# Patient Record
Sex: FEMALE | Race: WHITE | Employment: OTHER | URBAN - METROPOLITAN AREA
[De-identification: names, ages, dates, MRNs, and addresses within clinical notes are randomized per-mention and may not be internally consistent; named-entity substitution may affect disease eponyms.]

---

## 2017-07-17 ENCOUNTER — OFFICE VISIT (OUTPATIENT)
Dept: INTERNAL MEDICINE CLINIC | Age: 82
End: 2017-07-17

## 2017-07-17 VITALS
BODY MASS INDEX: 29.45 KG/M2 | SYSTOLIC BLOOD PRESSURE: 128 MMHG | DIASTOLIC BLOOD PRESSURE: 60 MMHG | HEART RATE: 68 BPM | HEIGHT: 61 IN | WEIGHT: 156 LBS | TEMPERATURE: 97.8 F | OXYGEN SATURATION: 97 %

## 2017-07-17 DIAGNOSIS — J01.90 ACUTE NON-RECURRENT SINUSITIS, UNSPECIFIED LOCATION: Primary | ICD-10-CM

## 2017-07-17 RX ORDER — VALSARTAN AND HYDROCHLOROTHIAZIDE 160; 12.5 MG/1; MG/1
TABLET, FILM COATED ORAL
Refills: 2 | COMMUNITY
Start: 2017-04-26 | End: 2017-12-18 | Stop reason: SDUPTHER

## 2017-07-17 RX ORDER — PRAVASTATIN SODIUM 40 MG/1
TABLET ORAL
Refills: 11 | COMMUNITY
Start: 2017-06-22 | End: 2020-04-15 | Stop reason: SDUPTHER

## 2017-07-17 RX ORDER — CLOPIDOGREL BISULFATE 75 MG/1
TABLET ORAL
Refills: 11 | COMMUNITY
Start: 2017-06-22 | End: 2020-08-17 | Stop reason: ALTCHOICE

## 2017-07-17 RX ORDER — AZITHROMYCIN 250 MG/1
250 TABLET, FILM COATED ORAL SEE ADMIN INSTRUCTIONS
Qty: 6 TAB | Refills: 0 | Status: SHIPPED | OUTPATIENT
Start: 2017-07-17 | End: 2017-07-22

## 2017-07-17 NOTE — MR AVS SNAPSHOT
Visit Information Date & Time Provider Department Dept. Phone Encounter #  
 7/17/2017  3:00 PM Marine Ahumada, NP [address] [place] [address] [facility] [phone] [id number] Your Appointments 1/15/2018  9:30 AM  
[facility] ([facility]) Appt Note: [address] [phone] [address] [address] Upcoming Health Maintenance Date Due DTaP/Tdap/Td series (1 - Tdap) 5/17/1953 ZOSTER VACCINE AGE 60> 5/17/1992 GLAUCOMA SCREENING Q2Y 5/17/1997 OSTEOPOROSIS SCREENING (DEXA) 5/17/1997 MEDICARE YEARLY EXAM 5/17/1997 Pneumococcal 65+ Low/Medium Risk (2 of 2 - PPSV23) 1/1/2015 INFLUENZA AGE 9 TO ADULT 8/1/2017 Allergies as of 7/17/2017  Review Complete On: 7/17/2017 By: Marine Ahumada, NP No Known Allergies Current Immunizations  Reviewed on 9/26/2012 Name Date Influenza Vaccine Split 10/14/2010 11:55 AM  
 Influenza Vaccine Whole 9/10/2012 Pneumococcal Vaccine (Unspecified Type) 1/1/2010 Not reviewed this visit You Were Diagnosed With   
  
 Codes Comments Acute non-recurrent sinusitis, unspecified location    -  Primary ICD-10-CM: J01.90 ICD-9-CM: 461.9 Vitals BP Pulse Temp Height(growth percentile) Weight(growth percentile) SpO2  
 128/60 (BP 1 Location: Left arm, BP Patient Position: Sitting) 68 97.8 °F (36.6 °C) (Oral) 5' 1\" (1.549 m) 156 lb (70.8 kg) 97% BMI OB Status Smoking Status 29.48 kg/m2 Postmenopausal Never Smoker Vitals History BMI and BSA Data Body Mass Index Body Surface Area  
 29.48 kg/m 2 1.75 m 2 Preferred Pharmacy Pharmacy Name Phone [phone] [facility] [phone] Your Updated Medication List  
  
 This list is accurate as of: 7/17/17  4:40 PM.  Always use your most recent med list.  
  
  
  
  
 aspirin 81 mg chewable tablet Take 81 mg by mouth daily. Indications: THROMBOSIS PREVENTION AFTER PCI  
  
 azithromycin 250 mg tablet Commonly known as:  Bibi Croak Take 1 Tab by mouth See Admin Instructions for 5 days. calcium carbonate 600 mg calcium (1,500 mg) tablet Commonly known as:  [place] Take 300 mg by mouth every Monday, Wednesday, Friday. CENTRUM SILVER Tab tablet Generic drug:  multivitamins-minerals-lutein Take 1 Tab by mouth daily. clopidogrel 75 mg Tab Commonly known as:  PLAVIX TAKE 1 TABLET BY MOUTH EVERY DAY  
  
 pravastatin 40 mg tablet Commonly known as:  PRAVACHOL  
TAKE 1 TABLET BY MOUTH AT BEDTIME  
  
 valsartan-hydroCHLOROthiazide 160-12.5 mg per tablet Commonly known as:  DIOVAN-HCT  
TAKE 1 TABLET BY MOUTH EVERY DAY Prescriptions Sent to Pharmacy Refills  
 azithromycin (ZITHROMAX) 250 mg tablet 0 Sig: Take 1 Tab by mouth See Admin Instructions for 5 days. Class: Normal  
 Pharmacy: [address] #: [phone] Route: Oral  
  
Patient Instructions Sinusitis: Care Instructions Your Care Instructions Sinusitis is an infection of the lining of the sinus cavities in your head. Sinusitis often follows a cold. It causes pain and pressure in your head and face. In most cases, sinusitis gets better on its own in 1 to 2 weeks. But some mild symptoms may last for several weeks. Sometimes antibiotics are needed. Follow-up care is a key part of your treatment and safety. Be sure to make and go to all appointments, and call your doctor if you are having problems. It's also a good idea to know your test results and keep a list of the medicines you take. How can you care for yourself at home? · Take an over-the-counter pain medicine, such as acetaminophen (Tylenol), ibuprofen (Advil, Motrin), or naproxen (Aleve). Read and follow all instructions on the label. · If the doctor prescribed antibiotics, take them as directed. Do not stop taking them just because you feel better. You need to take the full course of antibiotics. · Be careful when taking over-the-counter cold or flu medicines and Tylenol at the same time. Many of these medicines have acetaminophen, which is Tylenol. Read the labels to make sure that you are not taking more than the recommended dose. Too much acetaminophen (Tylenol) can be harmful. · Breathe warm, moist air from a steamy shower, a hot bath, or a sink filled with hot water. Avoid cold, dry air. Using a humidifier in your home may help. Follow the directions for cleaning the machine. · Use saline (saltwater) nasal washes to help keep your nasal passages open and wash out mucus and bacteria. You can buy saline nose drops at a grocery store or drugstore. Or you can make your own at home by adding 1 teaspoon of salt and 1 teaspoon of baking soda to 2 cups of distilled water. If you make your own, fill a bulb syringe with the solution, insert the tip into your nostril, and squeeze gently. [place] your nose. · Put a hot, wet towel or a warm gel pack on your face 3 or 4 times a day for 5 to 10 minutes each time. · Try a decongestant nasal spray like oxymetazoline (Afrin). Do not use it for more than 3 days in a row. Using it for more than 3 days can make your congestion worse. When should you call for help? Call your doctor now or seek immediate medical care if: 
· You have new or worse swelling or redness in your face or around your eyes. · You have a new or higher fever. Watch closely for changes in your health, and be sure to contact your doctor if: 
· You have new or worse facial pain. · The mucus from your nose becomes thicker (like pus) or has new blood in it. · You are not getting better as expected. Where can you learn more? Go to http://richard-erum.info/. Enter T704 in the search box to learn more about \"Sinusitis: Care Instructions. \" Current as of: July 29, 2016 Content Version: 11.3 © 4680-0797 [employer]. Care instructions adapted under license by [employer] (which disclaims liability or warranty for this information). If you have questions about a medical condition or this instruction, always ask your healthcare professional. [address] any warranty or liability for your use of this information. Introducing [facility] & HEALTH SERVICES! [facility] introduces [employer] patient portal. Now you can access parts of your medical record, email your doctor's office, and request medication refills online. 1. In your internet browser, go to [email or web address] 2. Click on the First Time User? Click Here link in the Sign In box. You will see the New Member Sign Up page. 3. Enter your [employer] Access Code exactly as it appears below. You will not need to use this code after youve completed the sign-up process. If you do not sign up before the expiration date, you must request a new code. · [employer] Access Code: [email or web address] Expires: 10/15/2017  2:54 PM 
 
4. Enter the last four digits of your Social Security Number (xxxx) and Date of Birth (mm/dd/yyyy) as indicated and click Submit. You will be taken to the next sign-up page. 5. Create a Human Performance Integrated Systemst ID. This will be your [employer] login ID and cannot be changed, so think of one that is secure and easy to remember. 6. Create a [employer] password. You can change your password at any time. 7. Enter your Password Reset Question and Answer. This can be used at a later time if you forget your password. 8. Enter your e-mail address. You will receive e-mail notification when new information is available in [address] 9. Click Sign Up. You can now view and download portions of your medical record. 10. Click the Download Summary menu link to download a portable copy of your medical information. If you have questions, please visit the Frequently Asked Questions section of the [employer] website. Remember, [employer] is NOT to be used for urgent needs. For medical emergencies, dial 911. Now available from your iPhone and Android! Please provide this summary of care documentation to your next provider. Your primary care clinician is listed as MILADIS Short. If you have any questions after today's visit, please call [phone].

## 2017-07-17 NOTE — PROGRESS NOTES
Patient of Dr Elizabeth Adler here for cough that started last Friday. Cough productive of clear sputum  Patient has been unable to sleep. 1. Have you been to the ER, urgent care clinic since your last visit? Hospitalized since your last visit? No    2. Have you seen or consulted any other health care providers outside of the [address] [place] [address] since your last visit? Include any pap smears or colon screening.  No

## 2017-07-17 NOTE — PROGRESS NOTES
Subjective:  Ms. Renetta Higuera is a pleasant 80year old lady who comes in today for a four day history of some nasal congestion, yellowish nasal drainage and postnasal discharge. She denies any earache or sore throat. She denies any shortness of breath, cough, wheezing or hemoptysis. She denies any chills or fever. She denies any nausea or vomiting. She has been using some Tylenol that has helped minimally. Physical Examination:  GENERAL:  On exam she is a pleasant, elderly lady in no acute distress. She is alert and oriented. HEENT:  Normocephalic, atraumatic. TMs normal.  Mouth mucosa pink. Tongue midline. Pharynx normal.  NECK:  Supple without adenopathy. CHEST:  Lungs were clear to auscultation, no rales or wheezes. CARDIAC:  Heart regular rhythm without murmur. EXTREMITIES:  No edema or calf tenderness. Distal pulses were present. Impression:  1. Acute sinusitis. Plan:  1. It was opted to start her on a Z-Jesus.   2. I have asked her to get some Zyrtec or Claritin from over the counter and start taking it while on the antibiotic. 3. She is to increase fluid and rest.  4. She will call us should she fail to improve or if her condition worsens.

## 2017-07-17 NOTE — PATIENT INSTRUCTIONS
Sinusitis: Care Instructions  Your Care Instructions    Sinusitis is an infection of the lining of the sinus cavities in your head. Sinusitis often follows a cold. It causes pain and pressure in your head and face. In most cases, sinusitis gets better on its own in 1 to 2 weeks. But some mild symptoms may last for several weeks. Sometimes antibiotics are needed. Follow-up care is a key part of your treatment and safety. Be sure to make and go to all appointments, and call your doctor if you are having problems. It's also a good idea to know your test results and keep a list of the medicines you take. How can you care for yourself at home? · Take an over-the-counter pain medicine, such as acetaminophen (Tylenol), ibuprofen (Advil, Motrin), or naproxen (Aleve). Read and follow all instructions on the label. · If the doctor prescribed antibiotics, take them as directed. Do not stop taking them just because you feel better. You need to take the full course of antibiotics. · Be careful when taking over-the-counter cold or flu medicines and Tylenol at the same time. Many of these medicines have acetaminophen, which is Tylenol. Read the labels to make sure that you are not taking more than the recommended dose. Too much acetaminophen (Tylenol) can be harmful. · Breathe warm, moist air from a steamy shower, a hot bath, or a sink filled with hot water. Avoid cold, dry air. Using a humidifier in your home may help. Follow the directions for cleaning the machine. · Use saline (saltwater) nasal washes to help keep your nasal passages open and wash out mucus and bacteria. You can buy saline nose drops at a grocery store or drugstore. Or you can make your own at home by adding 1 teaspoon of salt and 1 teaspoon of baking soda to 2 cups of distilled water. If you make your own, fill a bulb syringe with the solution, insert the tip into your nostril, and squeeze gently. [place] Parisian your nose.   · Put a hot, wet towel or a warm gel pack on your face 3 or 4 times a day for 5 to 10 minutes each time. · Try a decongestant nasal spray like oxymetazoline (Afrin). Do not use it for more than 3 days in a row. Using it for more than 3 days can make your congestion worse. When should you call for help? Call your doctor now or seek immediate medical care if:  · You have new or worse swelling or redness in your face or around your eyes. · You have a new or higher fever. Watch closely for changes in your health, and be sure to contact your doctor if:  · You have new or worse facial pain. · The mucus from your nose becomes thicker (like pus) or has new blood in it. · You are not getting better as expected. Where can you learn more? Go to http://richard-erum.info/. Enter Y046 in the search box to learn more about \"Sinusitis: Care Instructions. \"  Current as of: July 29, 2016  Content Version: 11.3  © 6466-7481 [employer]. Care instructions adapted under license by [employer] (which disclaims liability or warranty for this information). If you have questions about a medical condition or this instruction, always ask your healthcare professional. [place] [address] any warranty or liability for your use of this information.

## 2017-07-27 ENCOUNTER — OFFICE VISIT (OUTPATIENT)
Dept: INTERNAL MEDICINE CLINIC | Age: 82
End: 2017-07-27

## 2017-07-27 VITALS
HEIGHT: 62 IN | DIASTOLIC BLOOD PRESSURE: 62 MMHG | WEIGHT: 156 LBS | SYSTOLIC BLOOD PRESSURE: 148 MMHG | BODY MASS INDEX: 28.71 KG/M2

## 2017-07-27 DIAGNOSIS — M17.11 ARTHRITIS OF RIGHT KNEE: Primary | ICD-10-CM

## 2017-07-27 DIAGNOSIS — M17.11 PRIMARY OSTEOARTHRITIS OF RIGHT KNEE: ICD-10-CM

## 2017-07-27 PROBLEM — I10 HTN (HYPERTENSION): Status: ACTIVE | Noted: 2017-07-27

## 2017-07-27 PROBLEM — M54.9 BACK PAIN: Status: ACTIVE | Noted: 2017-07-27

## 2017-07-27 PROBLEM — R53.83 FATIGUE: Status: ACTIVE | Noted: 2017-07-27

## 2017-07-27 PROBLEM — R05.9 COUGH: Status: ACTIVE | Noted: 2017-07-27

## 2017-07-27 PROBLEM — I65.29 CAROTID ATHEROSCLEROSIS: Status: ACTIVE | Noted: 2017-07-27

## 2017-07-27 PROBLEM — Z23 NEED FOR STREPTOCOCCUS PNEUMONIAE VACCINATION: Status: ACTIVE | Noted: 2017-07-27

## 2017-07-27 PROBLEM — Z79.899 ON STATIN THERAPY: Status: ACTIVE | Noted: 2017-07-27

## 2017-07-27 PROBLEM — L57.0 AK (ACTINIC KERATOSIS): Status: ACTIVE | Noted: 2017-07-27

## 2017-07-27 PROBLEM — Z87.01 HISTORY OF PNEUMONIA: Status: ACTIVE | Noted: 2017-07-27

## 2017-07-27 PROBLEM — I25.10 ARTERIOSCLEROTIC CORONARY ARTERY DISEASE: Status: ACTIVE | Noted: 2017-07-27

## 2017-07-27 PROBLEM — E78.5 HYPERLIPIDEMIA: Status: ACTIVE | Noted: 2017-07-27

## 2017-07-27 PROBLEM — R55 SYNCOPE: Status: ACTIVE | Noted: 2017-07-27

## 2017-07-27 PROBLEM — M72.2 PLANTAR FASCIITIS: Status: ACTIVE | Noted: 2017-07-27

## 2017-07-27 PROBLEM — S43.409A SHOULDER SPRAIN: Status: ACTIVE | Noted: 2017-07-27

## 2017-07-27 RX ORDER — CEFDINIR 300 MG/1
300 CAPSULE ORAL 2 TIMES DAILY
COMMUNITY
End: 2017-07-27 | Stop reason: ALTCHOICE

## 2017-07-27 NOTE — MR AVS SNAPSHOT
Visit Information Date & Time Provider Department Dept. Phone Encounter #  
 7/27/2017  9:10 AM MILADIS Barnhart MD [address] [place] [address] [facility] [phone] [id number] Follow-up Instructions Return if symptoms worsen or fail to improve. Your Appointments 1/15/2018  9:30 AM  
[facility] Appt Note: [address] [phone] [address] [facility] [address] Upcoming Health Maintenance Date Due DTaP/Tdap/Td series (1 - Tdap) 5/17/1953 ZOSTER VACCINE AGE 60> 3/17/1992 GLAUCOMA SCREENING Q2Y 5/17/1997 OSTEOPOROSIS SCREENING (DEXA) 5/17/1997 MEDICARE YEARLY EXAM 5/17/1997 Pneumococcal 65+ Low/Medium Risk (2 of 2 - PPSV23) 1/1/2015 INFLUENZA AGE 9 TO ADULT 8/1/2017 Allergies as of 7/27/2017  Review Complete On: 7/27/2017 By: Stuart Billy MD  
 No Known Allergies Current Immunizations  Reviewed on 9/26/2012 Name Date Influenza Vaccine 12/1/2010 Influenza Vaccine Split 10/14/2010 11:55 AM  
 Influenza Vaccine Whole 9/10/2012 ZZZ-RETIRED (DO NOT USE) Pneumococcal Vaccine (Unspecified Type) 1/1/2010 Not reviewed this visit You Were Diagnosed With   
  
 Codes Comments Arthritis of right knee    -  Primary ICD-10-CM: M17.11 ICD-9-CM: 716.96 Primary osteoarthritis of right knee     ICD-10-CM: M17.11 ICD-9-CM: 715.16 Vitals BP Height(growth percentile) Weight(growth percentile) BMI OB Status Smoking Status 148/62 (BP 1 Location: Left arm, BP Patient Position: Sitting) 5' 2\" (1.575 m) 156 lb (70.8 kg) 28.53 kg/m2 Postmenopausal Never Smoker BMI and BSA Data Body Mass Index Body Surface Area 28.53 kg/m 2 1.76 m 2 Preferred Pharmacy Pharmacy Name Phone [phone] [facility] [phone] Your Updated Medication List  
  
   
This list is accurate as of: 7/27/17 10:55 AM.  Always use your most recent med list.  
  
  
  
  
 CENTRUM SILVER Tab tablet Generic drug:  multivitamins-minerals-lutein Take 1 Tab by mouth daily. clopidogrel 75 mg Tab Commonly known as:  PLAVIX TAKE 1 TABLET BY MOUTH EVERY DAY  
  
 pravastatin 40 mg tablet Commonly known as:  PRAVACHOL  
TAKE 1 TABLET BY MOUTH AT BEDTIME  
  
 valsartan-hydroCHLOROthiazide 160-12.5 mg per tablet Commonly known as:  DIOVAN-HCT  
TAKE 1 TABLET BY MOUTH EVERY DAY Follow-up Instructions Return if symptoms worsen or fail to improve. Patient Instructions Knee Arthritis: Care Instructions Your Care Instructions Knee arthritis is a breakdown of the cartilage that cushions your knee joint. When the cartilage wears down, your bones rub against each other. This causes pain and stiffness. Knee arthritis tends to get worse with time. Treatment for knee arthritis involves reducing pain, making the leg muscles stronger, and staying at a healthy body weight. The treatment usually does not improve the health of the cartilage, but it can reduce pain and improve how well your knee works. You can take simple measures to protect your knee joints, ease your pain, and help you stay active. Follow-up care is a key part of your treatment and safety. Be sure to make and go to all appointments, and call your doctor if you are having problems. It's also a good idea to know your test results and keep a list of the medicines you take. How can you care for yourself at home? · Know that knee arthritis will cause more pain on some days than on others. · Stay at a healthy weight. Lose weight if you are overweight.  When you stand up, the pressure on your knees from every pound of body weight is multiplied four times. So if you lose 10 pounds, you will reduce the pressure on your knees by 40 pounds. · Talk to your doctor or physical therapist about exercises that will help ease joint pain. ¨ Stretch to help prevent stiffness and to prevent injury before you exercise. You may enjoy gentle forms of yoga to help keep your knee joints and muscles flexible. ¨ Walk instead of jog. ¨ Ride a bike. This makes your thigh muscles stronger and takes pressure off your knee. ¨ Wear well-fitting and comfortable shoes. ¨ Exercise in chest-deep water. This can help you exercise longer with less pain. ¨ Avoid exercises that include squatting or kneeling. They can put a lot of strain on your knees. ¨ Talk to your doctor to make sure that the exercise you do is not making the arthritis worse. · Do not sit for long periods of time. Try to walk once in a while to keep your knee from getting stiff. · Ask your doctor or physical therapist whether shoe inserts may reduce your arthritis pain. · If you can afford it, get new athletic shoes at least every year. This can help reduce the strain on your knees. · Use a device to help you do everyday activities. ¨ A cane or walking stick can help you keep your balance when you walk. Hold the cane or walking stick in the hand opposite the painful knee. ¨ If you feel like you may fall when you walk, try using crutches or a front-wheeled walker. These can prevent falls that could cause more damage to your knee. ¨ A knee brace may help keep your knee stable and prevent pain. ¨ You also can use other things to make life easier, such as a higher toilet seat and handrails in the bathtub or shower. · Take pain medicines exactly as directed. ¨ Do not wait until you are in severe pain. You will get better results if you take it sooner.  
¨ If you are not taking a prescription pain medicine, take an over-the-counter medicine such as acetaminophen (Tylenol), ibuprofen (Advil, Motrin), or naproxen (Aleve). Read and follow all instructions on the label. ¨ Do not take two or more pain medicines at the same time unless the doctor told you to. Many pain medicines have acetaminophen, which is Tylenol. Too much acetaminophen (Tylenol) can be harmful. ¨ Tell your doctor if you take a blood thinner, have diabetes, or have allergies to shellfish. · Ask your doctor if you might benefit from a shot of steroid medicine into your knee. This may provide pain relief for several months. · Many people take the supplements glucosamine and chondroitin for osteoarthritis. Some people feel they help, but the medical research does not show that they work. Talk to your doctor before you take these supplements. When should you call for help? Call your doctor now or seek immediate medical care if: 
· You have sudden swelling, warmth, or pain in your knee. · You have knee pain and a fever or rash. · You have such bad pain that you cannot use your knee. Watch closely for changes in your health, and be sure to contact your doctor if you have any problems. Where can you learn more? Go to http://richard-erum.info/. Enter Y033 in the search box to learn more about \"Knee Arthritis: Care Instructions. \" Current as of: October 31, 2016 Content Version: 11.3 © 4792-9672 [employer]. Care instructions adapted under license by [employer] (which disclaims liability or warranty for this information). If you have questions about a medical condition or this instruction, always ask your healthcare professional. [place] [address] any warranty or liability for your use of this information. Introducing [facility] & HEALTH SERVICES! [facility] introduces [employer] patient portal. Now you can access parts of your medical record, email your doctor's office, and request medication refills online.    
 
1. In your internet browser, go to [email or web address] 2. Click on the First Time User? Click Here link in the Sign In box. You will see the New Member Sign Up page. 3. Enter your [employer] Access Code exactly as it appears below. You will not need to use this code after youve completed the sign-up process. If you do not sign up before the expiration date, you must request a new code. · [employer] Access Code: [email or web address] Expires: 10/15/2017  2:54 PM 
 
4. Enter the last four digits of your Social Security Number (xxxx) and Date of Birth (mm/dd/yyyy) as indicated and click Submit. You will be taken to the next sign-up page. 5. Create a WebPTt ID. This will be your [employer] login ID and cannot be changed, so think of one that is secure and easy to remember. 6. Create a [employer] password. You can change your password at any time. 7. Enter your Password Reset Question and Answer. This can be used at a later time if you forget your password. 8. Enter your e-mail address. You will receive e-mail notification when new information is available in [address] 9. Click Sign Up. You can now view and download portions of your medical record. 10. Click the Download Summary menu link to download a portable copy of your medical information. If you have questions, please visit the Frequently Asked Questions section of the [employer] website. Remember, [employer] is NOT to be used for urgent needs. For medical emergencies, dial 911. Now available from your iPhone and Android! Please provide this summary of care documentation to your next provider. Your primary care clinician is listed as MILADIS Magdaleno. If you have any questions after today's visit, please call [phone].

## 2017-07-27 NOTE — PROGRESS NOTES
Kadeem Cornelius is a 80 y.o. female presenting for Knee Pain (rm 3 -  Right knee pain)  . 1. Have you been to the ER, urgent care clinic since your last visit? Hospitalized since your last visit? No    2. Have you seen or consulted any other health care providers outside of the [address] [place] [address] since your last visit? Include any pap smears or colon screening.  No

## 2017-07-27 NOTE — PATIENT INSTRUCTIONS

## 2017-07-27 NOTE — PROGRESS NOTES
Subjective:   Mrs. Addis Bush presents today with complaint of right knee pain. She denies any trauma or injury. Past Medical History:   Diagnosis Date    AK (actinic keratosis) 7/27/2017    Arteriosclerotic coronary artery disease 7/27/2017    Arthritis of right knee 7/27/2017    Back pain 7/27/2017    Carotid atherosclerosis 7/27/2017    Cough 7/27/2017    Fatigue 7/27/2017    History of pneumonia 7/27/2017    HTN (hypertension) 7/27/2017    Hyperlipidemia 7/27/2017    Hypertension     Need for Streptococcus pneumoniae vaccination 7/27/2017    On statin therapy 7/27/2017    Plantar fasciitis 7/27/2017    Pneumonia     Shoulder sprain 7/27/2017    Syncope 7/27/2017     Past Surgical History:   Procedure Laterality Date    HX GI      ruptured intestine      Social History   Substance Use Topics    Smoking status: Never Smoker    Smokeless tobacco: Never Used    Alcohol use No     family history includes Heart Disease in her father; Hypertension in her mother. Current Outpatient Prescriptions on File Prior to Visit   Medication Sig    pravastatin (PRAVACHOL) 40 mg tablet TAKE 1 TABLET BY MOUTH AT BEDTIME    valsartan-hydroCHLOROthiazide (DIOVAN-HCT) 160-12.5 mg per tablet TAKE 1 TABLET BY MOUTH EVERY DAY    clopidogrel (PLAVIX) 75 mg tab TAKE 1 TABLET BY MOUTH EVERY DAY    multivitamins-minerals-lutein (CENTRUM SILVER) Tab Take 1 Tab by mouth daily. No current facility-administered medications on file prior to visit.       No Known Allergies    Review of Systems:  GEN: no weight loss, weight gain, fatigue or night sweats  CV: no PND, orthopnea, or palpitations  Resp: no dyspnea on exertion, no cough  Abd: no nausea, vomiting or diarrhea  Endocrine: no hair loss, excessive thirst or polyuria  Neurological ROS: no TIA or stroke symptoms      Objective:     Visit Vitals    /62 (BP 1 Location: Left arm, BP Patient Position: Sitting)    Ht 5' 2\" (1.575 m)    Wt 156 lb (70.8 kg)    BMI 28.53 kg/m2     General:   alert, cooperative and no distress   Eyes: conjunctivae/sclerae clear. PERRL, EOM's intact   Mouth:  No oral lesions, no pharyngeal erythema, no exudates   Neck: Trachea midline, no thyromegaly, no bruits   Heart: S1 and S2 normal,no murmurs noted    Lungs: Clear to auscultation bilaterally, no increased work of breathing   Abdomen: Soft, nontender. Normal bowel sounds   Extremities: No edema or cyanosis       Neuro: ..alert, oriented x3,speech normal in context and clarity, cranial nerves II-XII intact,motor strength: full proximally and distally,gait: normal  reflexes: full and symmetric       Lab review: no lab studies available for review at time of visit. Assessment/Plan:       BELLA Soriano MD  Indications:   Symptom relief from osteoarthritis    Procedure:  After consent was obtained, using sterile technique the right knee joint was prepped using Betadine. Depo-Medrol 1 mg was mixed with 1% lidocaine 2 ml  and injected into the joint and the needle withdrawn. The procedure was well tolerated. The patient is asked to continue to rest the joint for a few more days before resuming regular activities. It may be more painful for the first 1-2 days. Watch for fever, or increased swelling or persistent pain in the joint. Call or return to clinic prn if such symptoms occur or there is failure to improve as anticipated.

## 2017-10-23 ENCOUNTER — OFFICE VISIT (OUTPATIENT)
Dept: INTERNAL MEDICINE CLINIC | Age: 82
End: 2017-10-23

## 2017-10-23 VITALS
HEIGHT: 62 IN | HEART RATE: 65 BPM | DIASTOLIC BLOOD PRESSURE: 70 MMHG | OXYGEN SATURATION: 97 % | BODY MASS INDEX: 29.44 KG/M2 | WEIGHT: 160 LBS | SYSTOLIC BLOOD PRESSURE: 150 MMHG

## 2017-10-23 DIAGNOSIS — M25.512 LEFT SHOULDER PAIN, UNSPECIFIED CHRONICITY: Primary | ICD-10-CM

## 2017-10-23 NOTE — PROGRESS NOTES
Subjective:  Ms. Joy Salazar is a pleasant 80year old lady patient of Dr. Scott Barnard, who comes in today complaining of pain in the top of her left shoulder where her bra strap is hitting over the bone. She has not noticed any open lesions. She denies any injury to this left shoulder. She denies any falls. She has no pain as long as this area is not being touched. She has not noticed any rash. Physical Examination:  GENERAL:  On exam she is a pleasant, elderly lady in no acute distress. She is alert and oriented. She has a normal gait. VITALS:  BP: 150/70. P: 65.  O2 sat: 975. NECK:  Supple without adenopathy. CHEST:  Lungs were clear to auscultation, no rales or wheezes. CARDIAC:  Heart regular rhythm without murmur. EXTREMITIES:  No edema or calf tenderness. Distal pulses were present. MUSCULOSKELETAL:  Left shoulder - she does have full ROM of this left shoulder without any pain. She is thin so she has a bony prominence. Top of her shoulder - there are no palpable masses, there is no bruising or open lesion. She had excellent strength in the upper extremities against resistance. Studies:  Two views of the left shoulder reveals mild changes at her [facility] joint. Impression:  1. Left shoulder pain. Plan:  1. I do not believe it is from her [facility] joint. I did ask her to use a small pad to put underneath her bra strap. 2. She will follow up with Dr. Scott Barnard should she have any remaining concerns.

## 2017-10-23 NOTE — MR AVS SNAPSHOT
Visit Information Date & Time Provider Department Dept. Phone Encounter #  
 10/23/2017  3:15 PM Rehana Duron NP [facility] [id number] Follow-up Instructions Return if symptoms worsen or fail to improve. Your Appointments 1/15/2018  9:30 AM  
Follow Up with MD CAROL Gallegos [facility] ([facility]) Appt Note: [address] [id number] [address] [facility] [address] Upcoming Health Maintenance Date Due DTaP/Tdap/Td series (1 - Tdap) 5/17/1953 ZOSTER VACCINE AGE 60> 3/17/1992 GLAUCOMA SCREENING Q2Y 5/17/1997 OSTEOPOROSIS SCREENING (DEXA) 5/17/1997 MEDICARE YEARLY EXAM 5/17/1997 Pneumococcal 65+ Low/Medium Risk (2 of 2 - PPSV23) 1/1/2015 INFLUENZA AGE 9 TO ADULT 8/1/2017 Allergies as of 10/23/2017  Review Complete On: 10/23/2017 By: Onel Vazquez No Known Allergies Current Immunizations  Reviewed on 9/26/2012 Name Date Influenza Vaccine 12/1/2010 Influenza Vaccine Split 10/14/2010 11:55 AM  
 Influenza Vaccine Whole 9/10/2012 ZZZ-RETIRED (DO NOT USE) Pneumococcal Vaccine (Unspecified Type) 1/1/2010 Not reviewed this visit You Were Diagnosed With   
  
 Codes Comments Left shoulder pain, unspecified chronicity    -  Primary ICD-10-CM: M25.512 ICD-9-CM: 719.41 Vitals BP Pulse Height(growth percentile) Weight(growth percentile) SpO2 BMI  
 166/72 (BP 1 Location: Left arm, BP Patient Position: Sitting) 65 5' 2\" (1.575 m) 160 lb (72.6 kg) 97% 29.26 kg/m2 OB Status Smoking Status Postmenopausal Never Smoker Vitals History BMI and BSA Data Body Mass Index Body Surface Area  
 29.26 kg/m 2 1.78 m 2 Preferred Pharmacy Pharmacy Name Phone [id number] [facility] [phone] Your Updated Medication List  
  
   
This list is accurate as of: 10/23/17  4:15 PM.  Always use your most recent med list.  
  
  
  
  
 CENTRUM SILVER Tab tablet Generic drug:  multivitamins-minerals-lutein Take 1 Tab by mouth daily. clopidogrel 75 mg Tab Commonly known as:  PLAVIX TAKE 1 TABLET BY MOUTH EVERY DAY  
  
 pravastatin 40 mg tablet Commonly known as:  PRAVACHOL  
TAKE 1 TABLET BY MOUTH AT BEDTIME  
  
 valsartan-hydroCHLOROthiazide 160-12.5 mg per tablet Commonly known as:  DIOVAN-HCT  
TAKE 1 TABLET BY MOUTH EVERY DAY Follow-up Instructions Return if symptoms worsen or fail to improve. To-Do List   
 10/23/2017 Imaging:  XR SHOULDER LT AP/LAT MIN 2 V Patient Instructions Shoulder Pain: Care Instructions Your Care Instructions You can hurt your shoulder by using it too much during an activity, such as fishing or baseball. It can also happen as part of the everyday wear and tear of getting older. Shoulder injuries can be slow to heal, but your shoulder should get better with time. Your doctor may recommend a sling to rest your shoulder. If you have injured your shoulder, you may need testing and treatment. Follow-up care is a key part of your treatment and safety. Be sure to make and go to all appointments, and call your doctor if you are having problems. It's also a good idea to know your test results and keep a list of the medicines you take. How can you care for yourself at home? · Take pain medicines exactly as directed. ¨ If the doctor gave you a prescription medicine for pain, take it as prescribed. ¨ If you are not taking a prescription pain medicine, ask your doctor if you can take an over-the-counter medicine.  
¨ Do not take two or more pain medicines at the same time unless the doctor told you to. Many pain medicines contain acetaminophen, which is Tylenol. Too much acetaminophen (Tylenol) can be harmful. · If your doctor recommends that you wear a sling, use it as directed. Do not take it off before your doctor tells you to. · Put ice or a cold pack on the sore area for 10 to 20 minutes at a time. Put a thin cloth between the ice and your skin. · If there is no swelling, you can put moist heat, a heating pad, or a warm cloth on your shoulder. Some doctors suggest alternating between hot and cold. · Rest your shoulder for a few days. If your doctor recommends it, you can then begin gentle exercise of the shoulder, but do not lift anything heavy. When should you call for help? Call 911 anytime you think you may need emergency care. For example, call if: 
· You have chest pain or pressure. This may occur with: ¨ Sweating. ¨ Shortness of breath. ¨ Nausea or vomiting. ¨ Pain that spreads from the chest to the neck, jaw, or one or both shoulders or arms. ¨ Dizziness or lightheadedness. ¨ A fast or uneven pulse. After calling 911, chew 1 adult-strength aspirin. Wait for an ambulance. Do not try to drive yourself. · Your arm or hand is cool or pale or changes color. Call your doctor now or seek immediate medical care if: 
· You have signs of infection, such as: 
¨ Increased pain, swelling, warmth, or redness in your shoulder. ¨ Red streaks leading from a place on your shoulder. ¨ Pus draining from an area of your shoulder. ¨ Swollen lymph nodes in your neck, armpits, or groin. ¨ A fever. Watch closely for changes in your health, and be sure to contact your doctor if: 
· You cannot use your shoulder. · Your shoulder does not get better as expected. Where can you learn more? Go to http://richard-erum.info/. Enter G637 in the search box to learn more about \"Shoulder Pain: Care Instructions. \" Current as of: March 21, 2017 Content Version: 11.3 © 2473-7903 [employer]. Care instructions adapted under license by [employer] (which disclaims liability or warranty for this information). If you have questions about a medical condition or this instruction, always ask your healthcare professional. [address] any warranty or liability for your use of this information. Introducing [facility]! [facility] introduces [employer] patient portal. Now you can access parts of your medical record, email your doctor's office, and request medication refills online. 1. In your internet browser, go to [email or web address] "[email or web address]/[email or web address] 2. Click on the First Time User? Click Here link in the Sign In box. You will see the New Member Sign Up page. 3. Enter your [employer] Access Code exactly as it appears below. You will not need to use this code after youve completed the sign-up process. If you do not sign up before the expiration date, you must request a new code. · [employer] Access Code: [email or web address] Expires: 1/21/2018  3:54 PM 
 
4. Enter the last four digits of your Social Security Number (xxxx) and Date of Birth (mm/dd/yyyy) as indicated and click Submit. You will be taken to the next sign-up page. 5. Create a [employer] ID. This will be your [employer] login ID and cannot be changed, so think of one that is secure and easy to remember. 6. Create a [employer] password. You can change your password at any time. 7. Enter your Password Reset Question and Answer. This can be used at a later time if you forget your password. 8. Enter your e-mail address. You will receive e-mail notification when new information is available in [address] 9. Click Sign Up. You can now view and download portions of your medical record. 10. Click the Download Summary menu link to download a portable copy of your medical information.  
 
If you have questions, please visit the Frequently Asked Questions section of the [employer]. Remember, [employer] is NOT to be used for urgent needs. For medical emergencies, dial 911. Now available from your iPhone and Android! Please provide this summary of care documentation to your next provider. Your primary care clinician is listed as MILADIS Patel. If you have any questions after today's visit, please call [phone].

## 2017-10-23 NOTE — PATIENT INSTRUCTIONS
Shoulder Pain: Care Instructions  Your Care Instructions    You can hurt your shoulder by using it too much during an activity, such as fishing or baseball. It can also happen as part of the everyday wear and tear of getting older. Shoulder injuries can be slow to heal, but your shoulder should get better with time. Your doctor may recommend a sling to rest your shoulder. If you have injured your shoulder, you may need testing and treatment. Follow-up care is a key part of your treatment and safety. Be sure to make and go to all appointments, and call your doctor if you are having problems. It's also a good idea to know your test results and keep a list of the medicines you take. How can you care for yourself at home? · Take pain medicines exactly as directed. ¨ If the doctor gave you a prescription medicine for pain, take it as prescribed. ¨ If you are not taking a prescription pain medicine, ask your doctor if you can take an over-the-counter medicine. ¨ Do not take two or more pain medicines at the same time unless the doctor told you to. Many pain medicines contain acetaminophen, which is Tylenol. Too much acetaminophen (Tylenol) can be harmful. · If your doctor recommends that you wear a sling, use it as directed. Do not take it off before your doctor tells you to. · Put ice or a cold pack on the sore area for 10 to 20 minutes at a time. Put a thin cloth between the ice and your skin. · If there is no swelling, you can put moist heat, a heating pad, or a warm cloth on your shoulder. Some doctors suggest alternating between hot and cold. · Rest your shoulder for a few days. If your doctor recommends it, you can then begin gentle exercise of the shoulder, but do not lift anything heavy. When should you call for help? Call 911 anytime you think you may need emergency care. For example, call if:  · You have chest pain or pressure. This may occur with:  ¨ Sweating. ¨ Shortness of breath.   ¨ Nausea or vomiting. ¨ Pain that spreads from the chest to the neck, jaw, or one or both shoulders or arms. ¨ Dizziness or lightheadedness. ¨ A fast or uneven pulse. After calling 911, chew 1 adult-strength aspirin. Wait for an ambulance. Do not try to drive yourself. · Your arm or hand is cool or pale or changes color. Call your doctor now or seek immediate medical care if:  · You have signs of infection, such as:  ¨ Increased pain, swelling, warmth, or redness in your shoulder. ¨ Red streaks leading from a place on your shoulder. ¨ Pus draining from an area of your shoulder. ¨ Swollen lymph nodes in your neck, armpits, or groin. ¨ A fever. Watch closely for changes in your health, and be sure to contact your doctor if:  · You cannot use your shoulder. · Your shoulder does not get better as expected. Where can you learn more? Go to http://richard-erum.info/. Enter V941 in the search box to learn more about \"Shoulder Pain: Care Instructions. \"  Current as of: March 21, 2017  Content Version: 11.3  © 1487-4260 [employer]. Care instructions adapted under license by [employer] (which disclaims liability or warranty for this information). If you have questions about a medical condition or this instruction, always ask your healthcare professional. [place] [address] any warranty or liability for your use of this information.

## 2017-12-18 NOTE — TELEPHONE ENCOUNTER
Requested Prescriptions     Pending Prescriptions Disp Refills    valsartan-hydroCHLOROthiazide (DIOVAN-HCT) 160-12.5 mg per tablet 1 Tab 2     Sig: Take 1 Tab by mouth daily.        Last Refill: 04/26/17  Next Appointment 01/15/18

## 2017-12-19 RX ORDER — VALSARTAN AND HYDROCHLOROTHIAZIDE 160; 12.5 MG/1; MG/1
1 TABLET, FILM COATED ORAL DAILY
Qty: 30 TAB | Refills: 6 | Status: SHIPPED | OUTPATIENT
Start: 2017-12-19 | End: 2018-03-04 | Stop reason: SDUPTHER

## 2018-01-15 ENCOUNTER — OFFICE VISIT (OUTPATIENT)
Dept: INTERNAL MEDICINE CLINIC | Age: 83
End: 2018-01-15

## 2018-01-15 VITALS
DIASTOLIC BLOOD PRESSURE: 78 MMHG | WEIGHT: 149 LBS | TEMPERATURE: 98 F | RESPIRATION RATE: 18 BRPM | SYSTOLIC BLOOD PRESSURE: 130 MMHG | HEART RATE: 66 BPM | OXYGEN SATURATION: 98 % | BODY MASS INDEX: 27.42 KG/M2 | HEIGHT: 62 IN

## 2018-01-15 DIAGNOSIS — J06.9 UPPER RESPIRATORY TRACT INFECTION, UNSPECIFIED TYPE: ICD-10-CM

## 2018-01-15 DIAGNOSIS — I27.20 PULMONARY HYPERTENSION, MODERATE TO SEVERE (HCC): ICD-10-CM

## 2018-01-15 DIAGNOSIS — I25.10 ARTERIOSCLEROTIC CORONARY ARTERY DISEASE: ICD-10-CM

## 2018-01-15 DIAGNOSIS — Z13.1 SCREENING FOR DIABETES MELLITUS: ICD-10-CM

## 2018-01-15 DIAGNOSIS — Z79.899 ON STATIN THERAPY: ICD-10-CM

## 2018-01-15 DIAGNOSIS — E78.00 PURE HYPERCHOLESTEROLEMIA: ICD-10-CM

## 2018-01-15 DIAGNOSIS — Z00.00 MEDICARE ANNUAL WELLNESS VISIT, SUBSEQUENT: Primary | ICD-10-CM

## 2018-01-15 DIAGNOSIS — I10 ESSENTIAL HYPERTENSION: ICD-10-CM

## 2018-01-15 DIAGNOSIS — Z13.6 SCREENING FOR ISCHEMIC HEART DISEASE: ICD-10-CM

## 2018-01-15 RX ORDER — AZITHROMYCIN 250 MG/1
TABLET, FILM COATED ORAL
Qty: 6 TAB | Refills: 0 | Status: SHIPPED | OUTPATIENT
Start: 2018-01-15 | End: 2018-01-20

## 2018-01-15 NOTE — MR AVS SNAPSHOT
Visit Information Date & Time Provider Department Dept. Phone Encounter #  
 1/15/2018  9:30 AM MILADIS Galindo MD [address] [place] [address] [facility] [phone] [id number] Follow-up Instructions Return in about 6 months (around 7/15/2018) for follow up. Upcoming Health Maintenance Date Due DTaP/Tdap/Td series (1 - Tdap) 5/17/1953 ZOSTER VACCINE AGE 60> 3/17/1992 GLAUCOMA SCREENING Q2Y 5/17/1997 OSTEOPOROSIS SCREENING (DEXA) 5/17/1997 MEDICARE YEARLY EXAM 5/17/1997 Pneumococcal 65+ Low/Medium Risk (2 of 2 - PPSV23) 1/1/2015 Allergies as of 1/15/2018  Review Complete On: 1/15/2018 By: Arpita Flores MD  
 No Known Allergies Current Immunizations  Reviewed on 9/26/2012 Name Date Influenza Vaccine 12/1/2010 Influenza Vaccine Split 10/14/2010 11:55 AM  
 Influenza Vaccine Whole 9/10/2012 ZZZ-RETIRED (DO NOT USE) Pneumococcal Vaccine (Unspecified Type) 1/1/2010 Not reviewed this visit You Were Diagnosed With   
  
 Codes Comments Medicare annual wellness visit, subsequent    -  Primary ICD-10-CM: Z00.00 ICD-9-CM: V70.0 Pulmonary hypertension, moderate to severe     ICD-10-CM: I27.20 ICD-9-CM: 416.8 On statin therapy     ICD-10-CM: Z79.899 ICD-9-CM: V58.69 Arteriosclerotic coronary artery disease     ICD-10-CM: I25.10 ICD-9-CM: 414.00 Pure hypercholesterolemia     ICD-10-CM: E78.00 ICD-9-CM: 272.0 Essential hypertension     ICD-10-CM: I10 
ICD-9-CM: 401.9 Screening for diabetes mellitus     ICD-10-CM: Z13.1 ICD-9-CM: V77.1 Screening for ischemic heart disease     ICD-10-CM: Z13.6 ICD-9-CM: V81.0 Upper respiratory tract infection, unspecified type     ICD-10-CM: J06.9 ICD-9-CM: 465.9 Vitals BP Pulse Temp Resp Height(growth percentile) Weight(growth percentile)  130/78 (BP 1 Location: Left arm, BP Patient Position: Sitting) 66 98 °F (36.7 °C) (Oral) 18 5' 2\" (1.575 m) 149 lb (67.6 kg) SpO2 BMI OB Status Smoking Status 98% 27.25 kg/m2 Postmenopausal Never Smoker Vitals History BMI and BSA Data Body Mass Index Body Surface Area  
 27.25 kg/m 2 1.72 m 2 Preferred Pharmacy Pharmacy Name Phone [facility] [address] [facility], [address] AT [facility] [phone] Your Updated Medication List  
  
   
This list is accurate as of: 1/15/18 10:28 AM.  Always use your most recent med list.  
  
  
  
  
 azithromycin 250 mg tablet Commonly known as:  D[place] Bon Take 2 tablets initially, then one daily CENTRUM SILVER Tab tablet Generic drug:  multivitamins-minerals-lutein Take 1 Tab by mouth daily. clopidogrel 75 mg Tab Commonly known as:  PLAVIX TAKE 1 TABLET BY MOUTH EVERY DAY  
  
 pravastatin 40 mg tablet Commonly known as:  PRAVACHOL  
TAKE 1 TABLET BY MOUTH AT BEDTIME  
  
 valsartan-hydroCHLOROthiazide 160-12.5 mg per tablet Commonly known as:  DIOVAN-HCT Take 1 Tab by mouth daily. Prescriptions Printed Refills  
 azithromycin (ZITHROMAX) 250 mg tablet 0 Sig: Take 2 tablets initially, then one daily Class: Print We Performed the Following AMB POC CK (CPK) [61400 CPT(R)] AMB POC COMPREHENSIVE METABOLIC PANEL [55454 CPT(R)] AMB POC LIPID PROFILE [61784 CPT(R)] AMB POC URINALYSIS DIP STICK AUTO W/ MICRO  [40668 CPT(R)] Follow-up Instructions Return in about 6 months (around 7/15/2018) for follow up. Patient Instructions Medicare Wellness Visit, Female The best way to live healthy is to have a healthy lifestyle by eating a well-balanced diet, exercising regularly, limiting alcohol and stopping smoking. Regular physical exams and screening tests are another way to keep healthy.  Preventive exams provided by your health care provider can find health problems before they become diseases or illnesses. Preventive services including immunizations, screening tests, monitoring and exams can help you take care of your own health. All people over age 72 should have a pneumovax  and and a prevnar shot to prevent pneumonia. These are once in a lifetime unless you and your provider decide differently. All people over 65 should have a yearly flu shot and a tetanus vaccine every 10 years. A bone mass density to screen for osteoporosis or thinning of the bones should be done every 2 years after 65. Screening for diabetes mellitus with a blood sugar test should be done every year. Glaucoma is a disease of the eye due to increased ocular pressure that can lead to blindness and it should be done every year by an eye professional. 
 
Cardiovascular screening tests that check for elevated lipids (fatty part of blood) which can lead to heart disease and strokes should be done every 5 years. Colorectal screening that evaluates for blood or polyps in your colon should be done yearly as a stool test or every five years as a flexible sigmoidoscope or every 10 years as a colonoscopy up to age 76. Breast cancer screening with a mammogram is recommended biennially  for women age 54-69. Screening for cervical cancer with a pap smear and pelvic exam is recommended for women after age 72 years every 2 years up to age 79 or when the provider and patient decide to stop. If there is a history of cervical abnormalities or other increased risk for cancer then the test is recommended yearly. Hepatitis C screening is also recommended for anyone born between 80 through [address]. A shingles vaccine is also recommended once in a lifetime after age 61. Your Medicare Wellness Exam is recommended annually. Here is a list of your current Health Maintenance items with a due date: 
Health Maintenance Due Topic Date Due  
 DTaP/Tdap/Td  (1 - Tdap) 05/17/1953  Shingles Vaccine  03/17/1992  Glaucoma Screening   05/17/1997  Bone Density Screening  05/17/1997 [address] [place] [address] Annual Well Visit  05/17/1997  Pneumococcal Vaccine (2 of 2 - PPSV23) 01/01/2015 Beneficiaries Diagnosed with Pre-Diabetes Medicare provides coverage for a maximum of 2 diabetes screening tests within a 12-month period (but not less than 6 months apart) for beneficiaries diagnosed with pre-diabetes. Beneficiaries Previously Tested but not Diagnosed as Pre-Diabetic or Who Have Never Been Tested Medicare provides coverage for 1 diabetes screening test within a 12-month period (i.e., at least 11 months have passed following the month in which the last Medicare-covered diabetes screening test was performed) for beneficiaries who were previously tested and were not diagnosed with pre-diabetes, or who have never been tested. Risk Factors To be eligible for the diabetes screening tests, beneficiaries must have any of the following risk factors:  Hypertension,  Dyslipidemia, 
 Obesity (a body mass index greater than or equal to 30 kg/m), or 
 Previous identification of an elevated impaired fasting glucose or glucose tolerance. OR At least two of the following characteristics:  Overweight (a body mass index greater than 25 but less than 30 kg/m),  Family history of diabetes,  Aged 72 years and older, or  A history of gestational diabetes mellitus or delivery of a baby weighing greater than 9 pounds. Introducing [facility] [place] [facility]! [facility] introduces [employer] patient portal. Now you can access parts of your medical record, email your doctor's office, and request medication refills online. 1. In your internet browser, go to [email or web address] 2. Click on the First Time User? Click Here link in the Sign In box. You will see the New Member Sign Up page. 3. Enter your [employer] Access Code exactly as it appears below.  You will not need to use this code after youve completed the sign-up process. If you do not sign up before the expiration date, you must request a new code. · [email or web address] Access Code: [email or web address] Expires: 1/21/2018  2:54 PM 
 
4. Enter the last four digits of your Social Security Number (xxxx) and Date of Birth (mm/dd/yyyy) as indicated and click Submit. You will be taken to the next sign-up page. 5. Create a [employer] ID. This will be your [employer] login ID and cannot be changed, so think of one that is secure and easy to remember. 6. Create a [employer] password. You can change your password at any time. 7. Enter your Password Reset Question and Answer. This can be used at a later time if you forget your password. 8. Enter your e-mail address. You will receive e-mail notification when new information is available in [address] 9. Click Sign Up. You can now view and download portions of your medical record. 10. Click the Download Summary menu link to download a portable copy of your medical information. If you have questions, please visit the Frequently Asked Questions section of the [employer] website. Remember, [employer] is NOT to be used for urgent needs. For medical emergencies, dial 911. Now available from your iPhone and Android! Please provide this summary of care documentation to your next provider. Your primary care clinician is listed as MILADIS Prather. If you have any questions after today's visit, please call [phone].

## 2018-01-15 NOTE — PROGRESS NOTES
Chief Complaint   Patient presents with    Hypertension     6 month follow up; Room 5     1. Have you been to the ER, urgent care clinic since your last visit? Hospitalized since your last visit? No    2. Have you seen or consulted any other health care providers outside of the [facility] since your last visit? Include any pap smears or colon screening.  No

## 2018-01-15 NOTE — PATIENT INSTRUCTIONS
Medicare Wellness Visit, Female    The best way to live healthy is to have a healthy lifestyle by eating a well-balanced diet, exercising regularly, limiting alcohol and stopping smoking. Regular physical exams and screening tests are another way to keep healthy. Preventive exams provided by your health care provider can find health problems before they become diseases or illnesses. Preventive services including immunizations, screening tests, monitoring and exams can help you take care of your own health. All people over age 72 should have a pneumovax  and and a prevnar shot to prevent pneumonia. These are once in a lifetime unless you and your provider decide differently. All people over 65 should have a yearly flu shot and a tetanus vaccine every 10 years. A bone mass density to screen for osteoporosis or thinning of the bones should be done every 2 years after 65. Screening for diabetes mellitus with a blood sugar test should be done every year. Glaucoma is a disease of the eye due to increased ocular pressure that can lead to blindness and it should be done every year by an eye professional.    Cardiovascular screening tests that check for elevated lipids (fatty part of blood) which can lead to heart disease and strokes should be done every 5 years. Colorectal screening that evaluates for blood or polyps in your colon should be done yearly as a stool test or every five years as a flexible sigmoidoscope or every 10 years as a colonoscopy up to age 76. Breast cancer screening with a mammogram is recommended biennially  for women age 54-69. Screening for cervical cancer with a pap smear and pelvic exam is recommended for women after age 72 years every 2 years up to age 79 or when the provider and patient decide to stop. If there is a history of cervical abnormalities or other increased risk for cancer then the test is recommended yearly.     Hepatitis C screening is also recommended for anyone born between 80 through [address]. A shingles vaccine is also recommended once in a lifetime after age 61. Your Medicare Wellness Exam is recommended annually. Here is a list of your current Health Maintenance items with a due date:  Health Maintenance Due   Topic Date Due    DTaP/Tdap/Td  (1 - Tdap) 05/17/1953    Shingles Vaccine  03/17/1992    Glaucoma Screening   05/17/1997    Bone Density Screening  05/17/1997    Annual Well Visit  05/17/1997    Pneumococcal Vaccine (2 of 2 - PPSV23) 01/01/2015       Beneficiaries Diagnosed with Pre-Diabetes  Medicare provides coverage for a maximum of 2 diabetes screening tests within a 12-month period (but not less than 6 months apart) for beneficiaries diagnosed with pre-diabetes. Beneficiaries Previously Tested but not Diagnosed as Pre-Diabetic or Who Have Never Been Tested  Medicare provides coverage for 1 diabetes screening test within a 12-month period (i.e., at least 11 months have passed following the month in which the last Medicare-covered diabetes screening test was performed) for beneficiaries who were previously tested and were not diagnosed with pre-diabetes, or who have never been tested. Risk Factors  To be eligible for the diabetes screening tests, beneficiaries must have any of the following risk factors:   Hypertension,   Dyslipidemia,   Obesity (a body mass index greater than or equal to 30 kg/m), or   Previous identification of an elevated impaired fasting glucose or glucose tolerance. OR  At least two of the following characteristics:   Overweight (a body mass index greater than 25 but less than 30 kg/m),   Family history of diabetes,   Aged 72 years and older, or   A history of gestational diabetes mellitus or delivery of a baby weighing greater than 9 pounds.

## 2018-01-15 NOTE — PROGRESS NOTES
This is a Subsequent Medicare Annual Wellness Exam (AWV) (Performed 12 months after IPPE or effective date of Medicare Part B enrollment)    I have reviewed the patient's medical history in detail and updated the computerized patient record. History     Past Medical History:   Diagnosis Date    AK (actinic keratosis) 7/27/2017    Arteriosclerotic coronary artery disease 7/27/2017    Arthritis of right knee 7/27/2017    Back pain 7/27/2017    Carotid atherosclerosis 7/27/2017    Cough 7/27/2017    Fatigue 7/27/2017    History of pneumonia 7/27/2017    HTN (hypertension) 7/27/2017    Hyperlipidemia 7/27/2017    Hypertension     Need for Streptococcus pneumoniae vaccination 7/27/2017    On statin therapy 7/27/2017    Plantar fasciitis 7/27/2017    Pneumonia     Shoulder sprain 7/27/2017    Syncope 7/27/2017      Past Surgical History:   Procedure Laterality Date    HX GI      ruptured intestine     Current Outpatient Prescriptions   Medication Sig Dispense Refill    valsartan-hydroCHLOROthiazide (DIOVAN-HCT) 160-12.5 mg per tablet Take 1 Tab by mouth daily. 30 Tab 6    pravastatin (PRAVACHOL) 40 mg tablet TAKE 1 TABLET BY MOUTH AT BEDTIME  11    clopidogrel (PLAVIX) 75 mg tab TAKE 1 TABLET BY MOUTH EVERY DAY  11    multivitamins-minerals-lutein (CENTRUM SILVER) Tab Take 1 Tab by mouth daily.          No Known Allergies  Family History   Problem Relation Age of Onset    Hypertension Mother     Heart Disease Father      Social History   Substance Use Topics    Smoking status: Never Smoker    Smokeless tobacco: Never Used    Alcohol use No     Patient Active Problem List   Diagnosis Code    Pneumonia- lobar J18.9    UTI (lower urinary tract infection) N39.0    Hyponatremia E87.1    Hyperglycemia R73.9    ASD (atrial septal defect) VS PFO on echo Q21.1    Pulmonary hypertension, moderate to severe- on echo I27.20    Other pulmonary embolism and infarction I26.99    SOB (shortness of breath) R06.02    AK (actinic keratosis) L57.0    Arthritis of right knee M17.11    On statin therapy Z79.899    Arteriosclerotic coronary artery disease I25.10    Hyperlipidemia E78.5    Carotid atherosclerosis I65.29    HTN (hypertension) I10    History of pneumonia Z87.01    Cough R05    Back pain M54.9    Shoulder sprain S43.409A    Plantar fasciitis M72.2    Fatigue R53.83    Syncope R55    Need for Streptococcus pneumoniae vaccination Z23       Depression Risk Factor Screening:     PHQ over the last two weeks 7/27/2017   Little interest or pleasure in doing things Not at all   Feeling down, depressed or hopeless Not at all   Total Score PHQ 2 0     Alcohol Risk Factor Screening: You do not drink alcohol or very rarely. Functional Ability and Level of Safety:   Hearing Loss  Hearing is good. Activities of Daily Living  The home contains: no safety equipment. Patient does total self care    Fall Risk  Fall Risk Assessment, last 12 mths 7/27/2017   Able to walk? Yes   Fall in past 12 months? No       Abuse Screen  Patient is not abused    Cognitive Screening   Evaluation of Cognitive Function:  Has your family/caregiver stated any concerns about your memory: no  Normal    Patient Care Team   Patient Care Team:  Haily Anderson MD as PCP - General (Internal Medicine)    Assessment/Plan   Education and counseling provided:  Are appropriate based on today's review and evaluation    Diagnoses and all orders for this visit:    1. Pulmonary hypertension, moderate to severe- on echo    2. On statin therapy    3. Arteriosclerotic coronary artery disease    4. Pure hypercholesterolemia    5. Essential hypertension    6. Medicare annual wellness visit, subsequent    7. Screening for diabetes mellitus    8.  Screening for ischemic heart disease        Health Maintenance Due   Topic Date Due    DTaP/Tdap/Td series (1 - Tdap) 05/17/1953    ZOSTER VACCINE AGE 60>  03/17/1992    GLAUCOMA SCREENING Q2Y  05/17/1997    OSTEOPOROSIS SCREENING (DEXA)  05/17/1997    MEDICARE YEARLY EXAM  05/17/1997    Pneumococcal 65+ Low/Medium Risk (2 of 2 - PPSV23) 01/01/2015     This note will not be viewable in MyChart. Ben West is a 80 y.o. female and presents with Hypertension (6 month follow up; Room 5)  . Subjective:  Mrs. Evins Alpers presents today for follow-up of hypertension, hyperlipidemia, and coronary artery disease. She has not had any symptoms of chest pain, shortness of breath, palpitations, PND, orthopnea, or pedal edema. She is doing well on her current medical regimen and denies any side effects from her medications. Past Medical History:   Diagnosis Date    AK (actinic keratosis) 7/27/2017    Arteriosclerotic coronary artery disease 7/27/2017    Arthritis of right knee 7/27/2017    Back pain 7/27/2017    Carotid atherosclerosis 7/27/2017    Cough 7/27/2017    Fatigue 7/27/2017    History of pneumonia 7/27/2017    HTN (hypertension) 7/27/2017    Hyperlipidemia 7/27/2017    Hypertension     Need for Streptococcus pneumoniae vaccination 7/27/2017    On statin therapy 7/27/2017    Plantar fasciitis 7/27/2017    Pneumonia     Shoulder sprain 7/27/2017    Syncope 7/27/2017     Past Surgical History:   Procedure Laterality Date    HX GI      ruptured intestine     No Known Allergies  Current Outpatient Prescriptions   Medication Sig Dispense Refill    azithromycin (ZITHROMAX) 250 mg tablet Take 2 tablets initially, then one daily 6 Tab 0    valsartan-hydroCHLOROthiazide (DIOVAN-HCT) 160-12.5 mg per tablet Take 1 Tab by mouth daily. 30 Tab 6    pravastatin (PRAVACHOL) 40 mg tablet TAKE 1 TABLET BY MOUTH AT BEDTIME  11    clopidogrel (PLAVIX) 75 mg tab TAKE 1 TABLET BY MOUTH EVERY DAY  11    multivitamins-minerals-lutein (CENTRUM SILVER) Tab Take 1 Tab by mouth daily.          Social History     Social History    Marital status: UNKNOWN     Spouse name: N/A    Number of children: N/A    Years of education: N/A     Social History Main Topics    Smoking status: Never Smoker    Smokeless tobacco: Never Used    Alcohol use No    Drug use: No    Sexual activity: Not Asked     Other Topics Concern    None     Social History Narrative     Family History   Problem Relation Age of Onset    Hypertension Mother     Heart Disease Father        Review of Systems  Constitutional:  negative for fevers, chills, anorexia and weight loss  Eyes:    negative for visual disturbance and irritation  ENT:    negative for tinnitus,sore throat,nasal congestion,ear pains. hoarseness  Respiratory:     negative for cough, hemoptysis, dyspnea,wheezing  CV:    negative for chest pain, palpitations, lower extremity edema  GI:    negative for nausea, vomiting, diarrhea, abdominal pain,melena  Endo:               negative for polyuria,polydipsia,polyphagia,heat intolerance  Genitourinary : negative for frequency, dysuria and hematuria  Integumentary: negative for rash and pruritus  Hematologic:   negative for easy bruising and gum/nose bleeding  Musculoskel:  negative for myalgias, arthralgias, back pain, muscle weakness, joint pain  Neurological:   negative for headaches, dizziness, vertigo, memory problems and gait   Behavl/Psych:  negative for feelings of anxiety, depression, mood changes  ROS otherwise negative      Objective:  Visit Vitals    /78 (BP 1 Location: Left arm, BP Patient Position: Sitting)    Pulse 66    Temp 98 °F (36.7 °C) (Oral)    Resp 18    Ht 5' 2\" (1.575 m)    Wt 149 lb (67.6 kg)    SpO2 98%    BMI 27.25 kg/m2     Physical Exam:   General appearance - alert, well appearing, and in no distress  Mental status - alert, oriented to person, place, and time  EYE-PARESH, EOMI, fundi normal, corneas normal, no foreign bodies  ENT-ENT exam normal, no neck nodes or sinus tenderness  Nose - normal and patent, no erythema, discharge or polyps  Mouth - mucous membranes moist, pharynx normal without lesions  Neck - supple, no significant adenopathy   Chest - clear to auscultation, no wheezes, rales or rhonchi, symmetric air entry   Heart - normal rate, regular rhythm, normal S1, S2, no murmurs, rubs, clicks or gallops   Abdomen - soft, nontender, nondistended, no masses or organomegaly  Lymph- no adenopathy palpable  Ext-peripheral pulses normal, no pedal edema, no clubbing or cyanosis  Skin-Warm and dry. no hyperpigmentation, vitiligo, or suspicious lesions  Neuro -alert, oriented, normal speech, no focal findings or movement disorder noted      Assessment/Plan:  Diagnoses and all orders for this visit:    1. Medicare annual wellness visit, subsequent    2. Pulmonary hypertension, moderate to severe- on echo    3. On statin therapy  -     AMB POC CK (CPK)    4. Arteriosclerotic coronary artery disease    5. Pure hypercholesterolemia  -     AMB POC LIPID PROFILE    6. Essential hypertension  -     AMB POC COMPREHENSIVE METABOLIC PANEL  -     AMB POC URINALYSIS DIP STICK AUTO W/ MICRO     7. Screening for diabetes mellitus    8. Screening for ischemic heart disease    9. Upper respiratory tract infection, unspecified type    Other orders  -     azithromycin (ZITHROMAX) 250 mg tablet; Take 2 tablets initially, then one daily          ICD-10-CM ICD-9-CM    1. Medicare annual wellness visit, subsequent Z00.00 V70.0    2. Pulmonary hypertension, moderate to severe- on echo I27.20 416.8    3. On statin therapy Z79.899 V58.69 AMB POC CK (CPK)   4. Arteriosclerotic coronary artery disease I25.10 414.00    5. Pure hypercholesterolemia E78.00 272.0 AMB POC LIPID PROFILE   6. Essential hypertension I10 401.9 AMB POC COMPREHENSIVE METABOLIC PANEL      AMB POC URINALYSIS DIP STICK AUTO W/ MICRO    7. Screening for diabetes mellitus Z13.1 V77.1    8. Screening for ischemic heart disease Z13.6 V81.0    9.  Upper respiratory tract infection, unspecified type J06.9 465.9    Plan:    Patient is doing well on her current medical regimen. Further recommendations based on lab results. She is given a prescription for azithromycin to take if needed for seasonal sinusitis/bronchitis. Follow-up Disposition:  Return in about 6 months (around 7/15/2018) for follow up. I have reviewed with the patient details of the assessment and plan and all questions were answered. Relevent patient education was performed. Verbal and/or written instructions (see AVS) provided. The most recent lab findings were reviewed with the patient. Plan was discussed with patient who verbally expressed understanding. An After Visit Summary was printed and given to the patient.     Regis Hooks MD

## 2018-01-16 LAB
ALBUMIN SERPL-MCNC: 4.8 G/DL (ref 3.9–5.4)
ALKALINE PHOS POC: 72 U/L (ref 38–126)
ALT SERPL-CCNC: 20 U/L (ref 9–52)
AST SERPL-CCNC: 22 U/L (ref 14–36)
BACTERIA UA POCT, BACTPOCT: NORMAL
BILIRUB UR QL STRIP: NEGATIVE
BUN BLD-MCNC: 16 MG/DL (ref 7–17)
CALCIUM BLD-MCNC: 9.9 MG/DL (ref 8.4–10.2)
CASTS UA POCT: NORMAL
CHLORIDE BLD-SCNC: 101 MMOL/L (ref 98–107)
CHOLEST SERPL-MCNC: 205 MG/DL (ref 0–200)
CK (CPK) POC: 34 U/L (ref 30–135)
CLUE CELLS, CLUEPOCT: NEGATIVE
CO2 POC: 31 MMOL/L (ref 22–32)
CREAT BLD-MCNC: 0.7 MG/DL (ref 0.7–1.2)
CRYSTALS UA POCT, CRYSPOCT: NEGATIVE
EGFR (POC): 79
EPITHELIAL CELLS POCT: NORMAL
GLUCOSE POC: 125 MG/DL (ref 65–105)
GLUCOSE UR-MCNC: NEGATIVE MG/DL
HDLC SERPL-MCNC: 66 MG/DL (ref 35–130)
KETONES P FAST UR STRIP-MCNC: NEGATIVE MG/DL
LDL CHOLESTEROL POC: 102.4 MG/DL (ref 0–130)
MUCUS UA POCT, MUCPOCT: NORMAL
PH UR STRIP: 5 [PH] (ref 5–7)
POTASSIUM SERPL-SCNC: 4.7 MMOL/L (ref 3.6–5)
PROT SERPL-MCNC: 7.8 G/DL (ref 6.3–8.2)
PROT UR QL STRIP: NEGATIVE
RBC UA POCT, RBCPOCT: NORMAL
SODIUM SERPL-SCNC: 144 MMOL/L (ref 137–145)
SP GR UR STRIP: 1.02 (ref 1.01–1.02)
TCHOL/HDL RATIO (POC): 3.1 (ref 0–4)
TOTAL BILIRUBIN POC: 0.9 MG/DL (ref 0.2–1.3)
TRICH UA POCT, TRICHPOC: NEGATIVE
TRIGL SERPL-MCNC: 183 MG/DL (ref 0–200)
UA UROBILINOGEN AMB POC: NORMAL (ref 0.2–1)
URINALYSIS CLARITY POC: CLEAR
URINALYSIS COLOR POC: NORMAL
URINE BLOOD POC: NEGATIVE
URINE CULT COMMENT, POCT: NORMAL
URINE LEUKOCYTES POC: NORMAL
URINE NITRITES POC: NEGATIVE
VLDLC SERPL CALC-MCNC: 36.6 MG/DL
WBC UA POCT, WBCPOCT: NORMAL
YEAST UA POCT, YEASTPOC: NEGATIVE

## 2018-03-05 RX ORDER — VALSARTAN AND HYDROCHLOROTHIAZIDE 160; 12.5 MG/1; MG/1
TABLET, FILM COATED ORAL
Qty: 90 TAB | Refills: 6 | Status: SHIPPED | OUTPATIENT
Start: 2018-03-05 | End: 2019-09-28 | Stop reason: SDUPTHER

## 2018-08-01 ENCOUNTER — OFFICE VISIT (OUTPATIENT)
Dept: INTERNAL MEDICINE CLINIC | Age: 83
End: 2018-08-01

## 2018-08-01 VITALS
BODY MASS INDEX: 28.49 KG/M2 | SYSTOLIC BLOOD PRESSURE: 138 MMHG | HEART RATE: 60 BPM | HEIGHT: 62 IN | RESPIRATION RATE: 20 BRPM | DIASTOLIC BLOOD PRESSURE: 70 MMHG | WEIGHT: 154.8 LBS | TEMPERATURE: 98 F | OXYGEN SATURATION: 96 %

## 2018-08-01 DIAGNOSIS — Z79.899 ON STATIN THERAPY: ICD-10-CM

## 2018-08-01 DIAGNOSIS — I25.10 ARTERIOSCLEROTIC CORONARY ARTERY DISEASE: ICD-10-CM

## 2018-08-01 DIAGNOSIS — I27.20 PULMONARY HYPERTENSION, MODERATE TO SEVERE (HCC): Primary | ICD-10-CM

## 2018-08-01 DIAGNOSIS — E78.00 PURE HYPERCHOLESTEROLEMIA: ICD-10-CM

## 2018-08-01 DIAGNOSIS — I10 ESSENTIAL HYPERTENSION: ICD-10-CM

## 2018-08-01 RX ORDER — AZITHROMYCIN 250 MG/1
TABLET, FILM COATED ORAL
Qty: 6 TAB | Refills: 0 | Status: SHIPPED | OUTPATIENT
Start: 2018-08-01 | End: 2018-08-06

## 2018-08-01 NOTE — PROGRESS NOTES
Chief Complaint Patient presents with  Hypertension 6 month follow up 1. Have you been to the ER, urgent care clinic since your last visit? No   Hospitalized since your last visit? No   
 
2. Have you seen or consulted any other health care providers outside of the [address] [place] [address] since your last visit? No   
 
Patient taking Valsartan/HCTZ taking 1/2 tablet daily

## 2018-08-01 NOTE — PROGRESS NOTES
This note will not be viewable in [address] Alaina Santos is a 80 y.o. female and presents with Hypertension (6 month follow up) Daniel Altman Subjective: This is Levander Skill presents today for follow-up of hypertension, hyperlipidemia, coronary disease, monitoring of statin therapy, and carotid artery disease. She denies any side effects from her medications. She has not had any shortness of breath, chest pain, PND, orthopnea, or pedal edema. She had a previous history of PE several years ago after traveling for an extended distance and had been anticoagulated appropriately with no recurrence. She feels well and goes walking regularly. Past Medical History:  
Diagnosis Date  AK (actinic keratosis) 7/27/2017  Arteriosclerotic coronary artery disease 7/27/2017  Arthritis of right knee 7/27/2017  Back pain 7/27/2017  Carotid atherosclerosis 7/27/2017  Cough 7/27/2017  Fatigue 7/27/2017  History of pneumonia 7/27/2017  
 HTN (hypertension) 7/27/2017  Hyperlipidemia 7/27/2017  Hypertension  Need for Streptococcus pneumoniae vaccination 7/27/2017  On statin therapy 7/27/2017  Plantar fasciitis 7/27/2017  Pneumonia  Shoulder sprain 7/27/2017  Syncope 7/27/2017 Past Surgical History:  
Procedure Laterality Date  HX GI    
 ruptured intestine No Known Allergies Current Outpatient Prescriptions Medication Sig Dispense Refill  azithromycin (ZITHROMAX) 250 mg tablet Take 2 tablets initially, then one daily 6 Tab 0  
 valsartan-hydroCHLOROthiazide (DIOVAN-HCT) 160-12.5 mg per tablet TAKE 1 TABLET BY MOUTH ONCE DAILY (Patient taking differently: TAKE 1 TABLET BY MOUTH ONCE DAILY patient only taking 1/2 tablet daily) 90 Tab 6  pravastatin (PRAVACHOL) 40 mg tablet TAKE 1 TABLET BY MOUTH AT BEDTIME  11  
 clopidogrel (PLAVIX) 75 mg tab TAKE 1 TABLET BY MOUTH EVERY DAY  11  
 multivitamins-minerals-lutein (CENTRUM SILVER) Tab Take 1 Tab by mouth daily. Social History Social History  Marital status: UNKNOWN Spouse name: N/A  
 Number of children: N/A  
 Years of education: N/A Social History Main Topics  Smoking status: Never Smoker  Smokeless tobacco: Never Used  Alcohol use No  
 Drug use: No  
 Sexual activity: No  
 
Other Topics Concern  None Social History Narrative Family History Problem Relation Age of Onset  Hypertension Mother  Heart Disease Father Review of Systems Constitutional:  negative for fevers, chills, anorexia and weight loss Eyes:    negative for visual disturbance and irritation ENT:    negative for tinnitus,sore throat,nasal congestion,ear pains. hoarseness Respiratory:     negative for cough, hemoptysis, dyspnea,wheezing CV:    negative for chest pain, palpitations, lower extremity edema GI:    negative for nausea, vomiting, diarrhea, abdominal pain,melena Endo:               negative for polyuria,polydipsia,polyphagia,heat intolerance Genitourinary : negative for frequency, dysuria and hematuria Integumentary: negative for rash and pruritus Hematologic:   negative for easy bruising and gum/nose bleeding Musculoskel:  negative for myalgias, arthralgias, back pain, muscle weakness, joint pain Neurological:   negative for headaches, dizziness, vertigo, memory problems and gait Behavl/Psych:  negative for feelings of anxiety, depression, mood changes ROS otherwise negative Objective: 
Visit Vitals  /68 (BP 1 Location: Left arm, BP Patient Position: Sitting)  Pulse 60  Temp 98 °F (36.7 °C) (Oral)  Resp 20  
 Ht 5' 2\" (1.575 m)  Wt 154 lb 12.8 oz (70.2 kg)  SpO2 96%  BMI 28.31 kg/m2 Physical Exam:  
General appearance - alert, well appearing, and in no distress Mental status - alert, oriented to person, place, and time EYE-PARESH, EOMI, fundi normal, corneas normal, no foreign bodies ENT-ENT exam normal, no neck nodes or sinus tenderness Nose - normal and patent, no erythema, discharge or polyps Mouth - mucous membranes moist, pharynx normal without lesions Neck - supple, no significant adenopathy Chest - clear to auscultation, no wheezes, rales or rhonchi, symmetric air entry Heart - normal rate, regular rhythm, normal S1, S2, no murmurs, rubs, clicks or gallops Abdomen - soft, nontender, nondistended, no masses or organomegaly Lymph- no adenopathy palpable Ext-peripheral pulses normal, no pedal edema, no clubbing or cyanosis Skin-Warm and dry. no hyperpigmentation, vitiligo, or suspicious lesions Neuro -alert, oriented, normal speech, no focal findings or movement disorder noted Assessment/Plan: 
Diagnoses and all orders for this visit: 1. Pulmonary hypertension, moderate to severe- on echo 2. Arteriosclerotic coronary artery disease 3. Essential hypertension -     METABOLIC PANEL, COMPREHENSIVE 4. Pure hypercholesterolemia -     LIPID PANEL 
 
5. On statin therapy 
-     CK 
-     METABOLIC PANEL, COMPREHENSIVE Other orders 
-     azithromycin (ZITHROMAX) 250 mg tablet; Take 2 tablets initially, then one daily ICD-10-CM ICD-9-CM 1. Pulmonary hypertension, moderate to severe- on echo I27.20 416.8 2. Arteriosclerotic coronary artery disease I25.10 414.00   
3. Essential hypertension B53 880.2 METABOLIC PANEL, COMPREHENSIVE 4. Pure hypercholesterolemia E78.00 272.0 LIPID PANEL  
5. On statin therapy Z79.899 V58.69 CK METABOLIC PANEL, COMPREHENSIVE Plan: The patient will continue current medical regimen as outlined above. He will be given a prescription for Z-Jesus to take as directed if needed for a history of bronchitis. Follow-up Disposition: 
Return in about 6 months (around 2/1/2019) for [address] [place] [address] have reviewed with the patient details of the assessment and plan and all questions were answered. Relevent patient education was performed.  Verbal and/or written instructions (see AVS) provided. The most recent lab findings were reviewed with the patient. Plan was discussed with patient who verbally expressed understanding. An After Visit Summary was printed and given to the patient.  
 
Mo Mejia MD

## 2018-08-01 NOTE — MR AVS SNAPSHOT
[facility] [address] [address] [phone] Patient: Parmjit Hernandez MRN: [id number] QWN:[id number] Visit Information Date & Time Provider Department Dept. Phone Encounter #  
 8/1/2018  8:50 AM MILADIS Reyes MD [facility] [phone] [id number] Follow-up Instructions Return in about 6 months (around 2/1/2019) for [address] Upcoming Health Maintenance Date Due DTaP/Tdap/Td series (1 - Tdap) [birth year]/17/1953 ZOSTER VACCINE AGE 60> 3/17/1992 GLAUCOMA SCREENING Q2Y 5/17/1997 Bone Densitometry (Dexa) Screening 5/17/1997 Pneumococcal 65+ Low/Medium Risk (2 of 2 - PPSV23) 1/1/2015 Influenza Age 5 to Adult 8/1/2018 MEDICARE YEARLY EXAM 1/16/2019 Allergies as of 8/1/2018  Review Complete On: 8/1/2018 By: Carmela Hodgson MD  
 No Known Allergies Current Immunizations  Reviewed on 9/26/2012 Name Date Influenza Vaccine 12/1/2010 Influenza Vaccine Split 10/14/2010 11:55 AM  
 Influenza Vaccine Whole 9/10/2012 ZZZ-RETIRED (DO NOT USE) Pneumococcal Vaccine (Unspecified Type) 1/1/2010 Not reviewed this visit You Were Diagnosed With   
  
 Codes Comments Pulmonary hypertension, moderate to severe ([facility] [address]    -  Primary ICD-10-CM: I27.20 ICD-9-CM: 416.8 Arteriosclerotic coronary artery disease     ICD-10-CM: I25.10 ICD-9-CM: 414.00 Essential hypertension     ICD-10-CM: I10 
ICD-9-CM: 401.9 Pure hypercholesterolemia     ICD-10-CM: E78.00 ICD-9-CM: 272.0 On statin therapy     ICD-10-CM: Z79.899 ICD-9-CM: V58.69 Vitals BP Pulse Temp Resp Height(growth percentile) Weight(growth percentile) 138/70 (BP 1 Location: Left arm, BP Patient Position: Sitting) 60 98 °F (36.7 °C) (Oral) 20 5' 2\" (1.575 m) 154 lb 12.8 oz (70.2 kg) SpO2 BMI OB Status Smoking Status 96% 28.31 kg/m2 Postmenopausal Never Smoker Vitals History BMI and BSA Data Body Mass Index Body Surface Area  
 28.31 kg/m 2 1.75 m 2 Preferred Pharmacy Pharmacy Name Phone [phone], [address] AT [place] [address] [phone] Your Updated Medication List  
  
   
This list is accurate as of 8/1/18  9:36 AM.  Always use your most recent med list.  
  
  
  
  
 azithromycin 250 mg tablet Commonly known as:  Morrill Sleight Take 2 tablets initially, then one daily CENTRUM SILVER Tab tablet Generic drug:  multivitamins-minerals-lutein Take 1 Tab by mouth daily. clopidogrel 75 mg Tab Commonly known as:  PLAVIX TAKE 1 TABLET BY MOUTH EVERY DAY  
  
 pravastatin 40 mg tablet Commonly known as:  PRAVACHOL  
TAKE 1 TABLET BY MOUTH AT BEDTIME  
  
 valsartan-hydroCHLOROthiazide 160-12.5 mg per tablet Commonly known as:  DIOVAN-HCT  
TAKE 1 TABLET BY MOUTH ONCE DAILY Prescriptions Printed Refills  
 azithromycin (ZITHROMAX) 250 mg tablet 0 Sig: Take 2 tablets initially, then one daily Class: Print We Performed the Following CK [id number] CPT(R)] LIPID PANEL [54432 CPT(R)] METABOLIC PANEL, COMPREHENSIVE [71994 CPT(R)] Follow-up Instructions Return in about 6 months (around 2/1/2019) for [facility]! [facility] introduces Futurefleet patient portal. Now you can access parts of your medical record, email your doctor's office, and request medication refills online. 1. In your internet browser, go to [email or web address] 2. Click on the First Time User? Click Here link in the Sign In box. You will see the New Member Sign Up page. 3. Enter your Futurefleet Access Code exactly as it appears below. You will not need to use this code after youve completed the sign-up process. If you do not sign up before the expiration date, you must request a new code. · Nimbuzz Access Code: [email or web address] Expires: 10/30/2018  9:36 AM 
 
4. Enter the last four digits of your Social Security Number (xxxx) and Date of Birth (mm/dd/yyyy) as indicated and click Submit. You will be taken to the next sign-up page. 5. Create a Nimbuzz ID. This will be your Nimbuzz login ID and cannot be changed, so think of one that is secure and easy to remember. 6. Create a Nimbuzz password. You can change your password at any time. 7. Enter your Password Reset Question and Answer. This can be used at a later time if you forget your password. 8. Enter your e-mail address. You will receive e-mail notification when new information is available in [address] 9. Click Sign Up. You can now view and download portions of your medical record. 10. Click the Download Summary menu link to download a portable copy of your medical information. If you have questions, please visit the Frequently Asked Questions section of the Nimbuzz website. Remember, Nimbuzz is NOT to be used for urgent needs. For medical emergencies, dial 911. Now available from your iPhone and Android! Please provide this summary of care documentation to your next provider. Your primary care clinician is listed as MILADIS Valdes. If you have any questions after today's visit, please call [phone].

## 2018-08-02 LAB
ALBUMIN SERPL-MCNC: 4.6 G/DL (ref 3.5–4.7)
ALBUMIN/GLOB SERPL: 1.7 {RATIO} (ref 1.2–2.2)
ALP SERPL-CCNC: 78 IU/L (ref 39–117)
ALT SERPL-CCNC: 12 IU/L (ref 0–32)
AST SERPL-CCNC: 17 IU/L (ref 0–40)
BILIRUB SERPL-MCNC: 0.6 MG/DL (ref 0–1.2)
BUN SERPL-MCNC: 19 MG/DL (ref 8–27)
BUN/CREAT SERPL: 23 (ref 12–28)
CALCIUM SERPL-MCNC: 9.9 MG/DL (ref 8.7–10.3)
CHLORIDE SERPL-SCNC: 99 MMOL/L (ref 96–106)
CHOLEST SERPL-MCNC: 198 MG/DL (ref 100–199)
CK SERPL-CCNC: 44 U/L (ref 24–173)
CO2 SERPL-SCNC: 26 MMOL/L (ref 20–29)
CREAT SERPL-MCNC: 0.83 MG/DL (ref 0.57–1)
GLOBULIN SER CALC-MCNC: 2.7 G/DL (ref 1.5–4.5)
GLUCOSE SERPL-MCNC: 134 MG/DL (ref 65–99)
HDLC SERPL-MCNC: 63 MG/DL
LDLC SERPL CALC-MCNC: 105 MG/DL (ref 0–99)
POTASSIUM SERPL-SCNC: 4.4 MMOL/L (ref 3.5–5.2)
PROT SERPL-MCNC: 7.3 G/DL (ref 6–8.5)
SODIUM SERPL-SCNC: 141 MMOL/L (ref 134–144)
TRIGL SERPL-MCNC: 151 MG/DL (ref 0–149)
VLDLC SERPL CALC-MCNC: 30 MG/DL (ref 5–40)

## 2019-01-03 ENCOUNTER — DOCUMENTATION ONLY (OUTPATIENT)
Dept: INTERNAL MEDICINE CLINIC | Age: 84
End: 2019-01-03

## 2019-02-14 ENCOUNTER — OFFICE VISIT (OUTPATIENT)
Dept: INTERNAL MEDICINE CLINIC | Age: 84
End: 2019-02-14

## 2019-02-14 VITALS
DIASTOLIC BLOOD PRESSURE: 72 MMHG | TEMPERATURE: 97.9 F | OXYGEN SATURATION: 98 % | HEIGHT: 62 IN | HEART RATE: 60 BPM | SYSTOLIC BLOOD PRESSURE: 138 MMHG | BODY MASS INDEX: 28.16 KG/M2 | RESPIRATION RATE: 16 BRPM | WEIGHT: 153 LBS

## 2019-02-14 DIAGNOSIS — Z23 ENCOUNTER FOR IMMUNIZATION: ICD-10-CM

## 2019-02-14 DIAGNOSIS — M94.9 DISORDER OF BONE AND CARTILAGE: ICD-10-CM

## 2019-02-14 DIAGNOSIS — Z13.39 SCREENING FOR ALCOHOLISM: ICD-10-CM

## 2019-02-14 DIAGNOSIS — I25.10 ARTERIOSCLEROTIC CORONARY ARTERY DISEASE: ICD-10-CM

## 2019-02-14 DIAGNOSIS — E78.00 PURE HYPERCHOLESTEROLEMIA: ICD-10-CM

## 2019-02-14 DIAGNOSIS — M89.9 DISORDER OF BONE AND CARTILAGE: ICD-10-CM

## 2019-02-14 DIAGNOSIS — Z13.31 SCREENING FOR DEPRESSION: ICD-10-CM

## 2019-02-14 DIAGNOSIS — Z12.31 ENCOUNTER FOR SCREENING MAMMOGRAM FOR MALIGNANT NEOPLASM OF BREAST: ICD-10-CM

## 2019-02-14 DIAGNOSIS — N39.0 URINARY TRACT INFECTION WITHOUT HEMATURIA, SITE UNSPECIFIED: ICD-10-CM

## 2019-02-14 DIAGNOSIS — I10 ESSENTIAL HYPERTENSION: ICD-10-CM

## 2019-02-14 DIAGNOSIS — Z00.00 MEDICARE ANNUAL WELLNESS VISIT, SUBSEQUENT: Primary | ICD-10-CM

## 2019-02-14 DIAGNOSIS — R09.89 RIGHT CAROTID BRUIT: ICD-10-CM

## 2019-02-14 DIAGNOSIS — Z79.899 ON STATIN THERAPY: ICD-10-CM

## 2019-02-14 LAB
A-G RATIO,AGRAT: 1.8 RATIO
ALBUMIN SERPL-MCNC: 4.6 G/DL (ref 3.9–5.4)
ALP SERPL-CCNC: 76 U/L (ref 38–126)
ALT SERPL-CCNC: 31 U/L (ref 9–52)
ANION GAP SERPL CALC-SCNC: 12 MMOL/L
AST SERPL W P-5'-P-CCNC: 24 U/L (ref 14–36)
BACTERIA,BACTU: ABNORMAL
BILIRUB SERPL-MCNC: 0.4 MG/DL (ref 0.2–1.3)
BILIRUB UR QL: NEGATIVE
BUN SERPL-MCNC: 18 MG/DL (ref 7–17)
BUN/CREATININE RATIO,BUCR: 30 RATIO
CALCIUM SERPL-MCNC: 9.5 MG/DL (ref 8.4–10.2)
CHLORIDE SERPL-SCNC: 103 MMOL/L (ref 98–107)
CHOL/HDL RATIO,CHHD: 2 RATIO (ref 0–4)
CHOLEST SERPL-MCNC: 181 MG/DL (ref 0–200)
CK SERPL-CCNC: 30 U/L (ref 30–135)
CLARITY: CLEAR
CO2 SERPL-SCNC: 28 MMOL/L (ref 22–32)
COLOR UR: ABNORMAL
CREAT SERPL-MCNC: 0.6 MG/DL (ref 0.7–1.2)
GLOBULIN,GLOB: 2.6
GLUCOSE 24H UR-MRATE: NEGATIVE G/(24.H)
GLUCOSE SERPL-MCNC: 123 MG/DL (ref 65–105)
HDLC SERPL-MCNC: 76 MG/DL (ref 35–130)
HGB UR QL STRIP: NEGATIVE
KETONES UR QL STRIP.AUTO: NEGATIVE
LDL/HDL RATIO,LDHD: 1 RATIO
LDLC SERPL CALC-MCNC: 81 MG/DL (ref 0–130)
LEUKOCYTE ESTERASE: ABNORMAL
NITRITE UR QL STRIP.AUTO: NEGATIVE
PH UR STRIP: 6 [PH] (ref 5–7)
POTASSIUM SERPL-SCNC: 4 MMOL/L (ref 3.6–5)
PROT SERPL-MCNC: 7.2 G/DL (ref 6.3–8.2)
PROT UR STRIP-MCNC: ABNORMAL MG/DL
RBC #/AREA URNS HPF: ABNORMAL #/HPF
SODIUM SERPL-SCNC: 143 MMOL/L (ref 137–145)
SP GR UR REFRACTOMETRY: 1.03 (ref 1–1.03)
TRIGL SERPL-MCNC: 119 MG/DL (ref 0–200)
UROBILINOGEN UR QL STRIP.AUTO: NEGATIVE
VLDLC SERPL CALC-MCNC: 24 MG/DL
WBC URNS QL MICRO: ABNORMAL #/HPF

## 2019-02-14 RX ORDER — ASPIRIN 81 MG/1
TABLET ORAL DAILY
COMMUNITY
End: 2021-01-01

## 2019-02-14 RX ORDER — AZITHROMYCIN 250 MG/1
TABLET, FILM COATED ORAL
Qty: 6 TAB | Refills: 0 | Status: SHIPPED | OUTPATIENT
Start: 2019-02-14 | End: 2019-02-19

## 2019-02-14 NOTE — PROGRESS NOTES
Chief Complaint Patient presents with [facility] Annual Wellness Visit  Hypertension  
  follow up  Cholesterol Problem  
  follow up Depression Risk Factor Screening:  
 
3 most recent PHQ Screens 2/14/2019 Little interest or pleasure in doing things Not at all Feeling down, depressed, irritable, or hopeless Not at all Total Score PHQ 2 0 Functional Ability and Level of Safety: Activities of Daily Living ADL Assessment 2/14/2019 Feeding yourself No Help Needed Getting from bed to chair No Help Needed Getting dressed No Help Needed Bathing or showering No Help Needed Walk across the room (includes cane/walker) No Help Needed Using the telphone No Help Needed Taking your medications No Help Needed Preparing meals No Help Needed Managing money (expenses/bills) No Help Needed Moderately strenuous housework (laundry) No Help Needed Shopping for personal items (toiletries/medicines) No Help Needed Shopping for groceries No Help Needed Driving No Help Needed Climbing a flight of stairs No Help Needed Getting to places beyond walking distances No Help Needed Fall Risk Fall Risk Assessment, last 12 mths 2/14/2019 Able to walk? Yes Fall in past 12 months? No  
 
 
Abuse Screen Abuse Screening Questionnaire 2/14/2019 Do you ever feel afraid of your partner? Koreen Petite Are you in a relationship with someone who physically or mentally threatens you? Koreen Petite Is it safe for you to go home? Soheila Cabral Patient Care Team  
Patient Care Team: 
Gil Osuna MD as PCP - General (Internal Medicine) Kendal Jeong MD (Cardiology)

## 2019-02-14 NOTE — PROGRESS NOTES
This is the Subsequent Medicare Annual Wellness Exam, performed 12 months or more after the Initial AWV or the last Subsequent AWV I have reviewed the patient's medical history in detail and updated the computerized patient record. History Past Medical History:  
Diagnosis Date  AK (actinic keratosis) 7/27/2017  Arteriosclerotic coronary artery disease 7/27/2017  Arthritis of right knee 7/27/2017  Back pain 7/27/2017  Carotid atherosclerosis 7/27/2017  Cough 7/27/2017  Fatigue 7/27/2017  History of pneumonia 7/27/2017  
 HTN (hypertension) 7/27/2017  Hyperlipidemia 7/27/2017  Hypertension  Need for Streptococcus pneumoniae vaccination 7/27/2017  On statin therapy 7/27/2017  Plantar fasciitis 7/27/2017  Pneumonia  Shoulder sprain 7/27/2017  Syncope 7/27/2017 Past Surgical History:  
Procedure Laterality Date  HX GI    
 ruptured intestine Current Outpatient Medications Medication Sig Dispense Refill  aspirin delayed-release 81 mg tablet Take  by mouth daily.  valsartan-hydroCHLOROthiazide (DIOVAN-HCT) 160-12.5 mg per tablet TAKE 1 TABLET BY MOUTH ONCE DAILY (Patient taking differently: TAKE 1 TABLET BY MOUTH ONCE DAILY patient only taking 1/2 tablet daily) 90 Tab 6  pravastatin (PRAVACHOL) 40 mg tablet TAKE 1 TABLET BY MOUTH AT BEDTIME  11  
 clopidogrel (PLAVIX) 75 mg tab TAKE 1 TABLET BY MOUTH EVERY DAY  11  
 multivitamins-minerals-lutein (CENTRUM SILVER) Tab Take 1 Tab by mouth daily. No Known Allergies Family History Problem Relation Age of Onset  Hypertension Mother  Heart Disease Father Social History Tobacco Use  Smoking status: Never Smoker  Smokeless tobacco: Never Used Substance Use Topics  Alcohol use: No  
 
Patient Active Problem List  
Diagnosis Code  Pneumonia- lobar J18.9  
 UTI (lower urinary tract infection) N39.0  Hyponatremia E87.1  Hyperglycemia R73.9  ASD (atrial septal defect) VS PFO on echo Q21.1  Pulmonary hypertension, moderate to severe- on echo I27.20  
 Other pulmonary embolism and infarction I26.99  
 SOB (shortness of breath) R06.02  
 AK (actinic keratosis) L57.0  Arthritis of right knee M17.11  
 On statin therapy Z79.899  Arteriosclerotic coronary artery disease I25.10  Hyperlipidemia E78.5  Carotid atherosclerosis I65.29  
 HTN (hypertension) I10  
 History of pneumonia Z87.01  
 Cough R05  Back pain M54.9  Shoulder sprain S43.409A  Plantar fasciitis M72.2  Fatigue R53.83  Syncope R55  Need for Streptococcus pneumoniae vaccination Z23 Depression Risk Factor Screening:  
 
3 most recent PHQ Screens 2/14/2019 Little interest or pleasure in doing things Not at all Feeling down, depressed, irritable, or hopeless Not at all Total Score PHQ 2 0 Alcohol Risk Factor Screening: You do not drink alcohol or very rarely. Functional Ability and Level of Safety:  
Hearing Loss Hearing is good. Activities of Daily Living The home contains: no safety equipment. Patient does total self care Fall Risk Fall Risk Assessment, last 12 mths 2/14/2019 Able to walk? Yes Fall in past 12 months? No  
 
 
Abuse Screen Patient is not abused Cognitive Screening Evaluation of Cognitive Function: 
Has your family/caregiver stated any concerns about your memory: no 
Normal 
 
Patient Care Team  
Patient Care Team: 
Johanne Amaya MD as PCP - General (Internal Medicine) Jose G Gallegos MD (Cardiology) Assessment/Plan Education and counseling provided: 
Are appropriate based on today's review and evaluation Diagnoses and all orders for this visit: 1. Arteriosclerotic coronary artery disease 2. Essential hypertension 
-     URINALYSIS W/O MICRO 
-     METABOLIC PANEL, COMPREHENSIVE 3. On statin therapy 
-     CK 4. Pure hypercholesterolemia -     LIPID PANEL 
 
 5. Right carotid bruit 
-     DUPLEX CAROTID BILATERAL; Future 6. Encounter for immunization 
-     PNEUMOCOCCAL CONJ VACCINE 13 VALENT IM 7. Medicare annual wellness visit, subsequent 8. Screening for alcoholism -     [place] ANNUAL ALCOHOL SCREEN 15 MIN 
 
9. Screening for depression 
-     [facility] 10. Encounter for screening mammogram for malignant neoplasm of breast 
-     BRUCE MAMMO BI SCREENING INCL CAD; Future 11. Disorder of bone and cartilage -     DEXA BONE DENSITY STUDY AXIAL; Future Health Maintenance Due Topic Date Due  
 DTaP/Tdap/Td series (1 - Tdap) 05/17/1953  Shingrix Vaccine Age 50> (1 of 2) 05/17/1982  GLAUCOMA SCREENING Q2Y  05/17/1997  Bone Densitometry (Dexa) Screening  05/17/1997  Pneumococcal 65+ Low/Medium Risk (2 of 2 - PPSV23) 01/01/2015  Influenza Age 5 to Adult  08/01/2018  MEDICARE YEARLY EXAM  01/16/2019 This note will not be viewable in [address] Iris Quinones is a 80 y.o. female and presents with Annual Wellness Visit; Hypertension (follow up); and Cholesterol Problem (follow up) Jordan Diego Subjective: 
Mrs. Verito Cherry presents today for annual wellness visit and review of multiple medical problems including coronary disease, hypertension, hyperlipidemia, monitoring of statin therapy. She has been doing well in general and was without any significant complaints today. She has no shortness of breath, chest pain, palpitations, PND, orthopnea, or pedal edema. She has seen her cardiologist within the past 6 months and no changes were made in her medical regimen. She has not had a mammogram done in several years but after further discussion decided that it would be a good idea to go ahead and have one done as a screening test.  This will be arranged. In addition she has not had a bone density test in several years and a follow-up bone density test will be arranged.   She did receive a flu vaccine at her local pharmacy which will be updated on her record and she will receive a Prevnar 13 vaccine today. Review of Systems Constitutional: negative for fevers, chills, anorexia and weight loss Eyes:   negative for visual disturbance and irritation ENT:   negative for tinnitus,sore throat,nasal congestion,ear pains. hoarseness Respiratory:  negative for cough, hemoptysis, dyspnea,wheezing CV:   negative for chest pain, palpitations, lower extremity edema GI:   negative for nausea, vomiting, diarrhea, abdominal pain,melena Endo:               negative for polyuria,polydipsia,polyphagia,heat intolerance Genitourinary: negative for frequency, dysuria and hematuria Integumentary: negative for rash and pruritus Hematologic:  negative for easy bruising and gum/nose bleeding Musculoskel: negative for myalgias, arthralgias, back pain, muscle weakness, joint pain Neurological:  negative for headaches, dizziness, vertigo, memory problems and gait Behavl/Psych: negative for feelings of anxiety, depression, mood changes Past Medical History:  
Diagnosis Date  AK (actinic keratosis) 7/27/2017  Arteriosclerotic coronary artery disease 7/27/2017  Arthritis of right knee 7/27/2017  Back pain 7/27/2017  Carotid atherosclerosis 7/27/2017  Cough 7/27/2017  Fatigue 7/27/2017  History of pneumonia 7/27/2017  
 HTN (hypertension) 7/27/2017  Hyperlipidemia 7/27/2017  Hypertension  Need for Streptococcus pneumoniae vaccination 7/27/2017  On statin therapy 7/27/2017  Plantar fasciitis 7/27/2017  Pneumonia  Shoulder sprain 7/27/2017  Syncope 7/27/2017 Past Surgical History:  
Procedure Laterality Date  HX GI    
 ruptured intestine Social History Socioeconomic History  Marital status: SINGLE Spouse name: Not on file  Number of children: Not on file  Years of education: Not on file  Highest education level: Not on file Tobacco Use  Smoking status: Never Smoker  Smokeless tobacco: Never Used Substance and Sexual Activity  Alcohol use: No  
 Drug use: No  
 Sexual activity: No  
 
Family History Problem Relation Age of Onset  Hypertension Mother  Heart Disease Father Current Outpatient Medications Medication Sig Dispense Refill  aspirin delayed-release 81 mg tablet Take  by mouth daily.  valsartan-hydroCHLOROthiazide (DIOVAN-HCT) 160-12.5 mg per tablet TAKE 1 TABLET BY MOUTH ONCE DAILY (Patient taking differently: TAKE 1 TABLET BY MOUTH ONCE DAILY patient only taking 1/2 tablet daily) 90 Tab 6  pravastatin (PRAVACHOL) 40 mg tablet TAKE 1 TABLET BY MOUTH AT BEDTIME  11  
 clopidogrel (PLAVIX) 75 mg tab TAKE 1 TABLET BY MOUTH EVERY DAY  11  
 multivitamins-minerals-lutein (CENTRUM SILVER) Tab Take 1 Tab by mouth daily. No Known Allergies Objective: 
Visit Vitals /72 (BP 1 Location: Left arm, BP Patient Position: Sitting) Pulse 60 Temp 97.9 °F (36.6 °C) (Oral) Resp 16 Ht 5' 2\" (1.575 m) Wt 153 lb (69.4 kg) SpO2 98% BMI 27.98 kg/m² Physical Exam:  
General appearance - alert, well appearing, and in no distress Mental status - alert, oriented to person, place, and time EYE-PARESH, EOMI, fundi normal, corneas normal, no foreign bodies ENT-ENT exam normal, no neck nodes or sinus tenderness Nose - normal and patent, no erythema, discharge or polyps Mouth - mucous membranes moist, pharynx normal without lesions Neck -soft right carotid bruit Chest - clear to auscultation, no wheezes, rales or rhonchi, symmetric air entry, mild kyphosis of the thoracic spine Heart - normal rate, regular rhythm, normal S1, S2, no murmurs, rubs, clicks or gallops Abdomen - soft, nontender, nondistended, no masses or organomegaly Lymph- no adenopathy palpable Ext-peripheral pulses normal, no pedal edema, no clubbing or cyanosis Skin-Warm and dry. no hyperpigmentation, vitiligo, or suspicious lesions Neuro -alert, oriented, normal speech, no focal findings or movement disorder noted Musculoskeletal- FROM, no bony abnormalities, no point tenderness Breast -deferred Pelvic -deferred No results found for this visit on 02/14/19. All results for lab orders may not have been returned by the time this encountered was closed. Assessment/Plan: 
 
Orders Placed This Encounter  Depression Screen Annual  
 Bilateral Digital Screening Mammography Standing Status:   Future Standing Expiration Date:   8/13/2019 Order Specific Question:   Reason for Exam  
  Answer:   breast cancer screening  Dexa Bone Density Study Axial ([id number]) Standing Status:   Future Standing Expiration Date:   8/13/2019 Scheduling Instructions:  
   Please schedule at [facility]  
  Order Specific Question:   Reason for Exam  
  Answer:   Screening  PNEUMOCOCCAL CONJ VACCINE 13 VALENT IM (Age 48 and over)  URINALYSIS W/O MICRO ([facility])  CK ([facility])  LIPID PANEL ([facility])  METABOLIC PANEL, COMPREHENSIVE ([facility])  Annual  Alcohol Screen 15 min ()  aspirin delayed-release 81 mg tablet Sig: Take  by mouth daily. Problem List Items Addressed This Visit On statin therapy Relevant Orders CK Arteriosclerotic coronary artery disease Relevant Medications  
 aspirin delayed-release 81 mg tablet Hyperlipidemia Relevant Orders LIPID PANEL  
 HTN (hypertension) Relevant Medications  
 aspirin delayed-release 81 mg tablet Other Relevant Orders URINALYSIS W/O MICRO  
 METABOLIC PANEL, COMPREHENSIVE Other Visit Diagnoses Medicare annual wellness visit, subsequent    -  Primary Right carotid bruit Relevant Orders DUPLEX CAROTID BILATERAL Encounter for immunization Relevant Orders  PNEUMOCOCCAL CONJ VACCINE 13 VALENT IM  
 Screening for alcoholism Relevant Orders [place] ANNUAL ALCOHOL SCREEN 15 MIN Screening for depression Relevant Orders Lisa 68 Encounter for screening mammogram for malignant neoplasm of breast      
 Relevant Orders BRUCE MAMMO BI SCREENING INCL CAD Disorder of bone and cartilage Relevant Orders DEXA BONE DENSITY STUDY AXIAL Patient Instructions Vaccine Information Statement Pneumococcal Conjugate Vaccine (PCV13): What You Need to Know Many Vaccine Information Statements are available in [place]ian and other languages. See www.immunize.org/vis. Hojas de información Sobre Vacunas están disponibles en español y en muchos otros idiomas. Visite www.immunize.org/vis. 1. Why get vaccinated? Vaccination can protect both children and adults from pneumococcal disease. Pneumococcal disease is caused by bacteria that can spread from person to person through close contact. It can cause ear infections, and it can also lead to more serious infections of the: 
 Lungs (pneumonia),  Blood (bacteremia), and 
 Covering of the brain and spinal cord (meningitis). Pneumococcal pneumonia is most common among adults. Pneumococcal meningitis can cause deafness and brain damage, and it kills about 1 child in 10 who get it. Anyone can get pneumococcal disease, but children under 3years of age and adults 72 years and older, people with certain medical conditions, and cigarette smokers are at the highest risk. Before there was a vaccine, the [facility] saw: 
 more than 700 cases of meningitis, 
 about 13,000 blood infections, 
 about 5 million ear infections, and 
 about 200 deaths 
in children under 5 each year from pneumococcal disease. Since vaccine became available, severe pneumococcal disease in these children has fallen by 88%. About 18,000 older adults die of pneumococcal disease each year in the [place]. Treatment of pneumococcal infections with penicillin and other drugs is not as effective as it used to be, because some strains of the disease have become resistant to these drugs. This makes prevention of the disease, through vaccination, even more important. 2. PCV13 vaccine Pneumococcal conjugate vaccine (called PCV13) protects against 13 types of pneumococcal bacteria. PCV13 is routinely given to children at 2, 4, 6, and 1515 months of age. It is also recommended for children and adults 3to 59years of age with certain health conditions, and for all adults 72years of age and older. Your doctor can give you details. 3. Some people should not get this vaccine Anyone who has ever had a life-threatening allergic reaction to a dose of this vaccine, to an earlier pneumococcal vaccine called PCV7, or to any vaccine containing diphtheria toxoid (for example, DTaP), should not get PCV13. Anyone with a severe allergy to any component of PCV13 should not get the vaccine. Tell your doctor if the person being vaccinated has any severe allergies. If the person scheduled for vaccination is not feeling well, your healthcare provider might decide to reschedule the shot on another day. 4. Risks of a vaccine reaction With any medicine, including vaccines, there is a chance of reactions. These are usually mild and go away on their own, but serious reactions are also possible. Problems reported following PCV13 varied by age and dose in the series. The most common problems reported among children were:  About half became drowsy after the shot, had a temporary loss of appetite, or had redness or tenderness where the shot was given.  About 1 out of 3 had swelling where the shot was given.  About 1 out of 3 had a mild fever, and about 1 in 20 had a fever over 102.2°F. 
 Up to about 8 out of 10 became fussy or irritable. Adults have reported pain, redness, and swelling where the shot was given; also mild fever, fatigue, headache, chills, or muscle pain. [facility] children who get PCV13 along with inactivated flu vaccine at the same time may be at increased risk for seizures caused by fever. Ask your doctor for more information. Problems that could happen after any vaccine:  People sometimes faint after a medical procedure, including vaccination. Sitting or lying down for about 15 minutes can help prevent fainting, and injuries caused by a fall. Tell your doctor if you feel dizzy, or have vision changes or ringing in the ears.  Some older children and adults get severe pain in the shoulder and have difficulty moving the arm where a shot was given. This happens very rarely.  Any medication can cause a severe allergic reaction. Such reactions from a vaccine are very rare, estimated at about 1 in a million doses, and would happen within a few minutes to a few hours after the vaccination. As with any medicine, there is a very small chance of a vaccine causing a serious injury or death. The safety of vaccines is always being monitored. For more information, visit: www.cdc.gov/vaccinesafety/  
 
5. What if there is a serious reaction? What should I look for?  Look for anything that concerns you, such as signs of a severe allergic reaction, very high fever, or unusual behavior. Signs of a severe allergic reaction can include hives, swelling of the face and throat, difficulty breathing, a fast heartbeat, dizziness, and weakness  usually within a few minutes to a few hours after the vaccination. What should I do?  If you think it is a severe allergic reaction or other emergency that cant wait, call 9-1-1 or get the person to the nearest hospital. Otherwise, call your doctor.  
 
Reactions should be reported to the Vaccine Adverse Event Reporting System (VAERS). Your doctor should file this report, or you can do it yourself through the VAERS web site at www.vaers. [facility].gov, or by calling 3-111.928.1876. [facility] does not give medical advice. 6. The National Vaccine Injury Compensation Program 
 
The [facility] Meet Vaccine Injury Compensation Program (VICP) is a federal program that was created to compensate people who may have been injured by certain vaccines. Persons who believe they may have been injured by a vaccine can learn about the program and about filing a claim by calling 5-146.474.2257 or visiting the simplifyMD website at www.[facility].gov/vaccinecompensation. There is a time limit to file a claim for compensation. 7. How can I learn more?  Ask your healthcare provider. He or she can give you the vaccine package insert or suggest other sources of information.  Call your local or state health department.  Contact the Centers for Disease Control and Prevention (CDC): 
- Call 5-929.968.7866 (1-800-CDC-INFO) or 
- Visit CDCs website at www.cdc.gov/vaccines Vaccine Information Statement PCV13 Vaccine 11/5/2015  
[address] MINOO Pay[address] [id number]-16 [facility] and [employer] Centers for Disease Control and Prevention Office Use Only Medicare Wellness Visit, Female The best way to live healthy is to have a lifestyle where you eat a well-balanced diet, exercise regularly, limit alcohol use, and quit all forms of tobacco/nicotine, if applicable. Regular preventive services are another way to keep healthy. Preventive services (vaccines, screening tests, monitoring & exams) can help personalize your care plan, which helps you manage your own care. Screening tests can find health problems at the earliest stages, when they are easiest to treat.   
[facility] follows the current, evidence-based guidelines published by the [place] (USPSTF) when recommending preventive services for our patients. Because we follow these guidelines, sometimes recommendations change over time as research supports it. (For example, mammograms used to be recommended annually. Even though Medicare will still pay for an annual mammogram, the newer guidelines recommend a mammogram every two years for women of average risk.) Of course, you and your doctor may decide to screen more often for some diseases, based on your risk and your health status. Preventive services for you include: - Medicare offers their members a free annual wellness visit, which is time for you and your primary care provider to discuss and plan for your preventive service needs. Take advantage of this benefit every year! 
-All adults over the age of 72 should receive the recommended pneumonia vaccines. Current USPSTF guidelines recommend a series of two vaccines for the best pneumonia protection.  
-All adults should have a flu vaccine yearly and a tetanus vaccine every 10 years. All adults age 61 and older should receive a shingles vaccine once in their lifetime.   
-A bone mass density test is recommended when a woman turns 65 to screen for osteoporosis. This test is only recommended one time, as a screening. Some providers will use this same test as a disease monitoring tool if you already have osteoporosis. -All adults age 38-68 who are overweight should have a diabetes screening test once every three years.  
-Other screening tests and preventive services for persons with diabetes include: an eye exam to screen for diabetic retinopathy, a kidney function test, a foot exam, and stricter control over your cholesterol.  
-Cardiovascular screening for adults with routine risk involves an electrocardiogram (ECG) at intervals determined by your doctor.  
-Colorectal cancer screenings should be done for adults age 54-65 with no increased risk factors for colorectal cancer.   There are a number of acceptable methods of screening for this type of cancer. Each test has its own benefits and drawbacks. Discuss with your doctor what is most appropriate for you during your annual wellness visit. The different tests include: colonoscopy (considered the best screening method), a fecal occult blood test, a fecal DNA test, and sigmoidoscopy. -Breast cancer screenings are recommended every other year for women of normal risk, age 54-69. 
-Cervical cancer screenings for women over age 72 are only recommended with certain risk factors.  
-All adults born between [facility] should be screened once for Hepatitis C. Here is a list of your current Health Maintenance items (your personalized list of preventive services) with a due date: 
Health Maintenance Due Topic Date Due  
 DTaP/Tdap/Td  (1 - Tdap) 05/17/1953  Shingles Vaccine (1 of 2) 05/17/1982  Glaucoma Screening   05/17/1997  Bone Mineral Density   05/17/1997  Pneumococcal Vaccine (2 of 2 - PPSV23) 01/01/2015  Flu Vaccine  08/01/2018 [facility] Annual Well Visit  01/16/2019 Follow-up Disposition: 
Return in about 6 months (around 8/14/2019) for follow up. I have reviewed with the patient details of the assessment and plan and all questions were answered. Relevent patient education was performed. The most recent lab findings were reviewed with the patient. An After Visit Summary was printed and given to the patient.  
 
 
Rosina Castle MD

## 2019-02-14 NOTE — PATIENT INSTRUCTIONS
Vaccine Information Statement Pneumococcal Conjugate Vaccine (PCV13): What You Need to Know Many Vaccine Information Statements are available in [place] and other languages. See www.immunize.org/vis. Hojas de información Sobre Vacunas están disponibles en español y en muchos otros idiomas. Visite www.immunize.org/vis. 1. Why get vaccinated? Vaccination can protect both children and adults from pneumococcal disease. Pneumococcal disease is caused by bacteria that can spread from person to person through close contact. It can cause ear infections, and it can also lead to more serious infections of the: 
 Lungs (pneumonia),  Blood (bacteremia), and 
 Covering of the brain and spinal cord (meningitis). Pneumococcal pneumonia is most common among adults. Pneumococcal meningitis can cause deafness and brain damage, and it kills about 1 child in 10 who get it. Anyone can get pneumococcal disease, but children under 3years of age and adults 72 years and older, people with certain medical conditions, and cigarette smokers are at the highest risk. Before there was a vaccine, the [facility] saw: 
 more than 700 cases of meningitis, 
 about 13,000 blood infections, 
 about 5 million ear infections, and 
 about 200 deaths 
in children under 5 each year from pneumococcal disease. Since vaccine became available, severe pneumococcal disease in these children has fallen by 88%. About 18,000 older adults die of pneumococcal disease each year in the [place]. Treatment of pneumococcal infections with penicillin and other drugs is not as effective as it used to be, because some strains of the disease have become resistant to these drugs. This makes prevention of the disease, through vaccination, even more important. 2. PCV13 vaccine Pneumococcal conjugate vaccine (called PCV13) protects against 13 types of pneumococcal bacteria. PCV13 is routinely given to children at 2, 4, 6, and 1515 months of age. It is also recommended for children and adults 3to 59years of age with certain health conditions, and for all adults 72years of age and older. Your doctor can give you details. 3. Some people should not get this vaccine Anyone who has ever had a life-threatening allergic reaction to a dose of this vaccine, to an earlier pneumococcal vaccine called PCV7, or to any vaccine containing diphtheria toxoid (for example, DTaP), should not get PCV13. Anyone with a severe allergy to any component of PCV13 should not get the vaccine. Tell your doctor if the person being vaccinated has any severe allergies. If the person scheduled for vaccination is not feeling well, your healthcare provider might decide to reschedule the shot on another day. 4. Risks of a vaccine reaction With any medicine, including vaccines, there is a chance of reactions. These are usually mild and go away on their own, but serious reactions are also possible. Problems reported following PCV13 varied by age and dose in the series. The most common problems reported among children were:  About half became drowsy after the shot, had a temporary loss of appetite, or had redness or tenderness where the shot was given.  About 1 out of 3 had swelling where the shot was given.  About 1 out of 3 had a mild fever, and about 1 in 20 had a fever over 102.2°F. 
 Up to about 8 out of 10 became fussy or irritable. Adults have reported pain, redness, and swelling where the shot was given; also mild fever, fatigue, headache, chills, or muscle pain. Joechung Clayton children who get PCV13 along with inactivated flu vaccine at the same time may be at increased risk for seizures caused by fever. Ask your doctor for more information. Problems that could happen after any vaccine:  People sometimes faint after a medical procedure, including vaccination. Sitting or lying down for about 15 minutes can help prevent fainting, and injuries caused by a fall. Tell your doctor if you feel dizzy, or have vision changes or ringing in the ears.  Some older children and adults get severe pain in the shoulder and have difficulty moving the arm where a shot was given. This happens very rarely.  Any medication can cause a severe allergic reaction. Such reactions from a vaccine are very rare, estimated at about 1 in a million doses, and would happen within a few minutes to a few hours after the vaccination. As with any medicine, there is a very small chance of a vaccine causing a serious injury or death. The safety of vaccines is always being monitored. For more information, visit: www.cdc.gov/vaccinesafety/  
 
5. What if there is a serious reaction? What should I look for?  Look for anything that concerns you, such as signs of a severe allergic reaction, very high fever, or unusual behavior. Signs of a severe allergic reaction can include hives, swelling of the face and throat, difficulty breathing, a fast heartbeat, dizziness, and weakness  usually within a few minutes to a few hours after the vaccination. What should I do?  If you think it is a severe allergic reaction or other emergency that cant wait, call 9-1-1 or get the person to the nearest hospital. Otherwise, call your doctor. Reactions should be reported to the Vaccine Adverse Event Reporting System (VAERS). Your doctor should file this report, or you can do it yourself through the VAERS web site at www.vaers. hhs.gov, or by calling 5-449.164.8747. VAERS does not give medical advice. 6. The National Vaccine Injury Compensation Program 
 
The [facility] Vaccine Injury Compensation Program (VICP) is a federal program that was created to compensate people who may have been injured by certain vaccines.  
 
Persons who believe they may have been injured by a vaccine can learn about the program and about filing a claim by calling 8-231.879.4247 or visiting the [employer] website at www.[facility].gov/vaccinecompensation. There is a time limit to file a claim for compensation. 7. How can I learn more?  Ask your healthcare provider. He or she can give you the vaccine package insert or suggest other sources of information.  Call your local or state health department.  Contact the Centers for Disease Control and Prevention (CDC): 
- Call 9-971.173.7316 (1-800-CDC-INFO) or 
- Visit CDCs website at www.cdc.gov/vaccines Vaccine Information Statement PCV13 Vaccine 11/5/2015  
42 MINOO Pfeiffer Estimable [id number]-31 [facility] [employer] Centers for Disease Control and Prevention Office Use Only Medicare Wellness Visit, Female The best way to live healthy is to have a lifestyle where you eat a well-balanced diet, exercise regularly, limit alcohol use, and quit all forms of tobacco/nicotine, if applicable. Regular preventive services are another way to keep healthy. Preventive services (vaccines, screening tests, monitoring & exams) can help personalize your care plan, which helps you manage your own care. Screening tests can find health problems at the earliest stages, when they are easiest to treat. [facility] follows the current, evidence-based guidelines published by the [place] (USPSTF) when recommending preventive services for our patients. Because we follow these guidelines, sometimes recommendations change over time as research supports it. (For example, mammograms used to be recommended annually. Even though Medicare will still pay for an annual mammogram, the newer guidelines recommend a mammogram every two years for women of average risk.) Of course, you and your doctor may decide to screen more often for some diseases, based on your risk and your health status. Preventive services for you include: - Medicare offers their members a free annual wellness visit, which is time for you and your primary care provider to discuss and plan for your preventive service needs. Take advantage of this benefit every year! 
-All adults over the age of 72 should receive the recommended pneumonia vaccines. Current USPSTF guidelines recommend a series of two vaccines for the best pneumonia protection.  
-All adults should have a flu vaccine yearly and a tetanus vaccine every 10 years. All adults age 61 and older should receive a shingles vaccine once in their lifetime.   
-A bone mass density test is recommended when a woman turns 65 to screen for osteoporosis. This test is only recommended one time, as a screening. Some providers will use this same test as a disease monitoring tool if you already have osteoporosis. -All adults age 38-68 who are overweight should have a diabetes screening test once every three years.  
-Other screening tests and preventive services for persons with diabetes include: an eye exam to screen for diabetic retinopathy, a kidney function test, a foot exam, and stricter control over your cholesterol.  
-Cardiovascular screening for adults with routine risk involves an electrocardiogram (ECG) at intervals determined by your doctor.  
-Colorectal cancer screenings should be done for adults age 54-65 with no increased risk factors for colorectal cancer. There are a number of acceptable methods of screening for this type of cancer. Each test has its own benefits and drawbacks. Discuss with your doctor what is most appropriate for you during your annual wellness visit. The different tests include: colonoscopy (considered the best screening method), a fecal occult blood test, a fecal DNA test, and sigmoidoscopy. -Breast cancer screenings are recommended every other year for women of normal risk, age 54-69. 
-Cervical cancer screenings for women over age 72 are only recommended with certain risk factors. -All adults born between [facility] should be screened once for Hepatitis C. Here is a list of your current Health Maintenance items (your personalized list of preventive services) with a due date: 
Health Maintenance Due Topic Date Due  
 DTaP/Tdap/Td  (1 - Tdap) 05/17/1953  Shingles Vaccine (1 of 2) 05/17/1982  Glaucoma Screening   05/17/1997  Bone Mineral Density   05/17/1997  Pneumococcal Vaccine (2 of 2 - PPSV23) 01/01/2015  Flu Vaccine  08/01/2018 [facility] Annual Well Visit  01/16/2019

## 2019-02-17 LAB — BACTERIA UR CULT: NORMAL

## 2019-02-26 ENCOUNTER — HOSPITAL ENCOUNTER (OUTPATIENT)
Dept: VASCULAR SURGERY | Age: 84
Discharge: HOME | End: 2019-02-26
Attending: INTERNAL MEDICINE
Payer: MEDICARE

## 2019-02-26 ENCOUNTER — HOSPITAL ENCOUNTER (OUTPATIENT)
Dept: MAMMOGRAPHY | Age: 84
Discharge: HOME | End: 2019-02-26
Attending: INTERNAL MEDICINE
Payer: MEDICARE

## 2019-02-26 DIAGNOSIS — Z12.31 ENCOUNTER FOR SCREENING MAMMOGRAM FOR MALIGNANT NEOPLASM OF BREAST: ICD-10-CM

## 2019-02-26 DIAGNOSIS — R09.89 RIGHT CAROTID BRUIT: ICD-10-CM

## 2019-02-26 LAB
LEFT CCA DIST DIAS: 0 CM/S
LEFT CCA DIST SYS: 84 CM/S
LEFT CCA PROX DIAS: 0 CM/S
LEFT CCA PROX SYS: 42.6 CM/S
LEFT ECA DIAS: 0 CM/S
LEFT ECA SYS: 166.6 CM/S
LEFT ICA DIST DIAS: 0 CM/S
LEFT ICA DIST SYS: 0 CM/S
LEFT ICA MID DIAS: 0 CM/S
LEFT ICA MID SYS: 0 CM/S
LEFT ICA PROX DIAS: 0 CM/S
LEFT ICA PROX SYS: 38.6 CM/S
LEFT ICA/CCA SYS: 0.5
LEFT SUBCLAVIAN DIAS: 0 CM/S
LEFT SUBCLAVIAN SYS: 99.7 CM/S
LEFT VERTEBRAL DIAS: 0 CM/S
LEFT VERTEBRAL SYS: 48.1 CM/S
RIGHT CCA DIST DIAS: 8.6 CM/S
RIGHT CCA DIST SYS: 55.7 CM/S
RIGHT CCA PROX DIAS: 4.7 CM/S
RIGHT CCA PROX SYS: 44.3 CM/S
RIGHT ECA DIAS: 0 CM/S
RIGHT ECA SYS: 164.5 CM/S
RIGHT ICA DIST DIAS: 15 CM/S
RIGHT ICA DIST SYS: 73.9 CM/S
RIGHT ICA MID DIAS: 35 CM/S
RIGHT ICA MID SYS: 125.6 CM/S
RIGHT ICA PROX DIAS: 16.8 CM/S
RIGHT ICA PROX SYS: 81.6 CM/S
RIGHT ICA/CCA SYS: 2.3
RIGHT SUBCLAVIAN DIAS: 0 CM/S
RIGHT SUBCLAVIAN SYS: 135.2 CM/S
RIGHT VERTEBRAL DIAS: 22 CM/S
RIGHT VERTEBRAL SYS: 136 CM/S

## 2019-02-26 PROCEDURE — 77067 SCR MAMMO BI INCL CAD: CPT

## 2019-02-26 PROCEDURE — 93880 EXTRACRANIAL BILAT STUDY: CPT

## 2019-02-28 DIAGNOSIS — I65.22 STENOSIS OF LEFT CAROTID ARTERY: Primary | ICD-10-CM

## 2019-08-26 ENCOUNTER — OFFICE VISIT (OUTPATIENT)
Dept: INTERNAL MEDICINE CLINIC | Age: 84
End: 2019-08-26

## 2019-08-26 VITALS
SYSTOLIC BLOOD PRESSURE: 130 MMHG | OXYGEN SATURATION: 97 % | WEIGHT: 148.8 LBS | HEART RATE: 61 BPM | DIASTOLIC BLOOD PRESSURE: 72 MMHG | BODY MASS INDEX: 27.38 KG/M2 | TEMPERATURE: 97.6 F | HEIGHT: 62 IN

## 2019-08-26 DIAGNOSIS — I25.10 ARTERIOSCLEROTIC CORONARY ARTERY DISEASE: Primary | ICD-10-CM

## 2019-08-26 DIAGNOSIS — Z79.899 ON STATIN THERAPY: ICD-10-CM

## 2019-08-26 DIAGNOSIS — N39.0 URINARY TRACT INFECTION WITHOUT HEMATURIA, SITE UNSPECIFIED: ICD-10-CM

## 2019-08-26 DIAGNOSIS — E78.00 PURE HYPERCHOLESTEROLEMIA: ICD-10-CM

## 2019-08-26 DIAGNOSIS — I10 ESSENTIAL HYPERTENSION: ICD-10-CM

## 2019-08-26 LAB
A-G RATIO,AGRAT: 1.5 RATIO
ALBUMIN SERPL-MCNC: 4.5 G/DL (ref 3.9–5.4)
ALP SERPL-CCNC: 88 U/L (ref 38–126)
ALT SERPL-CCNC: 16 U/L (ref 0–35)
ANION GAP SERPL CALC-SCNC: 12 MMOL/L
AST SERPL W P-5'-P-CCNC: 25 U/L (ref 14–36)
BACTERIA,BACTU: ABNORMAL
BILIRUB SERPL-MCNC: 0.6 MG/DL (ref 0.2–1.3)
BILIRUB UR QL: NEGATIVE
BUN SERPL-MCNC: 19 MG/DL (ref 7–17)
BUN/CREATININE RATIO,BUCR: 27 RATIO
CALCIUM SERPL-MCNC: 9.6 MG/DL (ref 8.4–10.2)
CHLORIDE SERPL-SCNC: 99 MMOL/L (ref 98–107)
CHOL/HDL RATIO,CHHD: 3 RATIO (ref 0–4)
CHOLEST SERPL-MCNC: 178 MG/DL (ref 0–200)
CK SERPL-CCNC: 30 U/L (ref 30–135)
CLARITY: CLEAR
CO2 SERPL-SCNC: 30 MMOL/L (ref 22–32)
COLOR UR: ABNORMAL
CREAT SERPL-MCNC: 0.7 MG/DL (ref 0.7–1.2)
GLOBULIN,GLOB: 3
GLUCOSE 24H UR-MRATE: NEGATIVE G/(24.H)
GLUCOSE SERPL-MCNC: 118 MG/DL (ref 65–105)
HDLC SERPL-MCNC: 69 MG/DL (ref 35–130)
HGB UR QL STRIP: NEGATIVE
KETONES UR QL STRIP.AUTO: NEGATIVE
LDL/HDL RATIO,LDHD: 1 RATIO
LDLC SERPL CALC-MCNC: 87 MG/DL (ref 0–130)
LEUKOCYTE ESTERASE: ABNORMAL
NITRITE UR QL STRIP.AUTO: NEGATIVE
PH UR STRIP: 7 [PH] (ref 5–7)
POTASSIUM SERPL-SCNC: 4.3 MMOL/L (ref 3.6–5)
PROT SERPL-MCNC: 7.5 G/DL (ref 6.3–8.2)
PROT UR STRIP-MCNC: NEGATIVE MG/DL
RBC #/AREA URNS HPF: 0 #/HPF
SODIUM SERPL-SCNC: 141 MMOL/L (ref 137–145)
SP GR UR REFRACTOMETRY: 1.01 (ref 1–1.03)
SQUAMOUS EPITHELIAL CELLS: ABNORMAL
TRIGL SERPL-MCNC: 110 MG/DL (ref 0–200)
UROBILINOGEN UR QL STRIP.AUTO: NEGATIVE
VLDLC SERPL CALC-MCNC: 22 MG/DL
WBC URNS QL MICRO: ABNORMAL #/HPF

## 2019-08-26 NOTE — PROGRESS NOTES
This note will not be viewable in [address] Felicitas Mckenzie is a 80 y.o. female and presents with Coronary Artery Disease; Hypertension; and Cholesterol Problem  . Subjective:  Mrs. Anai Moon presents today for follow-up of coronary disease, hypertension, lipedema. She is doing well on her current medical regimen. She denies any side effects from medication. She has had no chest pain, shortness breath, palpitations, PND, orthopnea, or pedal edema. Review of Systems  Constitutional:   Eyes:   negative for visual disturbance and irritation  ENT:   negative for tinnitus,sore throat,nasal congestion,ear pains. hoarseness  Respiratory:  negative for cough, hemoptysis, dyspnea,wheezing  CV:   negative for chest pain, palpitations, lower extremity edema  GI:   negative for nausea, vomiting, diarrhea, abdominal pain,melena  Endo:               negative for polyuria,polydipsia,polyphagia,heat intolerance  Genitourinary: negative for frequency, dysuria and hematuria  Integumentary: negative for rash and pruritus  Hematologic:  negative for easy bruising and gum/nose bleeding  Musculoskel: negative for myalgias, arthralgias, back pain, muscle weakness, joint pain  Neurological:  negative for headaches, dizziness, vertigo, memory problems and gait   Behavl/Psych: negative for feelings of anxiety, depression, mood changes    Past Medical History:   Diagnosis Date    AK (actinic keratosis) 7/27/2017    Arteriosclerotic coronary artery disease 7/27/2017    Arthritis of right knee 7/27/2017    Back pain 7/27/2017    Carotid atherosclerosis 7/27/2017    Cough 7/27/2017    Fatigue 7/27/2017    History of pneumonia 7/27/2017    HTN (hypertension) 7/27/2017    Hyperlipidemia 7/27/2017    Hypertension     Need for Streptococcus pneumoniae vaccination 7/27/2017    On statin therapy 7/27/2017    Plantar fasciitis 7/27/2017    Pneumonia     Shoulder sprain 7/27/2017    Syncope 7/27/2017     Past Surgical History:   Procedure Laterality Date    HX GI      ruptured intestine     Social History     Socioeconomic History    Marital status: SINGLE     Spouse name: Not on file    Number of children: Not on file    Years of education: Not on file    Highest education level: Not on file   Tobacco Use    Smoking status: Never Smoker    Smokeless tobacco: Never Used   Substance and Sexual Activity    Alcohol use: No    Drug use: No    Sexual activity: Never     Family History   Problem Relation Age of Onset    Hypertension Mother     Heart Disease Father      Current Outpatient Medications   Medication Sig Dispense Refill    aspirin delayed-release 81 mg tablet Take  by mouth daily.  clopidogrel (PLAVIX) 75 mg tab TAKE 1 TABLET BY MOUTH EVERY DAY  11    multivitamins-minerals-lutein (CENTRUM SILVER) Tab Take 1 Tab by mouth daily.         valsartan-hydroCHLOROthiazide (DIOVAN-HCT) 160-12.5 mg per tablet TAKE 1 TABLET BY MOUTH ONCE DAILY (Patient taking differently: TAKE 1 TABLET BY MOUTH ONCE DAILY patient only taking 1/2 tablet daily) 90 Tab 6    pravastatin (PRAVACHOL) 40 mg tablet TAKE 1 TABLET BY MOUTH AT BEDTIME  11     No Known Allergies    Objective:  Visit Vitals  /72 (BP 1 Location: Left arm, BP Patient Position: Sitting)   Pulse 61   Temp 97.6 °F (36.4 °C) (Oral)   Ht 5' 2\" (1.575 m)   Wt 148 lb 12.8 oz (67.5 kg)   SpO2 97%   BMI 27.22 kg/m²     Physical Exam:   General appearance - alert, well appearing, and in no distress  Mental status - alert, oriented to person, place, and time  EYE-PARESH, EOMI, fundi normal, corneas normal, no foreign bodies  ENT-ENT exam normal, no neck nodes or sinus tenderness  Nose - normal and patent, no erythema, discharge or polyps  Mouth - mucous membranes moist, pharynx normal without lesions  Neck - supple, no significant adenopathy   Chest - clear to auscultation, no wheezes, rales or rhonchi, symmetric air entry   Heart - normal rate, regular rhythm, normal S1, S2, no murmurs, rubs, clicks or gallops   Abdomen - soft, nontender, nondistended, no masses or organomegaly  Lymph- no adenopathy palpable  Ext-peripheral pulses normal, no pedal edema, no clubbing or cyanosis  Skin-Warm and dry. no hyperpigmentation, vitiligo, or suspicious lesions  Neuro -alert, oriented, normal speech, no focal findings or movement disorder noted  Musculoskeletal- FROM, no bony abnormalities, no point tenderness    No results found for this visit on 08/26/19. All results for lab orders may not have been returned by the time this encountered was closed. Assessment/Plan:       ICD-10-CM ICD-9-CM    1. Arteriosclerotic coronary artery disease I25.10 414.00 CK      LIPID PANEL      METABOLIC PANEL, COMPREHENSIVE      URINALYSIS W/O MICRO   2. Essential hypertension C58 397.4 METABOLIC PANEL, COMPREHENSIVE      URINALYSIS W/O MICRO   3. On statin therapy Z79.899 V58.69 CK   4. Pure hypercholesterolemia E78.00 272.0 CK      LIPID PANEL       Orders Placed This Encounter    CK ([facility])    LIPID PANEL ([facility])    METABOLIC PANEL, COMPREHENSIVE ([facility])    URINALYSIS W/O MICRO ([facility])       Plan:    Continue current medical regimen as outlined above. Further recommendations based on labs as ordered. Return in 6 months unless otherwise indicated. I have reviewed with the patient details of the assessment and plan and all questions were answered. Relevent patient education was performed. Verbal and/or written instructions (see AVS) provided. The most recent lab findings were reviewed with the patient. Plan was discussed with patient who verbal expressed understanding. An After Visit Summary was printed and given to the patient.       Rafa Villar MD

## 2019-08-26 NOTE — PROGRESS NOTES
Reviewed record in preparation for visit and have obtained necessary documentation. Identified pt with two pt identifiers(name and ). Chief Complaint   Patient presents with    Coronary Artery Disease    Hypertension    Cholesterol Problem        Coordination of Care Questionnaire:  :     1) Have you been to an emergency room, urgent care clinic since your last visit? No     Hospitalized since your last visit? No               2) Have you seen or consulted any other health care providers outside of [address] [place] [address] since your last visit?  No

## 2019-08-28 LAB — BACTERIA UR CULT: NORMAL

## 2019-09-25 PROBLEM — Z23 NEED FOR STREPTOCOCCUS PNEUMONIAE VACCINATION: Status: RESOLVED | Noted: 2017-07-27 | Resolved: 2019-09-25

## 2019-11-07 ENCOUNTER — OFFICE VISIT (OUTPATIENT)
Dept: INTERNAL MEDICINE CLINIC | Age: 84
End: 2019-11-07

## 2019-11-07 VITALS
SYSTOLIC BLOOD PRESSURE: 122 MMHG | OXYGEN SATURATION: 98 % | DIASTOLIC BLOOD PRESSURE: 66 MMHG | BODY MASS INDEX: 27.53 KG/M2 | RESPIRATION RATE: 16 BRPM | HEIGHT: 62 IN | WEIGHT: 149.6 LBS | HEART RATE: 66 BPM

## 2019-11-07 DIAGNOSIS — L82.1 SEBORRHEIC KERATOSIS: Primary | ICD-10-CM

## 2019-11-07 NOTE — PROGRESS NOTES
Christopher Ward is a 80 y.o. female presenting for Insect Bite (back) [facility] 1. Have you been to the ER, urgent care clinic since your last visit? Hospitalized since your last visit? No 
 
2. Have you seen or consulted any other health care providers outside of the [address] [place] [address] since your last visit? Include any pap smears or colon screening. No 
 
Fall Risk Assessment, last 12 mths 2/14/2019 Able to walk? Yes Fall in past 12 months? No  
 
 
 
Abuse Screening Questionnaire 2/14/2019 Do you ever feel afraid of your partner? Glenna Garnica Are you in a relationship with someone who physically or mentally threatens you? Glenna Garnica Is it safe for you to go home? Y  
 
 
3 most recent PHQ Screens 2/14/2019 Little interest or pleasure in doing things Not at all Feeling down, depressed, irritable, or hopeless Not at all Total Score PHQ 2 0 There are no discontinued medications.

## 2019-11-07 NOTE — PROGRESS NOTES
This note will not be viewable in [address] Subjective:  
 
Mrs. Dhara Robins is seen today with complaints of an irritation on the right posterior mid back region. She feels as though she has been stung by a bee. She notes irritation and itching in this area. She has been unable to directly look at it or treated there. Past Medical History:  
Diagnosis Date  AK (actinic keratosis) 7/27/2017  Arteriosclerotic coronary artery disease 7/27/2017  Arthritis of right knee 7/27/2017  Back pain 7/27/2017  Carotid atherosclerosis 7/27/2017  Cough 7/27/2017  Fatigue 7/27/2017  History of pneumonia 7/27/2017  
 HTN (hypertension) 7/27/2017  Hyperlipidemia 7/27/2017  Hypertension  Need for Streptococcus pneumoniae vaccination 7/27/2017  On statin therapy 7/27/2017  Plantar fasciitis 7/27/2017  Pneumonia  Shoulder sprain 7/27/2017  Syncope 7/27/2017 Past Surgical History:  
Procedure Laterality Date  HX GI    
 ruptured intestine No Known Allergies Current Outpatient Medications Medication Sig Dispense Refill  valsartan-hydroCHLOROthiazide (DIOVAN-HCT) 160-12.5 mg per tablet TAKE 1 TABLET BY MOUTH EVERY DAY 90 Tab 0  
 aspirin delayed-release 81 mg tablet Take  by mouth daily.  pravastatin (PRAVACHOL) 40 mg tablet TAKE 1 TABLET BY MOUTH AT BEDTIME  11  
 clopidogrel (PLAVIX) 75 mg tab TAKE 1 TABLET BY MOUTH EVERY DAY  11  
 multivitamins-minerals-lutein (CENTRUM SILVER) Tab Take 1 Tab by mouth daily. Social History Socioeconomic History  Marital status: SINGLE Spouse name: Not on file  Number of children: Not on file  Years of education: Not on file  Highest education level: Not on file Tobacco Use  Smoking status: Never Smoker  Smokeless tobacco: Never Used Substance and Sexual Activity  Alcohol use: No  
 Drug use: No  
 Sexual activity: Never Family History Problem Relation Age of Onset  Hypertension Mother  Heart Disease Father Review of Systems: 
GEN: no weight loss, weight gain, fatigue or night sweats CV: no PND, orthopnea, or palpitations Resp: no dyspnea on exertion, no cough Derm: Positive for right posterior back stinging Neurological ROS: no TIA or stroke symptoms ROS otherwise negative Objective:  
 
Visit Vitals /66 (BP 1 Location: Right arm, BP Patient Position: Sitting) Pulse 66 Resp 16 Ht 5' 2\" (1.575 m) Wt 149 lb 9.6 oz (67.9 kg) SpO2 98% BMI 27.36 kg/m² Body mass index is 27.36 kg/m². General:   alert, cooperative and no distress Derm:  There is a 1 cm seborrheic keratosis along her right lateral back which is cracked and irritated. There is no surrounding cellulitis Physical exam otherwise negative Assessment/Plan:  
 
Diagnoses and all orders for this visit: 
 
Seborrheic keratosis Other instructions: The patient's medications were reviewed and reconciled. Bacitracin ointment was applied to the seborrheic keratosis and it was covered with a Band-Aid. I have recommended similar treatment at home until the irritation resolves. Follow-up with Dr. Carrie Montano should she have any further problems Follow-up and Dispositions · Return if symptoms worsen or fail to improve.  
  
 
 
Lukasz Kong MD

## 2019-11-07 NOTE — PATIENT INSTRUCTIONS
Seborrheic Keratosis: Care Instructions Your Care Instructions Seborrheic keratoses are raised skin growths that look scaly or warty. They usually look like they were stuck onto the skin. They most often grow in groups on the back or chest and are more common in older people. A seborrheic keratosis can be tan or dark brown. A seborrheic keratosis is not a mole and is almost always harmless. But it is still a good idea to check your skin regularly. Sometimes a seborrheic keratosis can itch. Scratching it can cause it to bleed and sometimes even scar. A seborrheic keratosis is removed only if it bothers you. The doctor will freeze it or scrape it off with a tool. The doctor can also use a laser to remove a seborrheic keratosis. Treatment usually results in normal-looking skin, but it can leave a light or dark jaz or even a scar on the skin. Follow-up care is a key part of your treatment and safety. Be sure to make and go to all appointments, and call your doctor if you are having problems. It's also a good idea to know your test results and keep a list of the medicines you take. How can you care for yourself at home? · If clothing irritates your seborrheic keratosis, cover it with a bandage to prevent rubbing and bleeding. · If you have a seborrheic keratosis removed, clean the area with soap and water two times a day unless your doctor gives you different instructions. Don't use hydrogen peroxide or alcohol, which can slow healing. ? You may cover the wound with a thin layer of petroleum jelly, such as Vaseline, and a nonstick bandage. · Check all the skin on your body once a month for skin growths or other changes, such as color and feel of the skin. ?  front of a full-length mirror. Look carefully at the front and back of your body. Then look at your right and left sides with your arms raised. ?  Bend your elbows and look carefully at your forearms, the back of your upper arms, and your palms. ? Look at your feet, the soles of your feet, and the spaces between your toes. ? Use a hand mirror to look at the back of your legs, the back of your neck, and your back, rear end (buttocks), and genital area. Part the hair on your head to look at your scalp. · If you see a change in a skin growth, contact your doctor. Look for: ? A mole that bleeds. ? A fast-growing mole. ? A scaly or crusted growth on the skin. ? A sore that will not heal. 
When should you call for help? Call your doctor now or seek immediate medical care if: 
  · You have an area of normal skin that suddenly changes in shape, size, or how it looks.  
  · Your skin is badly broken from scratching.  
  · You have signs of infection such as: 
? Pain, warmth, or swelling in your skin. ? Red streaks near a wound in your skin. ? Pus coming from a wound in your skin. ? A fever not due to the flu or other illness.  
 Watch closely for changes in your health, and be sure to contact your doctor if: 
  · You do not get better as expected. Where can you learn more? Go to http://richard-reum.info/. Enter Q174 in the search box to learn more about \"Seborrheic Keratosis: Care Instructions. \" Current as of: April 1, 2019 Content Version: 12.2 © 2553-1677 [employer]. Care instructions adapted under license by [employer] (which disclaims liability or warranty for this information). If you have questions about a medical condition or this instruction, always ask your healthcare professional. [address] any warranty or liability for your use of this information.

## 2019-12-11 ENCOUNTER — OFFICE VISIT (OUTPATIENT)
Dept: INTERNAL MEDICINE CLINIC | Age: 84
End: 2019-12-11

## 2019-12-11 VITALS
HEIGHT: 62 IN | HEART RATE: 73 BPM | OXYGEN SATURATION: 98 % | DIASTOLIC BLOOD PRESSURE: 64 MMHG | BODY MASS INDEX: 27 KG/M2 | SYSTOLIC BLOOD PRESSURE: 128 MMHG | TEMPERATURE: 98.9 F | RESPIRATION RATE: 14 BRPM | WEIGHT: 146.7 LBS

## 2019-12-11 DIAGNOSIS — J01.90 ACUTE SINUSITIS, RECURRENCE NOT SPECIFIED, UNSPECIFIED LOCATION: Primary | ICD-10-CM

## 2019-12-11 RX ORDER — PREDNISONE 10 MG/1
TABLET ORAL
Qty: 21 TAB | Refills: 0 | Status: SHIPPED | OUTPATIENT
Start: 2019-12-11 | End: 2020-08-17 | Stop reason: ALTCHOICE

## 2019-12-11 RX ORDER — CEFUROXIME AXETIL 500 MG/1
500 TABLET ORAL 2 TIMES DAILY
Qty: 14 TAB | Refills: 0 | Status: SHIPPED | OUTPATIENT
Start: 2019-12-11 | End: 2020-08-17 | Stop reason: ALTCHOICE

## 2019-12-11 NOTE — PROGRESS NOTES
Subjective:  Ms. Lubna Fabian is a pleasant 80year old lady who comes in today for evaluation of a four to five day history of some nasal congestion, yellowish nasal drainage and pressure over her sinuses. Along with that she has had a nonproductive cough. She denies any shortness of breath, but has had a little bit of wheezing. She denies any hemoptysis. Denies chills or fever. She had one episode of vomiting early this morning, but none since then. Past Medical History:   Diagnosis Date    AK (actinic keratosis) 7/27/2017    Arteriosclerotic coronary artery disease 7/27/2017    Arthritis of right knee 7/27/2017    Back pain 7/27/2017    Carotid atherosclerosis 7/27/2017    Cough 7/27/2017    Fatigue 7/27/2017    History of pneumonia 7/27/2017    HTN (hypertension) 7/27/2017    Hyperlipidemia 7/27/2017    Hypertension     Need for Streptococcus pneumoniae vaccination 7/27/2017    On statin therapy 7/27/2017    Plantar fasciitis 7/27/2017    Pneumonia     Shoulder sprain 7/27/2017    Syncope 7/27/2017     Past Surgical History:   Procedure Laterality Date    HX GI      ruptured intestine       Current Outpatient Medications on File Prior to Visit   Medication Sig Dispense Refill    valsartan-hydroCHLOROthiazide (DIOVAN-HCT) 160-12.5 mg per tablet TAKE 1 TABLET BY MOUTH EVERY DAY 90 Tab 0    aspirin delayed-release 81 mg tablet Take  by mouth daily.  pravastatin (PRAVACHOL) 40 mg tablet TAKE 1 TABLET BY MOUTH AT BEDTIME  11    clopidogrel (PLAVIX) 75 mg tab TAKE 1 TABLET BY MOUTH EVERY DAY  11    multivitamins-minerals-lutein (CENTRUM SILVER) Tab Take 1 Tab by mouth daily. No current facility-administered medications on file prior to visit. No Known Allergies  Physical Examination:  GENERAL:  Pleasant, elderly lady in no acute distress. She is alert and oriented. She answers my questions appropriately. VITALS:  Blood pressure:  128/64 . Pulse:  73.  Respirations:  14. Temperature:  98.1. O2 sat:  98. HEENT:  Normocephalic, atraumatic. TMs normal.  Mouth mucosa pink. Tongue midline. Pharynx mildly erythematous without presence of exudates. She is mildly tender over her sinuses. NECK:  Supple without adenopathy. CHEST:  Lungs clear to auscultation, no rales or wheezes. CV:  Heart regular rhythm without murmur. EXTREMITIES:  No edema or calf tenderness. Distal pulses were present. Impression:  1. Acute sinusitis. Plan:  1. It was opted to start her on Ceftin 500 mg twice daily for seven days, along with a prednisone pack. 2. She is to increase fluids and rest.  3. She certainly will contact us should she fail to improve or if her condition worsens.

## 2019-12-11 NOTE — PROGRESS NOTES
Noemi Goldberg is a 80 y.o. female     Chief Complaint   Patient presents with    Cold Symptoms     cough and congestion       Visit Vitals  /64 (BP 1 Location: Left arm, BP Patient Position: Sitting)   Pulse 73   Temp 98.9 °F (37.2 °C) (Oral)   Resp 14   Ht 5' 2\" (1.575 m)   Wt 146 lb 11.2 oz (66.5 kg)   SpO2 98%   BMI 26.83 kg/m²       Health Maintenance Due   Topic Date Due    DTaP/Tdap/Td series (1 - Tdap) 05/17/1943    Shingrix Vaccine Age 50> (1 of 2) 05/17/1982    GLAUCOMA SCREENING Q2Y  05/17/1997    Bone Densitometry (Dexa) Screening  05/17/1997       1. Have you been to the ER, urgent care clinic since your last visit? Hospitalized since your last visit? No    2. Have you seen or consulted any other health care providers outside of the [address] [place] [address] since your last visit? Include any pap smears or colon screening.  No

## 2019-12-11 NOTE — PATIENT INSTRUCTIONS
Sinusitis: Care Instructions  Your Care Instructions    Sinusitis is an infection of the lining of the sinus cavities in your head. Sinusitis often follows a cold. It causes pain and pressure in your head and face. In most cases, sinusitis gets better on its own in 1 to 2 weeks. But some mild symptoms may last for several weeks. Sometimes antibiotics are needed. Follow-up care is a key part of your treatment and safety. Be sure to make and go to all appointments, and call your doctor if you are having problems. It's also a good idea to know your test results and keep a list of the medicines you take. How can you care for yourself at home? · Take an over-the-counter pain medicine, such as acetaminophen (Tylenol), ibuprofen (Advil, Motrin), or naproxen (Aleve). Read and follow all instructions on the label. · If the doctor prescribed antibiotics, take them as directed. Do not stop taking them just because you feel better. You need to take the full course of antibiotics. · Be careful when taking over-the-counter cold or flu medicines and Tylenol at the same time. Many of these medicines have acetaminophen, which is Tylenol. Read the labels to make sure that you are not taking more than the recommended dose. Too much acetaminophen (Tylenol) can be harmful. · Breathe warm, moist air from a steamy shower, a hot bath, or a sink filled with hot water. Avoid cold, dry air. Using a humidifier in your home may help. Follow the directions for cleaning the machine. · Use saline (saltwater) nasal washes to help keep your nasal passages open and wash out mucus and bacteria. You can buy saline nose drops at a grocery store or drugstore. Or you can make your own at home by adding 1 teaspoon of salt and 1 teaspoon of baking soda to 2 cups of distilled water. If you make your own, fill a bulb syringe with the solution, insert the tip into your nostril, and squeeze gently. Amy Outhouse your nose.   · Put a hot, wet towel or a warm gel pack on your face 3 or 4 times a day for 5 to 10 minutes each time. · Try a decongestant nasal spray like oxymetazoline (Afrin). Do not use it for more than 3 days in a row. Using it for more than 3 days can make your congestion worse. When should you call for help? Call your doctor now or seek immediate medical care if:    · You have new or worse swelling or redness in your face or around your eyes.     · You have a new or higher fever.    Watch closely for changes in your health, and be sure to contact your doctor if:    · You have new or worse facial pain.     · The mucus from your nose becomes thicker (like pus) or has new blood in it.     · You are not getting better as expected. Where can you learn more? Go to http://richard-erum.info/. Enter Z554 in the search box to learn more about \"Sinusitis: Care Instructions. \"  Current as of: October 21, 2018  Content Version: 12.2  © 8091-7750 [employer]. Care instructions adapted under license by [employer] (which disclaims liability or warranty for this information). If you have questions about a medical condition or this instruction, always ask your healthcare professional. [place] [address] any warranty or liability for your use of this information.

## 2020-02-28 ENCOUNTER — OFFICE VISIT (OUTPATIENT)
Dept: INTERNAL MEDICINE CLINIC | Age: 85
End: 2020-02-28

## 2020-02-28 VITALS
BODY MASS INDEX: 26.94 KG/M2 | OXYGEN SATURATION: 97 % | TEMPERATURE: 98.6 F | HEART RATE: 69 BPM | RESPIRATION RATE: 16 BRPM | HEIGHT: 62 IN | WEIGHT: 146.4 LBS | DIASTOLIC BLOOD PRESSURE: 62 MMHG | SYSTOLIC BLOOD PRESSURE: 128 MMHG

## 2020-02-28 DIAGNOSIS — E78.00 PURE HYPERCHOLESTEROLEMIA: ICD-10-CM

## 2020-02-28 DIAGNOSIS — I10 ESSENTIAL HYPERTENSION: Primary | ICD-10-CM

## 2020-02-28 DIAGNOSIS — Z79.899 ON STATIN THERAPY: ICD-10-CM

## 2020-02-28 LAB
A-G RATIO,AGRAT: 1.6 RATIO
ALBUMIN SERPL-MCNC: 4.4 G/DL (ref 3.9–5.4)
ALP SERPL-CCNC: 70 U/L (ref 38–126)
ALT SERPL-CCNC: 12 U/L (ref 0–35)
ANION GAP SERPL CALC-SCNC: 7 MMOL/L
AST SERPL W P-5'-P-CCNC: 23 U/L (ref 14–36)
BILIRUB SERPL-MCNC: 0.3 MG/DL (ref 0.2–1.3)
BUN SERPL-MCNC: 23 MG/DL (ref 7–17)
BUN/CREATININE RATIO,BUCR: 33 RATIO
CALCIUM SERPL-MCNC: 9.7 MG/DL (ref 8.4–10.2)
CHLORIDE SERPL-SCNC: 101 MMOL/L (ref 98–107)
CK SERPL-CCNC: 33 U/L (ref 30–135)
CO2 SERPL-SCNC: 29 MMOL/L (ref 22–32)
CREAT SERPL-MCNC: 0.7 MG/DL (ref 0.7–1.2)
GLOBULIN,GLOB: 2.8
GLUCOSE SERPL-MCNC: 146 MG/DL (ref 65–105)
POTASSIUM SERPL-SCNC: 4.8 MMOL/L (ref 3.6–5)
PROT SERPL-MCNC: 7.2 G/DL (ref 6.3–8.2)
SODIUM SERPL-SCNC: 137 MMOL/L (ref 137–145)

## 2020-02-28 NOTE — PROGRESS NOTES
This note will not be viewable in [address] Parmjit Ortiz is a 80 y.o. female and presents with Hypertension (6 month follow up) and Cholesterol Problem (6 month follow up) Raheem Talavera Subjective: 
 
Mrs. Kunal Ocasio presents today for follow-up of hypertension, hyperlipidemia, and monitoring statin therapy. She is doing well on her current medical regimen. She denies any side effects. She has no shortness of breath, chest pain, palpitations, PND, orthopnea, or pedal edema. Review of Systems Constitutional:  
Eyes:   negative for visual disturbance and irritation ENT:   negative for tinnitus,sore throat,nasal congestion,ear pains. hoarseness Respiratory:  negative for cough, hemoptysis, dyspnea,wheezing CV:   negative for chest pain, palpitations, lower extremity edema GI:   negative for nausea, vomiting, diarrhea, abdominal pain,melena Endo:               negative for polyuria,polydipsia,polyphagia,heat intolerance Genitourinary: negative for frequency, dysuria and hematuria Integumentary: negative for rash and pruritus Hematologic:  negative for easy bruising and gum/nose bleeding Musculoskel: negative for myalgias, arthralgias, back pain, muscle weakness, joint pain Neurological:  negative for headaches, dizziness, vertigo, memory problems and gait Behavl/Psych: negative for feelings of anxiety, depression, mood changes Past Medical History:  
Diagnosis Date  AK (actinic keratosis) 7/27/2017  Arteriosclerotic coronary artery disease 7/27/2017  Arthritis of right knee 7/27/2017  Back pain 7/27/2017  Carotid atherosclerosis 7/27/2017  Cough 7/27/2017  Fatigue 7/27/2017  History of pneumonia 7/27/2017  
 HTN (hypertension) 7/27/2017  Hyperlipidemia 7/27/2017  Hypertension  Need for Streptococcus pneumoniae vaccination 7/27/2017  On statin therapy 7/27/2017  Plantar fasciitis 7/27/2017  Pneumonia  Shoulder sprain 7/27/2017  Syncope 7/27/2017 Past Surgical History:  
Procedure Laterality Date  HX GI    
 ruptured intestine Social History Socioeconomic History  Marital status: SINGLE Spouse name: Not on file  Number of children: Not on file  Years of education: Not on file  Highest education level: Not on file Tobacco Use  Smoking status: Never Smoker  Smokeless tobacco: Never Used Substance and Sexual Activity  Alcohol use: No  
 Drug use: No  
 Sexual activity: Never Family History Problem Relation Age of Onset  Hypertension Mother  Heart Disease Father Current Outpatient Medications Medication Sig Dispense Refill  valsartan-hydroCHLOROthiazide (DIOVAN-HCT) 160-12.5 mg per tablet TAKE 1 TABLET BY MOUTH EVERY DAY 90 Tab 3  
 aspirin delayed-release 81 mg tablet Take  by mouth daily.  pravastatin (PRAVACHOL) 40 mg tablet TAKE 1 TABLET BY MOUTH AT BEDTIME  11  
 clopidogrel (PLAVIX) 75 mg tab TAKE 1 TABLET BY MOUTH EVERY DAY  11  
 multivitamins-minerals-lutein (CENTRUM SILVER) Tab Take 1 Tab by mouth daily.  cefUROXime (CEFTIN) 500 mg tablet Take 1 Tab by mouth two (2) times a day. 14 Tab 0  predniSONE (STERAPRED DS) 10 mg dose pack Take as directed on packet 21 Tab 0 No Known Allergies Objective: 
Visit Vitals /62 (BP 1 Location: Left arm, BP Patient Position: Sitting) Pulse 69 Temp 98.6 °F (37 °C) (Oral) Resp 16 Ht 5' 2\" (1.575 m) Wt 146 lb 6.4 oz (66.4 kg) SpO2 97% BMI 26.78 kg/m² Physical Exam:  
General appearance - alert, well appearing, and in no distress Mental status - alert, oriented to person, place, and time EYE-PARESH, EOMI, fundi normal, corneas normal, no foreign bodies ENT-ENT exam normal, no neck nodes or sinus tenderness Nose - normal and patent, no erythema, discharge or polyps Mouth - mucous membranes moist, pharynx normal without lesions Neck -right carotid bruit Chest - clear to auscultation, no wheezes, rales or rhonchi, symmetric air entry Heart - normal rate, regular rhythm, normal S1, S2, no murmurs, rubs, clicks or gallops Abdomen - soft, nontender, nondistended, no masses or organomegaly Lymph- no adenopathy palpable Ext-peripheral pulses normal, no pedal edema, no clubbing or cyanosis Skin-Warm and dry. no hyperpigmentation, vitiligo, or suspicious lesions Neuro -alert, oriented, normal speech, no focal findings or movement disorder noted Musculoskeletal- FROM, no bony abnormalities, no point tenderness No results found for this visit on 02/28/20. All results for lab orders may not have been returned by the time this encountered was closed. Assessment/Plan: ICD-10-CM ICD-9-CM 1. Essential hypertension M09 466.3 METABOLIC PANEL, COMPREHENSIVE  
   CK  
2. On statin therapy D89.002 L83.13 METABOLIC PANEL, COMPREHENSIVE  
   CK 3. Pure hypercholesterolemia D95.08 339.8 METABOLIC PANEL, COMPREHENSIVE  
   CK Orders Placed This Encounter  METABOLIC PANEL, COMPREHENSIVE ([facility])  CK ([facility]) Plan: 
 
Continue current medical regimen as outlined above. Further recommendations based on labs as ordered. The patient is scheduled for a follow-up carotid Doppler and has a follow-up with ENT for hearing aids in the near future. I have reviewed with the patient details of the assessment and plan and all questions were answered. Relevent patient education was performed. Verbal and/or written instructions (see AVS) provided. The most recent lab findings were reviewed with the patient. Plan was discussed with patient who verbal expressed understanding. An After Visit Summary was printed and given to the patient.  
 
 
Marzette Krabbe, MD

## 2020-02-28 NOTE — PROGRESS NOTES
Norma Franco is a 80 y.o. female Chief Complaint Patient presents with  Hypertension 6 month follow up  Cholesterol Problem 6 month follow up Visit Vitals /62 (BP 1 Location: Left arm, BP Patient Position: Sitting) Pulse 69 Temp 98.6 °F (37 °C) (Oral) Resp 16 Ht 5' 2\" (1.575 m) Wt 146 lb 6.4 oz (66.4 kg) SpO2 97% BMI 26.78 kg/m² Health Maintenance Due Topic Date Due  
 DTaP/Tdap/Td series (1 - Tdap) 05/17/1943  Shingrix Vaccine Age 50> (1 of 2) 05/17/1982  GLAUCOMA SCREENING Q2Y  05/17/1997  Bone Densitometry (Dexa) Screening  05/17/1997  Pneumococcal 65+ years (2 of 2 - PPSV23) 02/14/2020  Medicare Yearly Exam  02/15/2020 1. Have you been to the ER, urgent care clinic since your last visit? Hospitalized since your last visit? No 
 
2. Have you seen or consulted any other health care providers outside of the [address] [place] [address] since your last visit? Include any pap smears or colon screening.  No

## 2020-03-02 NOTE — PROGRESS NOTES
Your blood sugar is mildly elevated at 146. Your previous blood sugar was 118. Your liver and kidney function are normal.  Avoid concentrated sweets or sugar. We will reevaluate on your next follow-up in 6 months.

## 2020-04-15 RX ORDER — VALSARTAN AND HYDROCHLOROTHIAZIDE 160; 12.5 MG/1; MG/1
0.5 TABLET, FILM COATED ORAL DAILY
Qty: 45 TAB | Refills: 3 | Status: SHIPPED | OUTPATIENT
Start: 2020-04-15 | End: 2021-01-01

## 2020-04-15 RX ORDER — PRAVASTATIN SODIUM 40 MG/1
40 TABLET ORAL
Qty: 90 TAB | Refills: 3 | Status: SHIPPED | OUTPATIENT
Start: 2020-04-15 | End: 2021-01-01

## 2020-04-15 NOTE — TELEPHONE ENCOUNTER
Requested Prescriptions     Pending Prescriptions Disp Refills    valsartan-hydroCHLOROthiazide (DIOVAN-HCT) 160-12.5 mg per tablet 45 Tab 3     Sig: Take 0.5 Tabs by mouth daily.  pravastatin (PRAVACHOL) 40 mg tablet 90 Tab 3     Si Tab.        Last Refill: valsartan-HCTZ 19 / pravastatin 17  Next Appointment:20

## 2020-08-17 ENCOUNTER — OFFICE VISIT (OUTPATIENT)
Dept: INTERNAL MEDICINE CLINIC | Age: 85
End: 2020-08-17
Payer: MEDICARE

## 2020-08-17 VITALS
SYSTOLIC BLOOD PRESSURE: 138 MMHG | HEART RATE: 64 BPM | TEMPERATURE: 99.4 F | HEIGHT: 62 IN | BODY MASS INDEX: 26.31 KG/M2 | WEIGHT: 143 LBS | DIASTOLIC BLOOD PRESSURE: 70 MMHG | RESPIRATION RATE: 18 BRPM | OXYGEN SATURATION: 97 %

## 2020-08-17 DIAGNOSIS — I27.20 PULMONARY HYPERTENSION, MODERATE TO SEVERE (HCC): ICD-10-CM

## 2020-08-17 DIAGNOSIS — Z79.899 ON STATIN THERAPY: ICD-10-CM

## 2020-08-17 DIAGNOSIS — Q21.10 ASD (ATRIAL SEPTAL DEFECT): ICD-10-CM

## 2020-08-17 DIAGNOSIS — I10 ESSENTIAL HYPERTENSION: Primary | ICD-10-CM

## 2020-08-17 DIAGNOSIS — E78.00 PURE HYPERCHOLESTEROLEMIA: ICD-10-CM

## 2020-08-17 LAB
A-G RATIO,AGRAT: 2.1 RATIO
ALBUMIN SERPL-MCNC: 4.9 G/DL (ref 3.9–5.4)
ALP SERPL-CCNC: 78 U/L (ref 38–126)
ALT SERPL-CCNC: 12 U/L (ref 0–35)
ANION GAP SERPL CALC-SCNC: 13 MMOL/L
AST SERPL W P-5'-P-CCNC: 23 U/L (ref 14–36)
BILIRUB SERPL-MCNC: 0.5 MG/DL (ref 0.2–1.3)
BUN SERPL-MCNC: 24 MG/DL (ref 7–17)
BUN/CREATININE RATIO,BUCR: 34 RATIO
CALCIUM SERPL-MCNC: 9.7 MG/DL (ref 8.4–10.2)
CHLORIDE SERPL-SCNC: 100 MMOL/L (ref 98–107)
CHOL/HDL RATIO,CHHD: 2 RATIO (ref 0–4)
CHOLEST SERPL-MCNC: 181 MG/DL (ref 0–200)
CK SERPL-CCNC: 26 U/L (ref 30–135)
CO2 SERPL-SCNC: 29 MMOL/L (ref 22–32)
CREAT SERPL-MCNC: 0.7 MG/DL (ref 0.7–1.2)
GLOBULIN,GLOB: 2.3
GLUCOSE SERPL-MCNC: 110 MG/DL (ref 65–105)
HDLC SERPL-MCNC: 78 MG/DL (ref 35–130)
LDL/HDL RATIO,LDHD: 1 RATIO
LDLC SERPL CALC-MCNC: 69 MG/DL (ref 0–130)
POTASSIUM SERPL-SCNC: 4.5 MMOL/L (ref 3.6–5)
PROT SERPL-MCNC: 7.2 G/DL (ref 6.3–8.2)
SODIUM SERPL-SCNC: 142 MMOL/L (ref 137–145)
TRIGL SERPL-MCNC: 171 MG/DL (ref 0–200)
VLDLC SERPL CALC-MCNC: 34 MG/DL

## 2020-08-17 PROCEDURE — G8536 NO DOC ELDER MAL SCRN: HCPCS | Performed by: INTERNAL MEDICINE

## 2020-08-17 PROCEDURE — G8419 CALC BMI OUT NRM PARAM NOF/U: HCPCS | Performed by: INTERNAL MEDICINE

## 2020-08-17 PROCEDURE — G8427 DOCREV CUR MEDS BY ELIG CLIN: HCPCS | Performed by: INTERNAL MEDICINE

## 2020-08-17 PROCEDURE — 80061 LIPID PANEL: CPT | Performed by: INTERNAL MEDICINE

## 2020-08-17 PROCEDURE — 82550 ASSAY OF CK (CPK): CPT | Performed by: INTERNAL MEDICINE

## 2020-08-17 PROCEDURE — G8432 DEP SCR NOT DOC, RNG: HCPCS | Performed by: INTERNAL MEDICINE

## 2020-08-17 PROCEDURE — 1090F PRES/ABSN URINE INCON ASSESS: CPT | Performed by: INTERNAL MEDICINE

## 2020-08-17 PROCEDURE — 99214 OFFICE O/P EST MOD 30 MIN: CPT | Performed by: INTERNAL MEDICINE

## 2020-08-17 PROCEDURE — 80053 COMPREHEN METABOLIC PANEL: CPT | Performed by: INTERNAL MEDICINE

## 2020-08-17 PROCEDURE — 1101F PT FALLS ASSESS-DOCD LE1/YR: CPT | Performed by: INTERNAL MEDICINE

## 2020-08-17 NOTE — PROGRESS NOTES
This note will not be viewable in [address] Esthela Lorenzana is a 80 y.o. female and presents with Hypertension (follow up) and Cholesterol Problem (follow up) Surekha Portillo Subjective: 
 
Mrs. Carlos Escalera presents today for follow-up of hypertension, hyperlipidemia, monitoring statin therapy. She has a history of pulmonary hypertension by previous echocardiogram and a remote history of PE. 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 She has mild carotid artery disease involving the right carotid artery and has follow-up with vascular surgery to monitor this. She denies any shortness of breath, chest pain, palpitations, PND, orthopnea, or pedal edema. She denies any lightheadedness or syncopal episodes. Review of Systems Constitutional:  
Eyes:   negative for visual disturbance and irritation ENT:   negative for tinnitus,sore throat,nasal congestion,ear pains. hoarseness Respiratory:  negative for cough, hemoptysis, dyspnea,wheezing CV:   negative for chest pain, palpitations, lower extremity edema GI:   negative for nausea, vomiting, diarrhea, abdominal pain,melena Endo:               negative for polyuria,polydipsia,polyphagia,heat intolerance Genitourinary: negative for frequency, dysuria and hematuria Integumentary: negative for rash and pruritus Hematologic:  negative for easy bruising and gum/nose bleeding Musculoskel: negative for myalgias, arthralgias, back pain, muscle weakness, joint pain Neurological:  negative for headaches, dizziness, vertigo, memory problems and gait Behavl/Psych: negative for feelings of anxiety, depression, mood changes Past Medical History:  
Diagnosis Date  AK (actinic keratosis) 7/27/2017  Arteriosclerotic coronary artery disease 7/27/2017  Arthritis of right knee 7/27/2017  Back pain 7/27/2017  Carotid atherosclerosis 7/27/2017  Cough 7/27/2017  Fatigue 7/27/2017  History of pneumonia 7/27/2017  
 HTN (hypertension) 7/27/2017  Hyperlipidemia 7/27/2017  Hypertension  Need for Streptococcus pneumoniae vaccination 7/27/2017  On statin therapy 7/27/2017  Plantar fasciitis 7/27/2017  Pneumonia  Shoulder sprain 7/27/2017  Syncope 7/27/2017 Past Surgical History:  
Procedure Laterality Date  HX GI    
 ruptured intestine Social History Socioeconomic History  Marital status: SINGLE Spouse name: Not on file  Number of children: Not on file  Years of education: Not on file  Highest education level: Not on file Tobacco Use  Smoking status: Never Smoker  Smokeless tobacco: Never Used Substance and Sexual Activity  Alcohol use: No  
 Drug use: No  
 Sexual activity: Never Family History Problem Relation Age of Onset  Hypertension Mother  Heart Disease Father Current Outpatient Medications Medication Sig Dispense Refill  valsartan-hydroCHLOROthiazide (DIOVAN-HCT) 160-12.5 mg per tablet Take 0.5 Tabs by mouth daily. 45 Tab 3  pravastatin (PRAVACHOL) 40 mg tablet Take 1 Tab by mouth nightly. 90 Tab 3  
 aspirin delayed-release 81 mg tablet Take  by mouth daily.  multivitamins-minerals-lutein (CENTRUM SILVER) Tab Take 1 Tab by mouth daily. No Known Allergies Objective: 
Visit Vitals /70 (BP 1 Location: Left arm, BP Patient Position: Sitting) Pulse 64 Temp 99.4 °F (37.4 °C) (Oral) Resp 18 Ht 5' 2\" (1.575 m) Wt 143 lb (64.9 kg) SpO2 97% BMI 26.16 kg/m² Physical Exam:  
General appearance - alert, well appearing, and in no distress Mental status - alert, oriented to person, place, and time EYE-PARESH, EOMI, fundi normal, corneas normal, no foreign bodies ENT-ENT exam normal, no neck nodes or sinus tenderness Nose - normal and patent, no erythema, discharge or polyps Mouth - mucous membranes moist, pharynx normal without lesions Neck - supple, no significant adenopathy Chest - clear to auscultation, no wheezes, rales or rhonchi, symmetric air entry Heart - normal rate, regular rhythm, normal S1, S2, no murmurs, rubs, clicks or gallops Abdomen - soft, nontender, nondistended, no masses or organomegaly Lymph- no adenopathy palpable Ext-peripheral pulses normal, no pedal edema, no clubbing or cyanosis Skin-Warm and dry. no hyperpigmentation, vitiligo, or suspicious lesions Neuro -alert, oriented, normal speech, no focal findings or movement disorder noted Musculoskeletal- FROM, no bony abnormalities, no point tenderness No results found for this visit on 08/17/20. All results for lab orders may not have been returned by the time this encountered was closed. Assessment/Plan: ICD-10-CM ICD-9-CM 1. Essential hypertension  I10 401.9 2. Pulmonary hypertension, moderate to severe (HCC)  I27.20 416.8 3. Pulmonary hypertension, moderate to severe- on echo  I27.20 416.8 4. ASD (atrial septal defect) VS PFO on echo  Q21.1 745.5 5. Pure hypercholesterolemia  E78.00 272.0   
6. On statin therapy  Z79.899 V58.69 No orders of the defined types were placed in this encounter. Plan: 
 
Continue current medical regimen. Further recommendations based on labs as ordered. I have reviewed with the patient details of the assessment and plan and all questions were answered. Relevent patient education was performed. Verbal and/or written instructions (see AVS) provided. The most recent lab findings were reviewed with the patient. Plan was discussed with patient who verbal expressed understanding. An After Visit Summary was printed and given to the patient.  
 
 
Darren Gilliam MD

## 2020-08-17 NOTE — PROGRESS NOTES
Melina Jaimes presents today at the clinic for Chief Complaint Patient presents with  Hypertension  
  follow up  Cholesterol Problem  
  follow up Wt Readings from Last 3 Encounters:  
08/17/20 143 lb (64.9 kg) 02/28/20 146 lb 6.4 oz (66.4 kg) 12/11/19 146 lb 11.2 oz (66.5 kg) Temp Readings from Last 3 Encounters:  
08/17/20 99.4 °F (37.4 °C) (Oral) 02/28/20 98.6 °F (37 °C) (Oral) 12/11/19 98.9 °F (37.2 °C) (Oral) BP Readings from Last 3 Encounters:  
08/17/20 138/70  
02/28/20 128/62  
12/11/19 128/64 Pulse Readings from Last 3 Encounters:  
08/17/20 64  
02/28/20 69  
12/11/19 73 Health Maintenance Due Topic  DTaP/Tdap/Td series (1 - Tdap)  Shingrix Vaccine Age 50> (1 of 2)  GLAUCOMA SCREENING Q2Y  Bone Densitometry (Dexa) Screening  Pneumococcal 65+ years (2 of 2 - PPSV23)  Medicare Yearly Exam   
 Influenza Age 5 to Adult  Lipid Screen Learning Assessment: 
:  
 
Learning Assessment 7/27/2017 PRIMARY LEARNER Patient HIGHEST LEVEL OF EDUCATION - PRIMARY LEARNER  2 YEARS OF COLLEGE  
BARRIERS PRIMARY LEARNER NONE  
CO-LEARNER CAREGIVER No  
PRIMARY LANGUAGE ENGLISH  
LEARNER PREFERENCE PRIMARY DEMONSTRATION  
ANSWERED BY patient RELATIONSHIP SELF Depression Screening: 
:  
 
3 most recent PHQ Screens 2/28/2020 Little interest or pleasure in doing things Not at all Feeling down, depressed, irritable, or hopeless Not at all Total Score PHQ 2 0 Fall Risk Assessment: 
:  
 
Fall Risk Assessment, last 12 mths 2/28/2020 Able to walk? Yes Fall in past 12 months? No  
 
 
Abuse Screening: 
:  
 
Abuse Screening Questionnaire 2/14/2019 7/27/2017 Do you ever feel afraid of your partner? Kolleen Numbers Are you in a relationship with someone who physically or mentally threatens you? Vikkileen Numbers Is it safe for you to go home? Yulia Tapia Coordination of Care Questionnaire: 
:  
 
 1. Have you been to the ER, urgent care clinic since your last visit? Hospitalized since your last visit? no 
 
2. Have you seen or consulted any other health care providers outside of the [address] [place] [address] since your last visit? Include any pap smears or colon screening.  no

## 2020-10-17 ENCOUNTER — HOSPITAL ENCOUNTER (EMERGENCY)
Age: 85
Discharge: HOME | End: 2020-10-17
Attending: EMERGENCY MEDICINE | Admitting: EMERGENCY MEDICINE
Payer: MEDICARE

## 2020-10-17 ENCOUNTER — APPOINTMENT (OUTPATIENT)
Dept: CT IMAGING | Age: 85
End: 2020-10-17
Attending: EMERGENCY MEDICINE
Payer: MEDICARE

## 2020-10-17 VITALS
RESPIRATION RATE: 16 BRPM | WEIGHT: 143.08 LBS | BODY MASS INDEX: 26.33 KG/M2 | TEMPERATURE: 97.9 F | HEIGHT: 62 IN | OXYGEN SATURATION: 96 % | HEART RATE: 65 BPM | SYSTOLIC BLOOD PRESSURE: 171 MMHG | DIASTOLIC BLOOD PRESSURE: 62 MMHG

## 2020-10-17 DIAGNOSIS — R55 SYNCOPE AND COLLAPSE: ICD-10-CM

## 2020-10-17 DIAGNOSIS — S09.90XA CLOSED HEAD INJURY, INITIAL ENCOUNTER: Primary | ICD-10-CM

## 2020-10-17 DIAGNOSIS — N30.00 ACUTE CYSTITIS WITHOUT HEMATURIA: ICD-10-CM

## 2020-10-17 LAB
ALBUMIN SERPL-MCNC: 3.9 G/DL (ref 3.5–5)
ALBUMIN/GLOB SERPL: 1.2 {RATIO} (ref 1.1–2.2)
ALP SERPL-CCNC: 77 U/L (ref 45–117)
ALT SERPL-CCNC: 20 U/L (ref 12–78)
AMORPH CRY URNS QL MICRO: ABNORMAL
ANION GAP SERPL CALC-SCNC: 3 MMOL/L (ref 5–15)
APPEARANCE UR: ABNORMAL
AST SERPL-CCNC: 15 U/L (ref 15–37)
BACTERIA URNS QL MICRO: ABNORMAL /HPF
BASOPHILS # BLD: 0 K/UL (ref 0–0.1)
BASOPHILS NFR BLD: 0 % (ref 0–1)
BILIRUB SERPL-MCNC: 0.4 MG/DL (ref 0.2–1)
BILIRUB UR QL: NEGATIVE
BUN SERPL-MCNC: 20 MG/DL (ref 6–20)
BUN/CREAT SERPL: 24 (ref 12–20)
CALCIUM SERPL-MCNC: 9.2 MG/DL (ref 8.5–10.1)
CHLORIDE SERPL-SCNC: 103 MMOL/L (ref 97–108)
CO2 SERPL-SCNC: 33 MMOL/L (ref 21–32)
COLOR UR: ABNORMAL
CREAT SERPL-MCNC: 0.85 MG/DL (ref 0.55–1.02)
DIFFERENTIAL METHOD BLD: ABNORMAL
EOSINOPHIL # BLD: 0 K/UL (ref 0–0.4)
EOSINOPHIL NFR BLD: 0 % (ref 0–7)
EPITH CASTS URNS QL MICRO: ABNORMAL /LPF
ERYTHROCYTE [DISTWIDTH] IN BLOOD BY AUTOMATED COUNT: 13.9 % (ref 11.5–14.5)
GLOBULIN SER CALC-MCNC: 3.2 G/DL (ref 2–4)
GLUCOSE SERPL-MCNC: 189 MG/DL (ref 65–100)
GLUCOSE UR STRIP.AUTO-MCNC: NEGATIVE MG/DL
GRAN CASTS URNS QL MICRO: ABNORMAL /LPF
HCT VFR BLD AUTO: 33.5 % (ref 35–47)
HGB BLD-MCNC: 11.3 G/DL (ref 11.5–16)
HGB UR QL STRIP: NEGATIVE
HYALINE CASTS URNS QL MICRO: ABNORMAL /LPF (ref 0–5)
IMM GRANULOCYTES # BLD AUTO: 0.1 K/UL (ref 0–0.04)
IMM GRANULOCYTES NFR BLD AUTO: 1 % (ref 0–0.5)
INR PPP: 1 (ref 0.9–1.1)
KETONES UR QL STRIP.AUTO: NEGATIVE MG/DL
LEUKOCYTE ESTERASE UR QL STRIP.AUTO: ABNORMAL
LYMPHOCYTES # BLD: 0.7 K/UL (ref 0.8–3.5)
LYMPHOCYTES NFR BLD: 8 % (ref 12–49)
MCH RBC QN AUTO: 31.6 PG (ref 26–34)
MCHC RBC AUTO-ENTMCNC: 33.7 G/DL (ref 30–36.5)
MCV RBC AUTO: 93.6 FL (ref 80–99)
MONOCYTES # BLD: 0.5 K/UL (ref 0–1)
MONOCYTES NFR BLD: 6 % (ref 5–13)
MUCOUS THREADS URNS QL MICRO: ABNORMAL /LPF
NEUTS SEG # BLD: 7.2 K/UL (ref 1.8–8)
NEUTS SEG NFR BLD: 85 % (ref 32–75)
NITRITE UR QL STRIP.AUTO: NEGATIVE
NRBC # BLD: 0 K/UL (ref 0–0.01)
NRBC BLD-RTO: 0 PER 100 WBC
OTHER,OTHU: ABNORMAL
PH UR STRIP: 7 [PH] (ref 5–8)
PLATELET # BLD AUTO: 163 K/UL (ref 150–400)
PMV BLD AUTO: 9.8 FL (ref 8.9–12.9)
POTASSIUM SERPL-SCNC: 3.8 MMOL/L (ref 3.5–5.1)
PROT SERPL-MCNC: 7.1 G/DL (ref 6.4–8.2)
PROT UR STRIP-MCNC: NEGATIVE MG/DL
PROTHROMBIN TIME: 10.9 SEC (ref 9–11.1)
RBC # BLD AUTO: 3.58 M/UL (ref 3.8–5.2)
RBC #/AREA URNS HPF: ABNORMAL /HPF (ref 0–5)
RBC MORPH BLD: ABNORMAL
SODIUM SERPL-SCNC: 139 MMOL/L (ref 136–145)
SP GR UR REFRACTOMETRY: 1.02 (ref 1–1.03)
TROPONIN I SERPL-MCNC: <0.05 NG/ML
UA: UC IF INDICATED,UAUC: ABNORMAL
UROBILINOGEN UR QL STRIP.AUTO: 0.2 EU/DL (ref 0.2–1)
WBC # BLD AUTO: 8.5 K/UL (ref 3.6–11)
WBC URNS QL MICRO: ABNORMAL /HPF (ref 0–4)

## 2020-10-17 PROCEDURE — 99285 EMERGENCY DEPT VISIT HI MDM: CPT

## 2020-10-17 PROCEDURE — 85610 PROTHROMBIN TIME: CPT

## 2020-10-17 PROCEDURE — 81001 URINALYSIS AUTO W/SCOPE: CPT

## 2020-10-17 PROCEDURE — 80053 COMPREHEN METABOLIC PANEL: CPT

## 2020-10-17 PROCEDURE — 85025 COMPLETE CBC W/AUTO DIFF WBC: CPT

## 2020-10-17 PROCEDURE — 74011250637 HC RX REV CODE- 250/637: Performed by: EMERGENCY MEDICINE

## 2020-10-17 PROCEDURE — 70450 CT HEAD/BRAIN W/O DYE: CPT

## 2020-10-17 PROCEDURE — 36415 COLL VENOUS BLD VENIPUNCTURE: CPT

## 2020-10-17 PROCEDURE — 93005 ELECTROCARDIOGRAM TRACING: CPT

## 2020-10-17 PROCEDURE — 84484 ASSAY OF TROPONIN QUANT: CPT

## 2020-10-17 RX ORDER — TRAMADOL HYDROCHLORIDE 50 MG/1
50 TABLET ORAL
Status: COMPLETED | OUTPATIENT
Start: 2020-10-17 | End: 2020-10-17

## 2020-10-17 RX ORDER — CEPHALEXIN 500 MG/1
500 CAPSULE ORAL 3 TIMES DAILY
Qty: 15 CAP | Refills: 0 | Status: SHIPPED | OUTPATIENT
Start: 2020-10-17 | End: 2020-11-23 | Stop reason: ALTCHOICE

## 2020-10-17 RX ORDER — HYDROCODONE BITARTRATE AND ACETAMINOPHEN 5; 325 MG/1; MG/1
1 TABLET ORAL
Status: DISCONTINUED | OUTPATIENT
Start: 2020-10-17 | End: 2020-10-17 | Stop reason: HOSPADM

## 2020-10-17 RX ORDER — CEPHALEXIN 250 MG/1
500 CAPSULE ORAL
Status: DISCONTINUED | OUTPATIENT
Start: 2020-10-17 | End: 2020-10-17 | Stop reason: HOSPADM

## 2020-10-17 RX ADMIN — TRAMADOL HYDROCHLORIDE 50 MG: 50 TABLET, FILM COATED ORAL at 11:33

## 2020-10-17 NOTE — DISCHARGE INSTRUCTIONS
Patient Education        Learning About a Closed Head Injury  What is a closed head injury? A closed head injury happens when your head gets hit hard. The strong force of the blow causes your brain to shake in your skull. This movement can cause the brain to bruise, swell, or tear. Sometimes nerves or blood vessels also get damaged. This can cause bleeding in or around the brain. A concussion is a type of closed head injury. What are the symptoms? If you have a mild concussion, you may have a mild headache or feel \"not quite right. \" These symptoms are common. They usually go away over a few days to 4 weeks. But sometimes after a concussion, you feel like you can't function as well as before the injury. And you have new symptoms. This is called postconcussive syndrome. You may:  · Find it harder to solve problems, think, concentrate, or remember. · Have headaches. · Have changes in your sleep patterns, such as not being able to sleep or sleeping all the time. · Have changes in your personality. · Not be interested in your usual activities. · Feel angry or anxious without a clear reason. · Lose your sense of taste or smell. · Be dizzy, lightheaded, or unsteady. It may be hard to stand or walk. How is a closed head injury treated? Any person who may have a concussion needs to see a doctor. Some people have to stay in the hospital to be watched. Others can go home safely. If you go home, follow your doctor's instructions. He or she will tell you if you need someone to watch you closely for the next 24 hours or longer. Rest is the best treatment. Get plenty of sleep at night. And try to rest during the day. · Avoid activities that are physically or mentally demanding. These include housework, exercise, and schoolwork. And don't play video games, send text messages, or use the computer. You may need to change your school or work schedule to be able to avoid these activities.   · Ask your doctor when it's okay to drive, ride a bike, or operate machinery. · Take an over-the-counter pain medicine, such as acetaminophen (Tylenol), ibuprofen (Advil, Motrin), or naproxen (Aleve). Be safe with medicines. Read and follow all instructions on the label. · Check with your doctor before you use any other medicines for pain. · Do not drink alcohol or use illegal drugs. They can slow recovery. They can also increase your risk of getting a second head injury. Follow-up care is a key part of your treatment and safety. Be sure to make and go to all appointments, and call your doctor if you are having problems. It's also a good idea to know your test results and keep a list of the medicines you take. Where can you learn more? Go to http://www.gray.com/  Enter E235 in the search box to learn more about \"Learning About a Closed Head Injury. \"  Current as of: November 20, 2019               Content Version: 12.6  © 6723-1888 [employer]. Care instructions adapted under license by [employer] (which disclaims liability or warranty for this information). If you have questions about a medical condition or this instruction, always ask your healthcare professional. [place] [address] any warranty or liability for your use of this information. Patient Education        Fainting: Care Instructions  Your Care Instructions     When you faint, or pass out, you lose consciousness for a short time. A brief drop in blood flow to the brain often causes it. When you fall or lie down, more blood flows to your brain and you regain consciousness. Emotional stress, pain, or overheating--especially if you have been standing--can make you faint. In these cases, fainting is usually not serious. But fainting can be a sign of a more serious problem. Your doctor may want you to have more tests to rule out other causes.   The treatment you need depends on the reason why you fainted. The doctor has checked you carefully, but problems can develop later. If you notice any problems or new symptoms, get medical treatment right away. Follow-up care is a key part of your treatment and safety. Be sure to make and go to all appointments, and call your doctor if you are having problems. It's also a good idea to know your test results and keep a list of the medicines you take. How can you care for yourself at home? · Drink plenty of fluids to prevent dehydration. If you have kidney, heart, or liver disease and have to limit fluids, talk with your doctor before you increase your fluid intake. When should you call for help? Call 911 anytime you think you may need emergency care. For example, call if:    · You have symptoms of a heart problem. These may include:  ? Chest pain or pressure. ? Severe trouble breathing. ? A fast or irregular heartbeat. ? Lightheadedness or sudden weakness. ? Coughing up pink, foamy mucus. ? Passing out. After you call 911, the  may tell you to chew 1 adult-strength or 2 to 4 low-dose aspirin. Wait for an ambulance. Do not try to drive yourself.     · You have symptoms of a stroke. These may include:  ? Sudden numbness, tingling, weakness, or loss of movement in your face, arm, or leg, especially on only one side of your body. ? Sudden vision changes. ? Sudden trouble speaking. ? Sudden confusion or trouble understanding simple statements. ? Sudden problems with walking or balance. ? A sudden, severe headache that is different from past headaches.     · You passed out (lost consciousness) again. Watch closely for changes in your health, and be sure to contact your doctor if:    · You do not get better as expected. Where can you learn more? Go to http://www.gray.com/  Enter A848 in the search box to learn more about \"Fainting: Care Instructions. \"  Current as of: June 26, 2019               Content Version: 12.6  © 1196-9277 [employer]. Care instructions adapted under license by [employer] (which disclaims liability or warranty for this information). If you have questions about a medical condition or this instruction, always ask your healthcare professional. [place] [address] any warranty or liability for your use of this information. Patient Education        Urinary Tract Infection in Women: Care Instructions  Your Care Instructions     A urinary tract infection, or UTI, is a general term for an infection anywhere between the kidneys and the urethra (where urine comes out). Most UTIs are bladder infections. They often cause pain or burning when you urinate. UTIs are caused by bacteria and can be cured with antibiotics. Be sure to complete your treatment so that the infection goes away. Follow-up care is a key part of your treatment and safety. Be sure to make and go to all appointments, and call your doctor if you are having problems. It's also a good idea to know your test results and keep a list of the medicines you take. How can you care for yourself at home? · Take your antibiotics as directed. Do not stop taking them just because you feel better. You need to take the full course of antibiotics. · Drink extra water and other fluids for the next day or two. This may help wash out the bacteria that are causing the infection. (If you have kidney, heart, or liver disease and have to limit fluids, talk with your doctor before you increase your fluid intake.)  · Avoid drinks that are carbonated or have caffeine. They can irritate the bladder. · Urinate often. Try to empty your bladder each time. · To relieve pain, take a hot bath or lay a heating pad set on low over your lower belly or genital area. Never go to sleep with a heating pad in place. To prevent UTIs  · Drink plenty of water each day. This helps you urinate often, which clears bacteria from your system.  (If you have kidney, heart, or liver disease and have to limit fluids, talk with your doctor before you increase your fluid intake.)  · Urinate when you need to. · Urinate right after you have sex. · Change sanitary pads often. · Avoid douches, bubble baths, feminine hygiene sprays, and other feminine hygiene products that have deodorants. · After going to the bathroom, wipe from front to back. When should you call for help? Call your doctor now or seek immediate medical care if:    · Symptoms such as fever, chills, nausea, or vomiting get worse or appear for the first time.     · You have new pain in your back just below your rib cage. This is called flank pain.     · There is new blood or pus in your urine.     · You have any problems with your antibiotic medicine. Watch closely for changes in your health, and be sure to contact your doctor if:    · You are not getting better after taking an antibiotic for 2 days.     · Your symptoms go away but then come back. Where can you learn more? Go to http://www.gray.com/  Enter Z787 in the search box to learn more about \"Urinary Tract Infection in Women: Care Instructions. \"  Current as of: June 29, 2020               Content Version: 12.6  © 8624-0124 [employer]. Care instructions adapted under license by [employer] (which disclaims liability or warranty for this information). If you have questions about a medical condition or this instruction, always ask your healthcare professional. [place] [address] any warranty or liability for your use of this information.

## 2020-10-18 LAB
ATRIAL RATE: 312 BPM
CALCULATED R AXIS, ECG10: 9 DEGREES
CALCULATED T AXIS, ECG11: 36 DEGREES
DIAGNOSIS, 93000: NORMAL
Q-T INTERVAL, ECG07: 422 MS
QRS DURATION, ECG06: 100 MS
QTC CALCULATION (BEZET), ECG08: 431 MS
VENTRICULAR RATE, ECG03: 63 BPM

## 2020-10-19 NOTE — ED PROVIDER NOTES
EMERGENCY DEPARTMENT HISTORY AND PHYSICAL EXAM 
 
 
Date: 10/17/2020 Patient Name: Abhishek Patton History of Presenting Illness Chief Complaint Patient presents with  
 Head Injury  
  she was getting her morning medication ready and next thing she knew she was on the floor; she used her alert bracelet to call rescue. pt has bruise swollen back of left side of head. History Provided By: Patient HPI: Abhishek Patton, 80 y.o. female presents to the ED with cc of head injury. Patient states that she was at home this morning and was getting her medications from ready for the day and then remembers being on the floor. She is unsure as to whether she may have passed out or not. Prior to the incident she denies any symptoms such as lightheadedness, headache, chest pain, palpitations. She has been in good health recently has no reported fever or chills. She denies any chest pain, cough or cold symptoms, shortness of breath. She denies any abdominal pain, nausea, vomiting or diarrhea. She denies any recent urinary symptoms. She denies any neck or back pain. She states that she does have a headache rated at a 5 out of 10 to the posterior portion with no alleviating factors. Pain worsened with palpation and putting pressure on the area. No one was with her at the time of the incident. She denies any prior history of seizures. She denies any cuts or scrapes to the tongue. There was no incontinence of bowel or bladder. There are no other complaints, changes, or physical findings at this time. PCP: Renu Rowley MD 
 
No current facility-administered medications on file prior to encounter. Current Outpatient Medications on File Prior to Encounter Medication Sig Dispense Refill  valsartan-hydroCHLOROthiazide (DIOVAN-HCT) 160-12.5 mg per tablet Take 0.5 Tabs by mouth daily. 45 Tab 3  pravastatin (PRAVACHOL) 40 mg tablet Take 1 Tab by mouth nightly.  90 Tab 3  
  aspirin delayed-release 81 mg tablet Take  by mouth daily.  multivitamins-minerals-lutein (CENTRUM SILVER) Tab Take 1 Tab by mouth daily. Past History Past Medical History: 
Past Medical History:  
Diagnosis Date  AK (actinic keratosis) 7/27/2017  Arteriosclerotic coronary artery disease 7/27/2017  Arthritis of right knee 7/27/2017  Back pain 7/27/2017  Carotid atherosclerosis 7/27/2017  Cough 7/27/2017  Fatigue 7/27/2017  History of pneumonia 7/27/2017  
 HTN (hypertension) 7/27/2017  Hyperlipidemia 7/27/2017  Hypertension  Need for Streptococcus pneumoniae vaccination 7/27/2017  On statin therapy 7/27/2017  Plantar fasciitis 7/27/2017  Pneumonia  Shoulder sprain 7/27/2017  Syncope 7/27/2017 Past Surgical History: 
Past Surgical History:  
Procedure Laterality Date  HX GI    
 ruptured intestine Family History: 
Family History Problem Relation Age of Onset  Hypertension Mother  Heart Disease Father Social History: 
Social History Tobacco Use  Smoking status: Never Smoker  Smokeless tobacco: Never Used Substance Use Topics  Alcohol use: No  
 Drug use: No  
 
 
Allergies: 
No Known Allergies Review of Systems Review of Systems Constitutional: Negative. Negative for appetite change, chills, fatigue and fever. HENT: Negative. Negative for congestion, rhinorrhea, sinus pressure and sore throat. Eyes: Negative. Respiratory: Negative. Negative for cough, choking, chest tightness, shortness of breath and wheezing. Cardiovascular: Negative. Negative for chest pain, palpitations and leg swelling. Gastrointestinal: Negative for abdominal pain, constipation, diarrhea, nausea and vomiting. Endocrine: Negative. Genitourinary: Negative. Negative for difficulty urinating, dysuria, flank pain and urgency. Musculoskeletal: Negative. Skin: Negative. Neurological: Positive for headaches. Negative for dizziness, speech difficulty, weakness, light-headedness and numbness. Psychiatric/Behavioral: Negative. All other systems reviewed and are negative. Physical Exam  
Physical Exam 
Vitals signs and nursing note reviewed. Constitutional:   
   General: She is not in acute distress. Appearance: She is well-developed. She is not diaphoretic. Comments: Elderly female appears younger than stated age, no acute distress HENT:  
   Head: Normocephalic and atraumatic. Comments: Posterior scalp contusion Mouth/Throat:  
   Mouth: Mucous membranes are moist.  
   Pharynx: No oropharyngeal exudate. Eyes:  
   Extraocular Movements: Extraocular movements intact. Conjunctiva/sclera: Conjunctivae normal.  
   Pupils: Pupils are equal, round, and reactive to light. Neck: Musculoskeletal: Normal range of motion and neck supple. Vascular: No JVD. Trachea: No tracheal deviation. Cardiovascular:  
   Rate and Rhythm: Normal rate and regular rhythm. Pulses: Normal pulses. Heart sounds: Normal heart sounds. No murmur. Pulmonary:  
   Effort: Pulmonary effort is normal. No respiratory distress. Breath sounds: Normal breath sounds. No stridor. No wheezing or rales. Abdominal:  
   General: There is no distension. Palpations: Abdomen is soft. Tenderness: There is no abdominal tenderness. There is no guarding or rebound. Musculoskeletal: Normal range of motion. General: No tenderness, deformity or signs of injury. Comments: No C/T/L spine tenderness, no tenderness palpation bilateral upper extremities, bilateral lower extremities, pelvis, distal PMS intact Skin: 
   General: Skin is warm and dry. Capillary Refill: Capillary refill takes less than 2 seconds. Neurological:  
   Mental Status: She is alert and oriented to person, place, and time. Cranial Nerves: No cranial nerve deficit. Comments: No gross motor or sensory deficits Psychiatric:     
   Mood and Affect: Mood normal.     
   Behavior: Behavior normal.  
 
 
 
Diagnostic Study Results Labs - 
  
Contains abnormal data  URINALYSIS W/ REFLEX CULTURE (Final result)  
 Component (Lab Inquiry) Collection Time  Result Time  COLOR  [facility]  JOSE  PROTU   
10/17/20 13:04:00  10/17/20 13:50:46  YELLOW/STRAW Color Reference Range:. .. HAZYAbnormal    1.020  7.0  Negative   
    
Collection Time  Result Time  GLUCU  KETU  BILU  BLDU  UROU   
10/17/20 13:04:00  10/17/20 13:50:46  Negative  Negative  Negative  Negative  0.2   
    
Collection Time  Result Time  SEBASTIEN  LEUKU  WBCU  RBCU  EPSU   
10/17/20 13:04:00  10/17/20 13:50:46  Negative  MODERATEAbnormal    5-10  0-5  FEW Epithelial cell catego. ..     
Collection Time  Result Time  [facility] PLANO  HYCST   
10/17/20 13:04:00  10/17/20 13:50:46  1+Abnormal    CULTURE NOT INDICATED BY UA RESULT  2+Abnormal    2+Abnormal    10-20   
    
Collection Time  Result Time  GRCST  OTHU   
10/17/20 13:04:00  10/17/20 13:50:46  5-10Abnormal    Renal Epithelial cells Present   
    
Final result   
   
  
  
  
    
    
Contains abnormal data  CBC WITH AUTOMATED DIFF (Final result)  
 Component (Lab Inquiry) Collection Time  Result Time  WBC  RBC  HGB  HCT  MCV   
10/17/20 10:42:00  10/17/20 11:40:19  8.5  3.58Low    11.3Low    33.5Low    93.6   
    
Collection Time  Result Time  MCH  MCHC  RDW  PLT  MPLV   
10/17/20 10:42:00  10/17/20 11:40:19  31.6  33.7  13.9  163  9.8   
    
Collection Time  Result Time  NRBC  ANRBC  GRANS  LYMPH  MONOS   
10/17/20 10:42:00  10/17/20 11:40:19  0.0  0.00  85High    8Low    6   
    
Collection Time  Result Time  EOS  BASOS  IG  ABG  ABL   
10/17/20 10:42:00  10/17/20 11:40:19  0  0  1High    7.2  0.7Low     
    
Collection Time  Result Time  ABM  ANIA  ABB  AIG  DF   
 10/17/20 10:42:00  10/17/20 11:40:19  0.5  0.0  0.0  0.1High    SMEAR SCANNED   
    
Collection Time  Result Time  RCOM   
10/17/20 10:42:00  10/17/20 11:40:19  NORMOCYTIC, NORMOCHROMIC   
    
Final result   
   
  
  
  
    
    
Contains abnormal data  METABOLIC PANEL, COMPREHENSIVE (Final result)  
 Component (Lab Inquiry) Collection Time  Result Time  NA  K  CL  CO2  AGAP   
10/17/20 10:42:00  10/17/20 11:17:37  139  3.8  103  33High    3Low     
    
Collection Time  Result Time  GLU  BUN  CREA  BUCR  GFRAA   
10/17/20 10:42:00  10/17/20 11:17:37  189High    20  0.85  24High    >60   
    
Collection Time  Result Time  GFRNA  CA  TBIL  GPT  SGOT   
10/17/20 10:42:00  10/17/20 11:17:37  >60 Estimated GFR is calcu. ..  9.2  0.4  20  15   
    
Collection Time  Result Time  AP  TP  ALB  GLOB  AGRAT   
10/17/20 10:42:00  10/17/20 11:17:37  77  7.1  3.9  3.2  1.2   
    
Final result   
   
  
  
  
    
    
TROPONIN I (Final result)  
 Component (Lab Inquiry) Collection Time  Result Time  TROIQ   
10/17/20 10:42:00  10/17/20 11:17:37  <0.05 The presence of detect. ..   
 
    
Final result   
   
  
  
  
    
 
    
PROTHROMBIN TIME + INR (Final result)  
 Component (Lab Inquiry) Collection Time  Result Time  INR  PTP   
10/17/20 10:42:00  10/17/20 11:07:25  1.0 A single therapeutic r...  10.9   
 
    
Final result   
   
  
  
  
    
 
 
 
Radiologic Studies -  
CT HEAD WO CONT Final Result IMPRESSION:  
1. No evidence of acute intracranial abnormality. CT Results  (Last 48 hours) 10/17/20 1138  CT HEAD WO CONT Final result Impression:  IMPRESSION:  
1. No evidence of acute intracranial abnormality. Narrative:  EXAM:  CT HEAD WO CONT INDICATION:   Ground level fall, post scalp hematoma, headache COMPARISON: CT head 5/18/2015. TECHNIQUE: Unenhanced CT of the head was performed using 5 mm images.  Brain and  
 bone windows were generated. CT dose reduction was achieved through use of a  
standardized protocol tailored for this examination and automatic exposure  
control for dose modulation. FINDINGS:  
Unchanged generalized parenchymal volume loss with commensurate dilation of the  
sulci and ventricular system. Unchanged scattered periventricular and deep white  
matter hypodensities, consistent with mild chronic microangiopathic ischemic  
changes. Basilar cisterns are patent. No midline shift. There is no evidence of  
acute infarct, hemorrhage, or extraaxial fluid collection. The paranasal sinuses, mastoid air cells, and middle ears are clear. The orbital  
contents are within normal limits with bilateral lens implants. There are no  
significant osseous or extracranial soft tissue lesions. Severe degenerative  
disease at C1-C2. CXR Results  (Last 48 hours) None Medical Decision Making I am the first provider for this patient. I reviewed the vital signs, available nursing notes, past medical history, past surgical history, family history and social history. Vital Signs-Reviewed the patient's vital signs. EKG interpretation: (Preliminary) Sinus rhythm, rate 63, normal OK/QRS/axis, no acute ST changes. Records Reviewed: Nursing Notes, Old Medical Records, Ambulance Run Sheet, Previous Radiology Studies and Previous Laboratory Studies Provider Notes (Medical Decision Making): DDx-close head injury, ICH, scalp contusion, electrolyte abnormality, dehydration, UTI, ACS 
 
ED Course:  
Initial assessment performed. The patients presenting problems have been discussed, and they are in agreement with the care plan formulated and outlined with them. I have encouraged them to ask questions as they arise throughout their visit. Pt with no symptoms prior to the fall v syncopal episode.  She has not had any symptoms and/or pain here in the ED. CT head neg, Ua- likely UTI will start Ab, UCx pending. Disposition: 
DC home DISCHARGE PLAN: 
1. Discharge Medication List as of 10/17/2020  2:54 PM  
  
START taking these medications Details  
cephALEXin (Keflex) 500 mg capsule Take 1 Cap by mouth three (3) times daily. , Normal, Disp-15 Cap,R-0 CONTINUE these medications which have NOT CHANGED Details  
valsartan-hydroCHLOROthiazide (DIOVAN-HCT) 160-12.5 mg per tablet Take 0.5 Tabs by mouth daily. , Normal, Disp-45 Tab,R-3  
  
pravastatin (PRAVACHOL) 40 mg tablet Take 1 Tab by mouth nightly., Normal, Disp-90 Tab,R-3  
  
aspirin delayed-release 81 mg tablet Take  by mouth daily. , Historical Med  
  
multivitamins-minerals-lutein (CENTRUM SILVER) Tab Take 1 Tab by mouth daily. , Historical Med 2. Follow-up Information None 3. Return to ED if worse Diagnosis Clinical Impression: 1. Closed head injury, initial encounter 2. Acute cystitis without hematuria 3. Syncope and collapse Attestations: 
 
Sylvie Douglas, DO Please note that this dictation was completed with [employer], the computer voice recognition software. Quite often unanticipated grammatical, syntax, homophones, and other interpretive errors are inadvertently transcribed by the computer software. Please disregard these errors. Please excuse any errors that have escaped final proofreading. Thank you.

## 2020-10-21 ENCOUNTER — OFFICE VISIT (OUTPATIENT)
Dept: INTERNAL MEDICINE CLINIC | Age: 85
End: 2020-10-21
Payer: MEDICARE

## 2020-10-21 VITALS
BODY MASS INDEX: 26.57 KG/M2 | TEMPERATURE: 98.6 F | RESPIRATION RATE: 18 BRPM | HEART RATE: 67 BPM | OXYGEN SATURATION: 95 % | WEIGHT: 144.4 LBS | HEIGHT: 62 IN | DIASTOLIC BLOOD PRESSURE: 72 MMHG | SYSTOLIC BLOOD PRESSURE: 140 MMHG

## 2020-10-21 DIAGNOSIS — I10 ESSENTIAL HYPERTENSION: ICD-10-CM

## 2020-10-21 DIAGNOSIS — R55 SYNCOPE, UNSPECIFIED SYNCOPE TYPE: ICD-10-CM

## 2020-10-21 DIAGNOSIS — Z76.89 ENCOUNTER FOR SUPPORT AND COORDINATION OF TRANSITION OF CARE: Primary | ICD-10-CM

## 2020-10-21 PROCEDURE — 99213 OFFICE O/P EST LOW 20 MIN: CPT | Performed by: INTERNAL MEDICINE

## 2020-10-21 PROCEDURE — G8427 DOCREV CUR MEDS BY ELIG CLIN: HCPCS | Performed by: INTERNAL MEDICINE

## 2020-10-21 NOTE — PROGRESS NOTES
This note will not be viewable in [address] Yvette Sheikh is a 80 y.o. female and presents with ED Follow-up and Fall Kristyn Conner Subjective: 
Mrs. Vitaliy Logan presents today with complaint of a fall. She was doing something in the kitchen and next thing she knows she awakened on the floor. She had hit the back of her head and had a med alert bracelet and pressed the button. She was taken to the ER at [facility] and had a work-up including CT scan and labs. She was noted to be mildly dehydrated and have an underlying UTI. Her daughter Sedrick Jaimes notes that she is doing much better but still having some issues with short-term memory loss. Mrs. Vitaliy Logan is often repeating herself while discussing her situation with me today. She is completing a course of cephalexin for a UTI. She remains on valsartan HCT for hypertension and pravastatin 40 mg daily for carotid artery disease. She remains on 81 mg of aspirin daily. Her daughter was concerned that being on a statin would increase her memory loss. Her CT scan did not show any evidence of microvascular disease. Past Medical History:  
Diagnosis Date  AK (actinic keratosis) 7/27/2017  Arteriosclerotic coronary artery disease 7/27/2017  Arthritis of right knee 7/27/2017  Back pain 7/27/2017  Carotid atherosclerosis 7/27/2017  Cough 7/27/2017  Fatigue 7/27/2017  History of pneumonia 7/27/2017  
 HTN (hypertension) 7/27/2017  Hyperlipidemia 7/27/2017  Hypertension  Need for Streptococcus pneumoniae vaccination 7/27/2017  On statin therapy 7/27/2017  Plantar fasciitis 7/27/2017  Pneumonia  Shoulder sprain 7/27/2017  Syncope 7/27/2017 Past Surgical History:  
Procedure Laterality Date  HX GI    
 ruptured intestine No Known Allergies Current Outpatient Medications Medication Sig Dispense Refill  cephALEXin (Keflex) 500 mg capsule Take 1 Cap by mouth three (3) times daily.  15 Cap 0  
  valsartan-hydroCHLOROthiazide (DIOVAN-HCT) 160-12.5 mg per tablet Take 0.5 Tabs by mouth daily. 45 Tab 3  pravastatin (PRAVACHOL) 40 mg tablet Take 1 Tab by mouth nightly. 90 Tab 3  
 aspirin delayed-release 81 mg tablet Take  by mouth daily.  multivitamins-minerals-lutein (CENTRUM SILVER) Tab Take 1 Tab by mouth daily. Social History Socioeconomic History  Marital status: SINGLE Spouse name: Not on file  Number of children: Not on file  Years of education: Not on file  Highest education level: Not on file Tobacco Use  Smoking status: Never Smoker  Smokeless tobacco: Never Used Substance and Sexual Activity  Alcohol use: No  
 Drug use: No  
 Sexual activity: Never Family History Problem Relation Age of Onset  Hypertension Mother  Heart Disease Father Review of Systems Constitutional:  negative for fevers, chills, anorexia and weight loss Eyes:    negative for visual disturbance and irritation ENT:    negative for tinnitus,sore throat,nasal congestion,ear pains. hoarseness Respiratory:     negative for cough, hemoptysis, dyspnea,wheezing CV:    negative for chest pain, palpitations, lower extremity edema GI:    negative for nausea, vomiting, diarrhea, abdominal pain,melena Endo:               negative for polyuria,polydipsia,polyphagia,heat intolerance Genitourinary : negative for frequency, dysuria and hematuria Integumentary: negative for rash and pruritus Hematologic:   negative for easy bruising and gum/nose bleeding Musculoskel:  negative for myalgias, arthralgias, back pain, muscle weakness, joint pain Neurological:   negative for headaches, dizziness, vertigo, memory problems and gait Behavl/Psych:  negative for feelings of anxiety, depression, mood changes ROS otherwise negative Objective: 
Visit Vitals BP (!) 140/72 (BP 1 Location: Left arm, BP Patient Position: Sitting) Pulse 67 Temp 98.6 °F (37 °C) (Oral) Resp 18 Ht 5' 2\" (1.575 m) Wt 144 lb 6.4 oz (65.5 kg) SpO2 95% BMI 26.41 kg/m² Physical Exam:  
General appearance - alert, well appearing, and in no distress Mental status - alert, oriented to person, place, and time EYE-PARESH, EOMI, fundi normal, corneas normal, no foreign bodies ENT-ENT exam normal, no neck nodes or sinus tenderness Nose - normal and patent, no erythema, discharge or polyps Mouth - mucous membranes moist, pharynx normal without lesions Neck - supple, no significant adenopathy, mild right carotid bruit, no sound in the left carotid Chest - clear to auscultation, no wheezes, rales or rhonchi, symmetric air entry Heart - normal rate, regular rhythm, normal S1, S2, no murmurs, rubs, clicks or gallops Abdomen - soft, nontender, nondistended, no masses or organomegaly Lymph- no adenopathy palpable Ext-peripheral pulses normal, no pedal edema, no clubbing or cyanosis Skin-Warm and dry. no hyperpigmentation, vitiligo, or suspicious lesions Neuro -alert, oriented, normal speech, no focal findings or movement disorder noted Assessment/Plan: 
Diagnoses and all orders for this visit: 1. Encounter for support and coordination of transition of care 2. Essential hypertension -     METABOLIC PANEL, BASIC 3. Syncope, unspecified syncope type ICD-10-CM ICD-9-CM 1. Encounter for support and coordination of transition of care  Z76.89 V65.8 2. Essential hypertension  G48 720.7 METABOLIC PANEL, BASIC 3. Syncope, unspecified syncope type  R55 780.2 Follow-up and Dispositions · Return in about 1 month (around 11/21/2020) for follow up. Plan: 
 
Follow-up BMP. Of note her labs in the ER showed an elevated glucose as well as elevated BUN to creatinine ratio.   We will consider switching her pravastatin to a different statin although I explained to her daughter that being on a statin and aspirin is the mainstay of medical therapy to keep the atherosclerosis in her right carotid artery and check. It is clear that she has not been eating or getting enough nutrition on her own at home. Her daughter is now helping get her groceries and making sure that she has plenty of food at the house. I have asked her to not drive for the next month until she has a follow-up with me for reevaluation. I have reviewed with the patient details of the assessment and plan and all questions were answered. Relevent patient education was performed. Verbal and/or written instructions (see AVS) provided. The most recent lab findings were reviewed with the patient. Plan was discussed with patient who verbally expressed understanding. An After Visit Summary was printed and given to the patient.  
 
Ruma Renee MD

## 2020-10-21 NOTE — PROGRESS NOTES
Reviewed record in preparation for visit and have obtained necessary documentation. Identified pt with two pt identifiers(name and ). Chief Complaint Patient presents with  ED Follow-up Coordination of Care Questionnaire: 
:  
 
1) Have you been to an emergency room, urgent care clinic since your last visit? Yes [facility] 10/21/2020 Hospitalized since your last visit? No  
 
 
     
 
2) Have you seen or consulted any other health care providers outside of [address] [place] [address] since your last visit?  No

## 2020-10-22 LAB
BUN SERPL-MCNC: 19 MG/DL (ref 8–27)
BUN/CREAT SERPL: 21 (ref 12–28)
CALCIUM SERPL-MCNC: 9.2 MG/DL (ref 8.7–10.3)
CHLORIDE SERPL-SCNC: 98 MMOL/L (ref 96–106)
CO2 SERPL-SCNC: 26 MMOL/L (ref 20–29)
CREAT SERPL-MCNC: 0.91 MG/DL (ref 0.57–1)
GLUCOSE SERPL-MCNC: 145 MG/DL (ref 65–99)
POTASSIUM SERPL-SCNC: 4.3 MMOL/L (ref 3.5–5.2)
SODIUM SERPL-SCNC: 137 MMOL/L (ref 134–144)

## 2020-10-23 NOTE — PROGRESS NOTES
Your follow-up blood work shows that your glucose remains mildly elevated at 145. This was a nonfasting blood test and may allow for a mildly elevated glucose. Your kidney function is stable. Avoid sweets and drink plenty of fluids. I would continue all medications as prescribed. Please follow-up in 1 month for reevaluation. Please notify patient and her daughter Cindy Reap.

## 2020-10-26 NOTE — PROGRESS NOTES
Patient's daughter, Petey Adjutant informed of results. She states understanding and has no questions at this time.

## 2020-10-29 ENCOUNTER — TELEPHONE (OUTPATIENT)
Dept: INTERNAL MEDICINE CLINIC | Age: 85
End: 2020-10-29

## 2020-10-29 NOTE — TELEPHONE ENCOUNTER
Pt called and wanted to know when would be a good time to start driving again. She wanted to go to the bank and grocery store. Please advise and or call pt.

## 2020-11-23 ENCOUNTER — OFFICE VISIT (OUTPATIENT)
Dept: INTERNAL MEDICINE CLINIC | Age: 85
End: 2020-11-23
Payer: MEDICARE

## 2020-11-23 VITALS
WEIGHT: 141 LBS | RESPIRATION RATE: 18 BRPM | BODY MASS INDEX: 25.95 KG/M2 | SYSTOLIC BLOOD PRESSURE: 128 MMHG | HEIGHT: 62 IN | DIASTOLIC BLOOD PRESSURE: 68 MMHG | HEART RATE: 62 BPM | OXYGEN SATURATION: 97 % | TEMPERATURE: 97.8 F

## 2020-11-23 DIAGNOSIS — M17.11 ARTHRITIS OF RIGHT KNEE: ICD-10-CM

## 2020-11-23 DIAGNOSIS — R55 SYNCOPE, UNSPECIFIED SYNCOPE TYPE: ICD-10-CM

## 2020-11-23 DIAGNOSIS — I10 ESSENTIAL HYPERTENSION: Primary | ICD-10-CM

## 2020-11-23 DIAGNOSIS — R41.3 MEMORY LOSS: ICD-10-CM

## 2020-11-23 DIAGNOSIS — E78.00 PURE HYPERCHOLESTEROLEMIA: ICD-10-CM

## 2020-11-23 PROCEDURE — G8419 CALC BMI OUT NRM PARAM NOF/U: HCPCS | Performed by: INTERNAL MEDICINE

## 2020-11-23 PROCEDURE — 1100F PTFALLS ASSESS-DOCD GE2>/YR: CPT | Performed by: INTERNAL MEDICINE

## 2020-11-23 PROCEDURE — 99214 OFFICE O/P EST MOD 30 MIN: CPT | Performed by: INTERNAL MEDICINE

## 2020-11-23 PROCEDURE — G8432 DEP SCR NOT DOC, RNG: HCPCS | Performed by: INTERNAL MEDICINE

## 2020-11-23 PROCEDURE — G8536 NO DOC ELDER MAL SCRN: HCPCS | Performed by: INTERNAL MEDICINE

## 2020-11-23 PROCEDURE — 3288F FALL RISK ASSESSMENT DOCD: CPT | Performed by: INTERNAL MEDICINE

## 2020-11-23 PROCEDURE — G8427 DOCREV CUR MEDS BY ELIG CLIN: HCPCS | Performed by: INTERNAL MEDICINE

## 2020-11-23 PROCEDURE — 1090F PRES/ABSN URINE INCON ASSESS: CPT | Performed by: INTERNAL MEDICINE

## 2020-11-23 RX ORDER — DICLOFENAC SODIUM 10 MG/G
4 GEL TOPICAL 4 TIMES DAILY
Qty: 100 G | Refills: 1 | Status: SHIPPED | OUTPATIENT
Start: 2020-11-23 | End: 2021-01-01

## 2020-11-23 NOTE — PROGRESS NOTES
Gia Pal presents today at the clinic for Chief Complaint Patient presents with  Hypertension  
  follow up Wt Readings from Last 3 Encounters:  
11/23/20 141 lb (64 kg) 10/21/20 144 lb 6.4 oz (65.5 kg) 10/17/20 143 lb 1.3 oz (64.9 kg) Temp Readings from Last 3 Encounters:  
11/23/20 97.8 °F (36.6 °C) (Oral) 10/21/20 98.6 °F (37 °C) (Oral) 10/17/20 97.9 °F (36.6 °C) BP Readings from Last 3 Encounters:  
11/23/20 128/68  
10/21/20 (!) 140/72  
10/17/20 (!) 171/62 Pulse Readings from Last 3 Encounters:  
11/23/20 62  
10/21/20 67  
10/17/20 65 Health Maintenance Due Topic  DTaP/Tdap/Td series (1 - Tdap)  Shingrix Vaccine Age 50> (1 of 2)  GLAUCOMA SCREENING Q2Y  Bone Densitometry (Dexa) Screening  Pneumococcal 65+ years (2 of 2 - PPSV23)  Medicare Yearly Exam   
 Flu Vaccine (1) Learning Assessment: 
:  
 
Learning Assessment 7/27/2017 PRIMARY LEARNER Patient HIGHEST LEVEL OF EDUCATION - PRIMARY LEARNER  2 YEARS OF COLLEGE  
BARRIERS PRIMARY LEARNER NONE  
CO-LEARNER CAREGIVER No  
PRIMARY LANGUAGE ENGLISH  
LEARNER PREFERENCE PRIMARY DEMONSTRATION  
ANSWERED BY patient RELATIONSHIP SELF Depression Screening: 
:  
 
3 most recent PHQ Screens 2/28/2020 Little interest or pleasure in doing things Not at all Feeling down, depressed, irritable, or hopeless Not at all Total Score PHQ 2 0 Fall Risk Assessment: 
:  
 
Fall Risk Assessment, last 12 mths 10/21/2020 Able to walk? Yes Fall in past 12 months? Yes Fall with injury? Yes  
Number of falls in past 12 months 1 Fall Risk Score 2 Abuse Screening: 
:  
 
Abuse Screening Questionnaire 2/14/2019 7/27/2017 Do you ever feel afraid of your partner? Thaddeus Notice Are you in a relationship with someone who physically or mentally threatens you? Thaddeus Notice Is it safe for you to go home? [facility] Coordination of Care Questionnaire: 
:  
 
 1. Have you been to the ER, urgent care clinic since your last visit? Hospitalized since your last visit? no 
 
2. Have you seen or consulted any other health care providers outside of the [address] [place] [address] since your last visit? Include any pap smears or colon screening.  no

## 2020-11-23 NOTE — PROGRESS NOTES
This note will not be viewable in [address] Rea Waldrop is a 80 y.o. female and presents with Hypertension (follow up) Terese Lewis Subjective: 
 
Mrs. Jerry Cheung presents today for follow-up of hypertension, mild memory loss, and right knee pain. Her blood pressure stable on her current dose of valsartan HCT. She is drinking more fluids now and has not had any recurring falls. She does have a history of carotid artery atherosclerosis and remains on a statin and aspirin daily. She has some right knee discomfort but is not taking anything for this at this time. She has had a corticosteroid injection in the past with excellent results. She had been told not to drive because of her fall and near syncopal event. She has not had any recurring episodes. She does however have some mild memory loss and will further before resuming driving. Past Medical History:  
Diagnosis Date  AK (actinic keratosis) 7/27/2017  Arteriosclerotic coronary artery disease 7/27/2017  Arthritis of right knee 7/27/2017  Back pain 7/27/2017  Carotid atherosclerosis 7/27/2017  Cough 7/27/2017  Fatigue 7/27/2017  History of pneumonia 7/27/2017  
 HTN (hypertension) 7/27/2017  Hyperlipidemia 7/27/2017  Hypertension  Need for Streptococcus pneumoniae vaccination 7/27/2017  On statin therapy 7/27/2017  Plantar fasciitis 7/27/2017  Pneumonia  Shoulder sprain 7/27/2017  Syncope 7/27/2017 Past Surgical History:  
Procedure Laterality Date  HX GI    
 ruptured intestine No Known Allergies Current Outpatient Medications Medication Sig Dispense Refill  valsartan-hydroCHLOROthiazide (DIOVAN-HCT) 160-12.5 mg per tablet Take 0.5 Tabs by mouth daily. 45 Tab 3  pravastatin (PRAVACHOL) 40 mg tablet Take 1 Tab by mouth nightly. 90 Tab 3  
 aspirin delayed-release 81 mg tablet Take  by mouth daily.  multivitamins-minerals-lutein (CENTRUM SILVER) Tab Take 1 Tab by mouth daily. Social History Socioeconomic History  Marital status: SINGLE Spouse name: Not on file  Number of children: Not on file  Years of education: Not on file  Highest education level: Not on file Tobacco Use  Smoking status: Never Smoker  Smokeless tobacco: Never Used Substance and Sexual Activity  Alcohol use: No  
 Drug use: No  
 Sexual activity: Never Family History Problem Relation Age of Onset  Hypertension Mother  Heart Disease Father Review of Systems Constitutional:  negative for fevers, chills, anorexia and weight loss Eyes:    negative for visual disturbance and irritation ENT:    negative for tinnitus,sore throat,nasal congestion,ear pains. hoarseness Respiratory:     negative for cough, hemoptysis, dyspnea,wheezing CV:    negative for chest pain, palpitations, lower extremity edema GI:    negative for nausea, vomiting, diarrhea, abdominal pain,melena Endo:               negative for polyuria,polydipsia,polyphagia,heat intolerance Genitourinary : negative for frequency, dysuria and hematuria Integumentary: negative for rash and pruritus Hematologic:   negative for easy bruising and gum/nose bleeding Musculoskel:  negative for myalgias, arthralgias, back pain, muscle weakness, joint pain Neurological:   negative for headaches, dizziness, vertigo, memory problems and gait Behavl/Psych:  negative for feelings of anxiety, depression, mood changes ROS otherwise negative Objective: 
Visit Vitals /68 (BP 1 Location: Left arm, BP Patient Position: Sitting) Pulse 62 Temp 97.8 °F (36.6 °C) (Oral) Resp 18 Ht 5' 2\" (1.575 m) Wt 141 lb (64 kg) SpO2 97% BMI 25.79 kg/m² Physical Exam:  
General appearance - alert, well appearing, and in no distress Mental status - alert, oriented to person, place, and time EYE-PARESH, EOMI, fundi normal, corneas normal, no foreign bodies ENT-ENT exam normal, no neck nodes or sinus tenderness Nose - normal and patent, no erythema, discharge or polyps Mouth - mucous membranes moist, pharynx normal without lesions Neck - supple, no significant adenopathy Chest - clear to auscultation, no wheezes, rales or rhonchi, symmetric air entry Heart - normal rate, regular rhythm, normal S1, S2, no murmurs, rubs, clicks or gallops Abdomen - soft, nontender, nondistended, no masses or organomegaly Lymph- no adenopathy palpable Ext-peripheral pulses normal, no pedal edema, no clubbing or cyanosis, right knee with mild effusion compared to the left there is no calor or erythema Skin-Warm and dry. no hyperpigmentation, vitiligo, or suspicious lesions Neuro -alert, oriented, normal speech, no focal findings or movement disorder noted MMSE-able to state the day of the week, month, and year but not date, able to state 2 out of 3 objects on immediate recall and 0 out of 3 objects on short-term recall, able to subtract by serial threes without difficulty, demonstrates abstract thought without difficulty Assessment/Plan: 
Diagnoses and all orders for this visit: 1. Essential hypertension 2. Syncope, unspecified syncope type 3. Pure hypercholesterolemia 4. Arthritis of right knee 5. Memory loss 
-     REFERRAL TO PSYCHOLOGY 
-     REFERRAL TO OCCUPATIONAL THERAPY ICD-10-CM ICD-9-CM 1. Essential hypertension  I10 401.9 2. Syncope, unspecified syncope type  R55 780.2 3. Pure hypercholesterolemia  E78.00 272.0 4. Arthritis of right knee  M17.11 716.96   
5. Memory loss  R41.3 780.93 REFERRAL TO PSYCHOLOGY REFERRAL TO OCCUPATIONAL THERAPY Plan: 
 
Patient's blood pressure stable. She is not had any recurring syncopal events. I believe her symptoms were most likely related to dehydration. She does have carotid artery stenosis but remained stable on her current regimen.   I do not think this would preclude her from driving however she does demonstrate some mild short-term memory loss. She may benefit from further evaluation by clinical psychology. She may also benefit from formal evaluation by Occupational Therapy to test her ability to drive a motor vehicle. I explained to her that my concern would be that she does not recognize traffic signs or situations quickly enough to be able to respond appropriately putting her and others at risk. If she does not display any deficits she may be able to drive for short distances which is all that she plans to do. Follow-up in January for reevaluation. We will try Voltaren gel for her right knee pain secondary to arthritis. Follow-up and Dispositions · Return in about 2 months (around 1/23/2021). I have reviewed with the patient details of the assessment and plan and all questions were answered. Relevent patient education was performed. Verbal and/or written instructions (see AVS) provided. The most recent lab findings were reviewed with the patient. Plan was discussed with patient who verbally expressed understanding. An After Visit Summary was printed and given to the patient.  
 
Greg Gómez MD

## 2020-11-25 ENCOUNTER — TELEPHONE (OUTPATIENT)
Dept: NEUROLOGY | Age: 85
End: 2020-11-25

## 2020-11-25 NOTE — TELEPHONE ENCOUNTER
----- Message from Toni Yeh sent at 11/25/2020  9:29 AM EST -----  Regarding: [place]. telephone  Appointment not available    Caller's first and last name and relationship to patient (if not the patient): Duarte Barraza  contact number: [phone]      Preferred date and time: First available      Scheduled appointment date and time: n/a      Reason for appointment: NP, Memory Loss        Details to clarify the request:Patient is referred by Dr. Tha Jiang.  Please contact Ramon to schedule the appointment      Farhana Yeh

## 2020-11-30 ENCOUNTER — TELEPHONE (OUTPATIENT)
Dept: NEUROLOGY | Age: 85
End: 2020-11-30

## 2020-11-30 NOTE — TELEPHONE ENCOUNTER
----- Message from Esther Lozano sent at 11/30/2020 12:01 PM EST -----  Regarding: Dr. William Hollis  General Message/Vendor Calls    Caller's first and last name:Ramon(  [id number])      Reason for call:new pt appt      Callback required yes/no and why:yes      Best contact number(s): [phone](pt's daughter)      Details to clarify the request:Ramon requested a call to schedule a new pt appt for memory loss. Please contact the pt's daughter(Samra Law) to schedule.       Esther Lozano

## 2021-01-01 ENCOUNTER — TELEPHONE (OUTPATIENT)
Dept: NEUROLOGY | Age: 86
End: 2021-01-01

## 2021-01-01 ENCOUNTER — APPOINTMENT (OUTPATIENT)
Dept: NUCLEAR MEDICINE | Age: 86
DRG: 378 | End: 2021-01-01
Attending: INTERNAL MEDICINE
Payer: MEDICARE

## 2021-01-01 ENCOUNTER — TELEPHONE (OUTPATIENT)
Dept: INTERNAL MEDICINE CLINIC | Age: 86
End: 2021-01-01

## 2021-01-01 ENCOUNTER — PATIENT OUTREACH (OUTPATIENT)
Dept: CASE MANAGEMENT | Age: 86
End: 2021-01-01

## 2021-01-01 ENCOUNTER — OFFICE VISIT (OUTPATIENT)
Dept: NEUROLOGY | Age: 86
End: 2021-01-01
Payer: MEDICARE

## 2021-01-01 ENCOUNTER — ANESTHESIA EVENT (OUTPATIENT)
Dept: ENDOSCOPY | Age: 86
DRG: 378 | End: 2021-01-01
Payer: MEDICARE

## 2021-01-01 ENCOUNTER — APPOINTMENT (OUTPATIENT)
Dept: GENERAL RADIOLOGY | Age: 86
End: 2021-01-01
Attending: EMERGENCY MEDICINE
Payer: MEDICARE

## 2021-01-01 ENCOUNTER — OFFICE VISIT (OUTPATIENT)
Dept: INTERNAL MEDICINE CLINIC | Age: 86
End: 2021-01-01
Payer: MEDICARE

## 2021-01-01 ENCOUNTER — APPOINTMENT (OUTPATIENT)
Dept: NON INVASIVE DIAGNOSTICS | Age: 86
DRG: 378 | End: 2021-01-01
Attending: NURSE PRACTITIONER
Payer: MEDICARE

## 2021-01-01 ENCOUNTER — NURSE TRIAGE (OUTPATIENT)
Dept: OTHER | Facility: CLINIC | Age: 86
End: 2021-01-01

## 2021-01-01 ENCOUNTER — ANESTHESIA (OUTPATIENT)
Dept: ENDOSCOPY | Age: 86
DRG: 378 | End: 2021-01-01
Payer: MEDICARE

## 2021-01-01 ENCOUNTER — HOSPITAL ENCOUNTER (INPATIENT)
Age: 86
LOS: 3 days | Discharge: HOME HEALTH | DRG: 378 | End: 2021-11-19
Attending: STUDENT IN AN ORGANIZED HEALTH CARE EDUCATION/TRAINING PROGRAM | Admitting: INTERNAL MEDICINE
Payer: MEDICARE

## 2021-01-01 ENCOUNTER — HOSPITAL ENCOUNTER (EMERGENCY)
Age: 86
End: 2021-12-24
Attending: EMERGENCY MEDICINE
Payer: MEDICARE

## 2021-01-01 VITALS
HEIGHT: 61 IN | TEMPERATURE: 98.6 F | OXYGEN SATURATION: 98 % | BODY MASS INDEX: 24.84 KG/M2 | SYSTOLIC BLOOD PRESSURE: 130 MMHG | WEIGHT: 131.6 LBS | DIASTOLIC BLOOD PRESSURE: 60 MMHG | HEART RATE: 57 BPM

## 2021-01-01 VITALS
HEIGHT: 62 IN | HEART RATE: 63 BPM | WEIGHT: 143 LBS | SYSTOLIC BLOOD PRESSURE: 128 MMHG | RESPIRATION RATE: 16 BRPM | TEMPERATURE: 98.7 F | DIASTOLIC BLOOD PRESSURE: 70 MMHG | OXYGEN SATURATION: 98 % | BODY MASS INDEX: 26.31 KG/M2

## 2021-01-01 VITALS
WEIGHT: 142 LBS | RESPIRATION RATE: 16 BRPM | OXYGEN SATURATION: 95 % | HEART RATE: 65 BPM | SYSTOLIC BLOOD PRESSURE: 152 MMHG | TEMPERATURE: 97.2 F | HEIGHT: 62 IN | BODY MASS INDEX: 26.13 KG/M2 | DIASTOLIC BLOOD PRESSURE: 63 MMHG

## 2021-01-01 VITALS
HEART RATE: 74 BPM | OXYGEN SATURATION: 97 % | BODY MASS INDEX: 24.11 KG/M2 | WEIGHT: 131 LBS | RESPIRATION RATE: 18 BRPM | SYSTOLIC BLOOD PRESSURE: 108 MMHG | HEIGHT: 62 IN | DIASTOLIC BLOOD PRESSURE: 66 MMHG

## 2021-01-01 VITALS
HEART RATE: 73 BPM | BODY MASS INDEX: 23.92 KG/M2 | HEIGHT: 62 IN | RESPIRATION RATE: 18 BRPM | SYSTOLIC BLOOD PRESSURE: 122 MMHG | WEIGHT: 130 LBS | DIASTOLIC BLOOD PRESSURE: 66 MMHG

## 2021-01-01 VITALS
RESPIRATION RATE: 18 BRPM | OXYGEN SATURATION: 96 % | DIASTOLIC BLOOD PRESSURE: 47 MMHG | HEIGHT: 62 IN | WEIGHT: 137 LBS | HEART RATE: 73 BPM | BODY MASS INDEX: 25.21 KG/M2 | SYSTOLIC BLOOD PRESSURE: 122 MMHG

## 2021-01-01 VITALS
DIASTOLIC BLOOD PRESSURE: 46 MMHG | SYSTOLIC BLOOD PRESSURE: 133 MMHG | RESPIRATION RATE: 18 BRPM | OXYGEN SATURATION: 96 % | BODY MASS INDEX: 25.45 KG/M2 | TEMPERATURE: 97.5 F | HEIGHT: 61 IN | HEART RATE: 61 BPM | WEIGHT: 134.8 LBS

## 2021-01-01 DIAGNOSIS — R73.02 IGT (IMPAIRED GLUCOSE TOLERANCE): ICD-10-CM

## 2021-01-01 DIAGNOSIS — D50.9 IRON DEFICIENCY ANEMIA, UNSPECIFIED IRON DEFICIENCY ANEMIA TYPE: Primary | ICD-10-CM

## 2021-01-01 DIAGNOSIS — E78.00 PURE HYPERCHOLESTEROLEMIA: ICD-10-CM

## 2021-01-01 DIAGNOSIS — D64.9 ANEMIA, UNSPECIFIED TYPE: Primary | ICD-10-CM

## 2021-01-01 DIAGNOSIS — G30.1 LATE ONSET ALZHEIMER'S DEMENTIA WITHOUT BEHAVIORAL DISTURBANCE (HCC): ICD-10-CM

## 2021-01-01 DIAGNOSIS — I10 ESSENTIAL HYPERTENSION: Primary | ICD-10-CM

## 2021-01-01 DIAGNOSIS — K92.2 GASTROINTESTINAL HEMORRHAGE, UNSPECIFIED GASTROINTESTINAL HEMORRHAGE TYPE: Primary | ICD-10-CM

## 2021-01-01 DIAGNOSIS — F02.80 LATE ONSET ALZHEIMER'S DEMENTIA WITHOUT BEHAVIORAL DISTURBANCE (HCC): ICD-10-CM

## 2021-01-01 DIAGNOSIS — H91.93 BILATERAL HEARING LOSS, UNSPECIFIED HEARING LOSS TYPE: Primary | ICD-10-CM

## 2021-01-01 DIAGNOSIS — R41.3 MEMORY LOSS: ICD-10-CM

## 2021-01-01 DIAGNOSIS — I46.9 CARDIAC ARREST (HCC): Primary | ICD-10-CM

## 2021-01-01 DIAGNOSIS — Z79.899 ON STATIN THERAPY: ICD-10-CM

## 2021-01-01 DIAGNOSIS — E53.8 B12 DEFICIENCY: ICD-10-CM

## 2021-01-01 DIAGNOSIS — J96.01 ACUTE RESPIRATORY FAILURE WITH HYPOXIA (HCC): ICD-10-CM

## 2021-01-01 DIAGNOSIS — G30.9 DEMENTIA DUE TO ALZHEIMER'S DISEASE (HCC): Primary | ICD-10-CM

## 2021-01-01 DIAGNOSIS — R63.4 WEIGHT LOSS: ICD-10-CM

## 2021-01-01 DIAGNOSIS — R73.09 ELEVATED GLUCOSE: ICD-10-CM

## 2021-01-01 DIAGNOSIS — F02.80 DEMENTIA DUE TO ALZHEIMER'S DISEASE (HCC): Primary | ICD-10-CM

## 2021-01-01 DIAGNOSIS — I27.20 PULMONARY HYPERTENSION, MODERATE TO SEVERE (HCC): ICD-10-CM

## 2021-01-01 DIAGNOSIS — I25.10 ARTERIOSCLEROTIC CORONARY ARTERY DISEASE: ICD-10-CM

## 2021-01-01 DIAGNOSIS — I65.23 ATHEROSCLEROSIS OF BOTH CAROTID ARTERIES: ICD-10-CM

## 2021-01-01 DIAGNOSIS — G30.1 LATE ONSET ALZHEIMER'S DISEASE WITHOUT BEHAVIORAL DISTURBANCE (HCC): Primary | ICD-10-CM

## 2021-01-01 DIAGNOSIS — I10 ESSENTIAL HYPERTENSION: ICD-10-CM

## 2021-01-01 DIAGNOSIS — R10.84 GENERALIZED ABDOMINAL PAIN: Primary | ICD-10-CM

## 2021-01-01 DIAGNOSIS — Z00.00 MEDICARE ANNUAL WELLNESS VISIT, SUBSEQUENT: Primary | ICD-10-CM

## 2021-01-01 DIAGNOSIS — F02.80 LATE ONSET ALZHEIMER'S DISEASE WITHOUT BEHAVIORAL DISTURBANCE (HCC): Primary | ICD-10-CM

## 2021-01-01 DIAGNOSIS — D50.9 IRON DEFICIENCY ANEMIA, UNSPECIFIED IRON DEFICIENCY ANEMIA TYPE: ICD-10-CM

## 2021-01-01 DIAGNOSIS — R53.83 FATIGUE, UNSPECIFIED TYPE: ICD-10-CM

## 2021-01-01 DIAGNOSIS — K25.9 GASTRIC EROSION, UNSPECIFIED CHRONICITY: ICD-10-CM

## 2021-01-01 DIAGNOSIS — Z13.31 SCREENING FOR DEPRESSION: ICD-10-CM

## 2021-01-01 DIAGNOSIS — I10 PRIMARY HYPERTENSION: ICD-10-CM

## 2021-01-01 LAB
ABO + RH BLD: NORMAL
ALBUMIN SERPL-MCNC: 3.1 G/DL (ref 3.5–5)
ALBUMIN SERPL-MCNC: 4.2 G/DL (ref 3.6–4.6)
ALBUMIN SERPL-MCNC: 4.4 G/DL (ref 3.6–4.6)
ALBUMIN/GLOB SERPL: 0.9 {RATIO} (ref 1.1–2.2)
ALBUMIN/GLOB SERPL: 1.7 {RATIO} (ref 1.2–2.2)
ALBUMIN/GLOB SERPL: 1.8 {RATIO} (ref 1.2–2.2)
ALP SERPL-CCNC: 101 IU/L (ref 44–121)
ALP SERPL-CCNC: 84 U/L (ref 45–117)
ALP SERPL-CCNC: 98 IU/L (ref 48–121)
ALT SERPL-CCNC: 13 IU/L (ref 0–32)
ALT SERPL-CCNC: 14 U/L (ref 12–78)
ALT SERPL-CCNC: 9 IU/L (ref 0–32)
ANION GAP SERPL CALC-SCNC: 8 MMOL/L (ref 5–15)
ANION GAP SERPL CALC-SCNC: 8 MMOL/L (ref 5–15)
APPEARANCE UR: CLEAR
AST SERPL-CCNC: 10 U/L (ref 15–37)
AST SERPL-CCNC: 17 IU/L (ref 0–40)
AST SERPL-CCNC: 27 IU/L (ref 0–40)
ATRIAL RATE: 66 BPM
BACTERIA #/AREA URNS HPF: NORMAL /[HPF]
BASOPHILS # BLD AUTO: 0 X10E3/UL (ref 0–0.2)
BASOPHILS # BLD: 0 K/UL (ref 0–0.1)
BASOPHILS NFR BLD AUTO: 0 %
BASOPHILS NFR BLD AUTO: 1 %
BASOPHILS NFR BLD: 0 % (ref 0–1)
BILIRUB SERPL-MCNC: 0.2 MG/DL (ref 0.2–1)
BILIRUB SERPL-MCNC: 0.3 MG/DL (ref 0–1.2)
BILIRUB SERPL-MCNC: 0.3 MG/DL (ref 0–1.2)
BILIRUB UR QL STRIP: NEGATIVE
BLD PROD TYP BPU: NORMAL
BLOOD GROUP ANTIBODIES SERPL: NORMAL
BNP SERPL-MCNC: 702 PG/ML
BPU ID: NORMAL
BUN SERPL-MCNC: 16 MG/DL (ref 8–27)
BUN SERPL-MCNC: 17 MG/DL (ref 8–27)
BUN SERPL-MCNC: 19 MG/DL (ref 6–20)
BUN SERPL-MCNC: 22 MG/DL (ref 8–27)
BUN SERPL-MCNC: 24 MG/DL (ref 6–20)
BUN/CREAT SERPL: 16 (ref 12–28)
BUN/CREAT SERPL: 18 (ref 12–28)
BUN/CREAT SERPL: 23 (ref 12–20)
BUN/CREAT SERPL: 24 (ref 12–20)
BUN/CREAT SERPL: 26 (ref 12–28)
CALCIUM SERPL-MCNC: 8.8 MG/DL (ref 8.5–10.1)
CALCIUM SERPL-MCNC: 8.9 MG/DL (ref 8.5–10.1)
CALCIUM SERPL-MCNC: 9.1 MG/DL (ref 8.7–10.3)
CALCIUM SERPL-MCNC: 9.4 MG/DL (ref 8.7–10.3)
CALCIUM SERPL-MCNC: 9.4 MG/DL (ref 8.7–10.3)
CALCULATED P AXIS, ECG09: 74 DEGREES
CALCULATED R AXIS, ECG10: 2 DEGREES
CALCULATED R AXIS, ECG10: 3 DEGREES
CALCULATED T AXIS, ECG11: 157 DEGREES
CALCULATED T AXIS, ECG11: 39 DEGREES
CASTS URNS QL MICRO: NORMAL /LPF
CHLORIDE SERPL-SCNC: 102 MMOL/L (ref 96–106)
CHLORIDE SERPL-SCNC: 102 MMOL/L (ref 96–106)
CHLORIDE SERPL-SCNC: 102 MMOL/L (ref 97–108)
CHLORIDE SERPL-SCNC: 106 MMOL/L (ref 97–108)
CHLORIDE SERPL-SCNC: 97 MMOL/L (ref 96–106)
CHOLEST SERPL-MCNC: 201 MG/DL (ref 100–199)
CO2 SERPL-SCNC: 19 MMOL/L (ref 20–29)
CO2 SERPL-SCNC: 24 MMOL/L (ref 21–32)
CO2 SERPL-SCNC: 25 MMOL/L (ref 20–29)
CO2 SERPL-SCNC: 27 MMOL/L (ref 21–32)
CO2 SERPL-SCNC: 28 MMOL/L (ref 20–29)
COLOR UR: YELLOW
COVID-19 RAPID TEST, COVR: NOT DETECTED
CREAT SERPL-MCNC: 0.83 MG/DL (ref 0.55–1.02)
CREAT SERPL-MCNC: 0.86 MG/DL (ref 0.57–1)
CREAT SERPL-MCNC: 0.95 MG/DL (ref 0.57–1)
CREAT SERPL-MCNC: 0.98 MG/DL (ref 0.57–1)
CREAT SERPL-MCNC: 1.01 MG/DL (ref 0.55–1.02)
CROSSMATCH RESULT,%XM: NORMAL
DIAGNOSIS, 93000: NORMAL
DIAGNOSIS, 93000: NORMAL
DIFFERENTIAL METHOD BLD: ABNORMAL
ECHO AO ROOT DIAM: 3.11 CM
ECHO AV AREA PEAK VELOCITY: 2.14 CM2
ECHO AV AREA PEAK VELOCITY: 2.14 CM2
ECHO AV AREA PEAK VELOCITY: 2.17 CM2
ECHO AV AREA PEAK VELOCITY: 2.17 CM2
ECHO AV AREA VTI: 2.2 CM2
ECHO AV AREA/BSA VTI: 1.4 CM2/M2
ECHO AV MEAN GRADIENT: 11.01 MMHG
ECHO AV PEAK GRADIENT: 18.39 MMHG
ECHO AV PEAK GRADIENT: 18.39 MMHG
ECHO AV PEAK VELOCITY: 214.41 CM/S
ECHO AV PEAK VELOCITY: 214.41 CM/S
ECHO AV VTI: 39.97 CM
ECHO LA MAJOR AXIS: 4.42 CM
ECHO LA MINOR AXIS: 2.8 CM
ECHO LV E' LATERAL VELOCITY: 6.46 CM/S
ECHO LV E' SEPTAL VELOCITY: 4.31 CM/S
ECHO LV INTERNAL DIMENSION DIASTOLIC: 2.71 CM (ref 3.9–5.3)
ECHO LV INTERNAL DIMENSION SYSTOLIC: 1.86 CM
ECHO LV IVSD: 1.64 CM (ref 0.6–0.9)
ECHO LV IVSS: 2.38 CM
ECHO LV MASS 2D: 197.1 G (ref 67–162)
ECHO LV MASS INDEX 2D: 124.7 G/M2 (ref 43–95)
ECHO LV POSTERIOR WALL DIASTOLIC: 2 CM (ref 0.6–0.9)
ECHO LV POSTERIOR WALL SYSTOLIC: 2.14 CM
ECHO LVOT DIAM: 2.2 CM
ECHO LVOT PEAK GRADIENT: 5.83 MMHG
ECHO LVOT PEAK GRADIENT: 6 MMHG
ECHO LVOT PEAK VELOCITY: 120.72 CM/S
ECHO LVOT PEAK VELOCITY: 122.48 CM/S
ECHO LVOT SV: 87.9 ML
ECHO LVOT VTI: 23.16 CM
ECHO MV A VELOCITY: 82.41 CM/S
ECHO MV E DECELERATION TIME (DT): 311.49 MS
ECHO MV E VELOCITY: 86.21 CM/S
ECHO MV E/A RATIO: 1.05
ECHO MV E/E' LATERAL: 13.35
ECHO MV E/E' RATIO (AVERAGED): 16.67
ECHO MV E/E' SEPTAL: 20
ECHO PV MAX VELOCITY: 110.45 CM/S
ECHO PV PEAK INSTANTANEOUS GRADIENT SYSTOLIC: 4.88 MMHG
ECHO RV INTERNAL DIMENSION: 3.59 CM
ECHO TV REGURGITANT MAX VELOCITY: 240.69 CM/S
ECHO TV REGURGITANT PEAK GRADIENT: 23.2 MMHG
EOSINOPHIL # BLD AUTO: 0.1 X10E3/UL (ref 0–0.4)
EOSINOPHIL # BLD: 0.1 K/UL (ref 0–0.4)
EOSINOPHIL # BLD: 0.1 K/UL (ref 0–0.4)
EOSINOPHIL # BLD: 0.2 K/UL (ref 0–0.4)
EOSINOPHIL NFR BLD AUTO: 1 %
EOSINOPHIL NFR BLD AUTO: 2 %
EOSINOPHIL NFR BLD: 1 % (ref 0–7)
EOSINOPHIL NFR BLD: 1 % (ref 0–7)
EOSINOPHIL NFR BLD: 3 % (ref 0–7)
EPI CELLS #/AREA URNS HPF: NORMAL /HPF (ref 0–10)
ERYTHROCYTE [DISTWIDTH] IN BLOOD BY AUTOMATED COUNT: 13.8 % (ref 11.7–15.4)
ERYTHROCYTE [DISTWIDTH] IN BLOOD BY AUTOMATED COUNT: 14.7 % (ref 11.7–15.4)
ERYTHROCYTE [DISTWIDTH] IN BLOOD BY AUTOMATED COUNT: 15.1 % (ref 11.7–15.4)
ERYTHROCYTE [DISTWIDTH] IN BLOOD BY AUTOMATED COUNT: 15.8 % (ref 11.5–14.5)
ERYTHROCYTE [DISTWIDTH] IN BLOOD BY AUTOMATED COUNT: 16 % (ref 11.5–14.5)
ERYTHROCYTE [DISTWIDTH] IN BLOOD BY AUTOMATED COUNT: 16.4 % (ref 11.5–14.5)
ERYTHROCYTE [DISTWIDTH] IN BLOOD BY AUTOMATED COUNT: 16.6 % (ref 11.5–14.5)
ERYTHROCYTE [DISTWIDTH] IN BLOOD BY AUTOMATED COUNT: 19.1 % (ref 11.7–15.4)
EST. AVERAGE GLUCOSE BLD GHB EST-MCNC: 134 MG/DL
FERRITIN SERPL-MCNC: 11 NG/ML (ref 15–150)
FERRITIN SERPL-MCNC: 75 NG/ML (ref 8–252)
FOLATE SERPL-MCNC: 57.5 NG/ML (ref 5–21)
FOLATE SERPL-MCNC: >20 NG/ML
GLOBULIN SER CALC-MCNC: 2.4 G/DL (ref 1.5–4.5)
GLOBULIN SER CALC-MCNC: 2.6 G/DL (ref 1.5–4.5)
GLOBULIN SER CALC-MCNC: 3.6 G/DL (ref 2–4)
GLUCOSE SERPL-MCNC: 122 MG/DL (ref 65–99)
GLUCOSE SERPL-MCNC: 134 MG/DL (ref 65–100)
GLUCOSE SERPL-MCNC: 150 MG/DL (ref 65–99)
GLUCOSE SERPL-MCNC: 195 MG/DL (ref 65–99)
GLUCOSE SERPL-MCNC: 253 MG/DL (ref 65–100)
GLUCOSE UR QL: NEGATIVE
H PYLORI FROM TISSUE: NEGATIVE
HBA1C MFR BLD: 6.3 % (ref 4.8–5.6)
HCT VFR BLD AUTO: 24.3 % (ref 35–47)
HCT VFR BLD AUTO: 25.9 % (ref 34–46.6)
HCT VFR BLD AUTO: 26.1 % (ref 34–46.6)
HCT VFR BLD AUTO: 27.6 % (ref 35–47)
HCT VFR BLD AUTO: 27.8 % (ref 35–47)
HCT VFR BLD AUTO: 28.5 % (ref 35–47)
HCT VFR BLD AUTO: 30.7 % (ref 35–47)
HCT VFR BLD AUTO: 31.3 % (ref 35–47)
HCT VFR BLD AUTO: 31.5 % (ref 34–46.6)
HCT VFR BLD AUTO: 32.3 % (ref 34–46.6)
HDLC SERPL-MCNC: 70 MG/DL
HEMOCCULT STL QL: POSITIVE
HGB BLD-MCNC: 10 G/DL (ref 11.1–15.9)
HGB BLD-MCNC: 7.2 G/DL (ref 11.5–16)
HGB BLD-MCNC: 7.5 G/DL (ref 11.1–15.9)
HGB BLD-MCNC: 8.2 G/DL (ref 11.1–15.9)
HGB BLD-MCNC: 8.5 G/DL (ref 11.5–16)
HGB BLD-MCNC: 8.6 G/DL (ref 11.5–16)
HGB BLD-MCNC: 8.8 G/DL (ref 11.5–16)
HGB BLD-MCNC: 9.3 G/DL (ref 11.5–16)
HGB BLD-MCNC: 9.5 G/DL (ref 11.5–16)
HGB BLD-MCNC: 9.9 G/DL (ref 11.1–15.9)
HGB UR QL STRIP: NEGATIVE
HISTORY CHECKED?,CKHIST: NORMAL
IMM GRANULOCYTES # BLD AUTO: 0 K/UL (ref 0–0.04)
IMM GRANULOCYTES # BLD AUTO: 0 K/UL (ref 0–0.04)
IMM GRANULOCYTES # BLD AUTO: 0 X10E3/UL (ref 0–0.1)
IMM GRANULOCYTES # BLD AUTO: 0.1 K/UL (ref 0–0.04)
IMM GRANULOCYTES NFR BLD AUTO: 0 %
IMM GRANULOCYTES NFR BLD AUTO: 0 % (ref 0–0.5)
IMM GRANULOCYTES NFR BLD AUTO: 0 % (ref 0–0.5)
IMM GRANULOCYTES NFR BLD AUTO: 1 %
IMM GRANULOCYTES NFR BLD AUTO: 1 % (ref 0–0.5)
IRON SATN MFR SERPL: 89 % (ref 20–50)
IRON SERPL-MCNC: 291 UG/DL (ref 35–150)
KETONES UR QL STRIP: NEGATIVE
KIT LOT NO., HCLOLOT: NORMAL
LDLC SERPL CALC-MCNC: 108 MG/DL (ref 0–99)
LEUKOCYTE ESTERASE UR QL STRIP: NEGATIVE
LIPASE SERPL-CCNC: 10 U/L (ref 14–85)
LVOT MG: 3.94 MMHG
LYMPHOCYTES # BLD AUTO: 1 X10E3/UL (ref 0.7–3.1)
LYMPHOCYTES # BLD AUTO: 1.1 X10E3/UL (ref 0.7–3.1)
LYMPHOCYTES # BLD AUTO: 1.3 X10E3/UL (ref 0.7–3.1)
LYMPHOCYTES # BLD AUTO: 1.6 X10E3/UL (ref 0.7–3.1)
LYMPHOCYTES # BLD: 1 K/UL (ref 0.8–3.5)
LYMPHOCYTES # BLD: 1.1 K/UL (ref 0.8–3.5)
LYMPHOCYTES # BLD: 1.3 K/UL (ref 0.8–3.5)
LYMPHOCYTES NFR BLD AUTO: 10 %
LYMPHOCYTES NFR BLD AUTO: 15 %
LYMPHOCYTES NFR BLD AUTO: 15 %
LYMPHOCYTES NFR BLD AUTO: 28 %
LYMPHOCYTES NFR BLD: 14 % (ref 12–49)
LYMPHOCYTES NFR BLD: 15 % (ref 12–49)
LYMPHOCYTES NFR BLD: 15 % (ref 12–49)
MCH RBC QN AUTO: 24.4 PG (ref 26–34)
MCH RBC QN AUTO: 24.8 PG (ref 26.6–33)
MCH RBC QN AUTO: 25.5 PG (ref 26.6–33)
MCH RBC QN AUTO: 26 PG (ref 26–34)
MCH RBC QN AUTO: 26 PG (ref 26–34)
MCH RBC QN AUTO: 26.1 PG (ref 26–34)
MCH RBC QN AUTO: 27.2 PG (ref 26.6–33)
MCH RBC QN AUTO: 28.2 PG (ref 26.6–33)
MCHC RBC AUTO-ENTMCNC: 29 G/DL (ref 31.5–35.7)
MCHC RBC AUTO-ENTMCNC: 29.6 G/DL (ref 30–36.5)
MCHC RBC AUTO-ENTMCNC: 30.4 G/DL (ref 30–36.5)
MCHC RBC AUTO-ENTMCNC: 30.8 G/DL (ref 30–36.5)
MCHC RBC AUTO-ENTMCNC: 30.9 G/DL (ref 30–36.5)
MCHC RBC AUTO-ENTMCNC: 31 G/DL (ref 31.5–35.7)
MCHC RBC AUTO-ENTMCNC: 31.4 G/DL (ref 31.5–35.7)
MCHC RBC AUTO-ENTMCNC: 31.4 G/DL (ref 31.5–35.7)
MCV RBC AUTO: 81 FL (ref 79–97)
MCV RBC AUTO: 82.4 FL (ref 80–99)
MCV RBC AUTO: 84.3 FL (ref 80–99)
MCV RBC AUTO: 84.7 FL (ref 80–99)
MCV RBC AUTO: 85.5 FL (ref 80–99)
MCV RBC AUTO: 86 FL (ref 79–97)
MCV RBC AUTO: 87 FL (ref 79–97)
MCV RBC AUTO: 91 FL (ref 79–97)
MICRO URNS: ABNORMAL
MICRO URNS: ABNORMAL
MONOCYTES # BLD AUTO: 0.5 X10E3/UL (ref 0.1–0.9)
MONOCYTES # BLD AUTO: 0.6 X10E3/UL (ref 0.1–0.9)
MONOCYTES # BLD AUTO: 0.8 X10E3/UL (ref 0.1–0.9)
MONOCYTES # BLD AUTO: 0.9 X10E3/UL (ref 0.1–0.9)
MONOCYTES # BLD: 0.5 K/UL (ref 0–1)
MONOCYTES # BLD: 0.9 K/UL (ref 0–1)
MONOCYTES # BLD: 0.9 K/UL (ref 0–1)
MONOCYTES NFR BLD AUTO: 11 %
MONOCYTES NFR BLD AUTO: 8 %
MONOCYTES NFR BLD AUTO: 9 %
MONOCYTES NFR BLD AUTO: 9 %
MONOCYTES NFR BLD: 10 % (ref 5–13)
MONOCYTES NFR BLD: 12 % (ref 5–13)
MONOCYTES NFR BLD: 7 % (ref 5–13)
NEGATIVE CONTROL: NEGATIVE
NEUTROPHILS # BLD AUTO: 3.4 X10E3/UL (ref 1.4–7)
NEUTROPHILS # BLD AUTO: 5.5 X10E3/UL (ref 1.4–7)
NEUTROPHILS # BLD AUTO: 6.5 X10E3/UL (ref 1.4–7)
NEUTROPHILS # BLD AUTO: 7.3 X10E3/UL (ref 1.4–7)
NEUTROPHILS NFR BLD AUTO: 60 %
NEUTROPHILS NFR BLD AUTO: 72 %
NEUTROPHILS NFR BLD AUTO: 76 %
NEUTROPHILS NFR BLD AUTO: 80 %
NEUTS SEG # BLD: 5 K/UL (ref 1.8–8)
NEUTS SEG # BLD: 5.5 K/UL (ref 1.8–8)
NEUTS SEG # BLD: 6.3 K/UL (ref 1.8–8)
NEUTS SEG NFR BLD: 69 % (ref 32–75)
NEUTS SEG NFR BLD: 73 % (ref 32–75)
NEUTS SEG NFR BLD: 78 % (ref 32–75)
NITRITE UR QL STRIP: NEGATIVE
NRBC # BLD: 0 K/UL (ref 0–0.01)
NRBC BLD-RTO: 0 PER 100 WBC
P-R INTERVAL, ECG05: 172 MS
PH UR STRIP: 7 [PH] (ref 5–7.5)
PLATELET # BLD AUTO: 235 K/UL (ref 150–400)
PLATELET # BLD AUTO: 241 K/UL (ref 150–400)
PLATELET # BLD AUTO: 244 K/UL (ref 150–400)
PLATELET # BLD AUTO: 258 X10E3/UL (ref 150–450)
PLATELET # BLD AUTO: 271 X10E3/UL (ref 150–450)
PLATELET # BLD AUTO: 309 K/UL (ref 150–400)
PLATELET # BLD AUTO: 325 X10E3/UL (ref 150–450)
PLATELET # BLD AUTO: 329 X10E3/UL (ref 150–450)
PMV BLD AUTO: 9.5 FL (ref 8.9–12.9)
PMV BLD AUTO: 9.7 FL (ref 8.9–12.9)
PMV BLD AUTO: 9.7 FL (ref 8.9–12.9)
POSITIVE CONTROL: POSITIVE
POTASSIUM SERPL-SCNC: 3.7 MMOL/L (ref 3.5–5.1)
POTASSIUM SERPL-SCNC: 3.9 MMOL/L (ref 3.5–5.1)
POTASSIUM SERPL-SCNC: 4.3 MMOL/L (ref 3.5–5.2)
POTASSIUM SERPL-SCNC: 4.5 MMOL/L (ref 3.5–5.2)
POTASSIUM SERPL-SCNC: 5.2 MMOL/L (ref 3.5–5.2)
PROT SERPL-MCNC: 6.6 G/DL (ref 6–8.5)
PROT SERPL-MCNC: 6.7 G/DL (ref 6.4–8.2)
PROT SERPL-MCNC: 7 G/DL (ref 6–8.5)
PROT UR QL STRIP: NEGATIVE
Q-T INTERVAL, ECG07: 312 MS
Q-T INTERVAL, ECG07: 406 MS
QRS DURATION, ECG06: 106 MS
QRS DURATION, ECG06: 110 MS
QTC CALCULATION (BEZET), ECG08: 420 MS
QTC CALCULATION (BEZET), ECG08: 425 MS
RBC # BLD AUTO: 2.95 M/UL (ref 3.8–5.2)
RBC # BLD AUTO: 3.03 X10E6/UL (ref 3.77–5.28)
RBC # BLD AUTO: 3.22 X10E6/UL (ref 3.77–5.28)
RBC # BLD AUTO: 3.26 M/UL (ref 3.8–5.2)
RBC # BLD AUTO: 3.38 M/UL (ref 3.8–5.2)
RBC # BLD AUTO: 3.54 X10E6/UL (ref 3.77–5.28)
RBC # BLD AUTO: 3.64 X10E6/UL (ref 3.77–5.28)
RBC # BLD AUTO: 3.66 M/UL (ref 3.8–5.2)
RBC #/AREA URNS HPF: NORMAL /HPF (ref 0–2)
RETICS/RBC NFR AUTO: 3.1 % (ref 0.6–2.6)
SODIUM SERPL-SCNC: 136 MMOL/L (ref 134–144)
SODIUM SERPL-SCNC: 137 MMOL/L (ref 136–145)
SODIUM SERPL-SCNC: 138 MMOL/L (ref 136–145)
SODIUM SERPL-SCNC: 140 MMOL/L (ref 134–144)
SODIUM SERPL-SCNC: 141 MMOL/L (ref 134–144)
SOURCE, COVRS: NORMAL
SP GR UR: <=1.005 (ref 1–1.03)
SPECIMEN EXP DATE BLD: NORMAL
STATUS OF UNIT,%ST: NORMAL
STRESS TARGET HR: 131 BPM
TIBC SERPL-MCNC: 327 UG/DL (ref 250–450)
TRIGL SERPL-MCNC: 135 MG/DL (ref 0–149)
TROPONIN-HIGH SENSITIVITY: 42 NG/L (ref 0–51)
TROPONIN-HIGH SENSITIVITY: 74 NG/L (ref 0–51)
TROPONIN-HIGH SENSITIVITY: 85 NG/L (ref 0–51)
TSH SERPL DL<=0.005 MIU/L-ACNC: 3.06 UIU/ML (ref 0.45–4.5)
TSH SERPL DL<=0.05 MIU/L-ACNC: 2.79 UIU/ML (ref 0.36–3.74)
UNIT DIVISION, %UDIV: 0
URINALYSIS REFLEX, 377202: ABNORMAL
UROBILINOGEN UR STRIP-MCNC: 0.2 MG/DL (ref 0.2–1)
VENTRICULAR RATE, ECG03: 109 BPM
VENTRICULAR RATE, ECG03: 66 BPM
VIT B12 SERPL-MCNC: 549 PG/ML (ref 193–986)
VIT B12 SERPL-MCNC: 594 PG/ML (ref 232–1245)
VLDLC SERPL CALC-MCNC: 23 MG/DL (ref 5–40)
WBC # BLD AUTO: 5.7 X10E3/UL (ref 3.4–10.8)
WBC # BLD AUTO: 6.5 K/UL (ref 3.6–11)
WBC # BLD AUTO: 7.1 K/UL (ref 3.6–11)
WBC # BLD AUTO: 7.2 K/UL (ref 3.6–11)
WBC # BLD AUTO: 7.4 X10E3/UL (ref 3.4–10.8)
WBC # BLD AUTO: 8.7 K/UL (ref 3.6–11)
WBC # BLD AUTO: 8.9 X10E3/UL (ref 3.4–10.8)
WBC # BLD AUTO: 9.3 X10E3/UL (ref 3.4–10.8)
WBC #/AREA URNS HPF: NORMAL /HPF (ref 0–5)

## 2021-01-01 PROCEDURE — 36415 COLL VENOUS BLD VENIPUNCTURE: CPT

## 2021-01-01 PROCEDURE — 97530 THERAPEUTIC ACTIVITIES: CPT | Performed by: OCCUPATIONAL THERAPIST

## 2021-01-01 PROCEDURE — 96133 NRPSYC TST EVAL PHYS/QHP EA: CPT | Performed by: PSYCHOLOGIST

## 2021-01-01 PROCEDURE — 82728 ASSAY OF FERRITIN: CPT

## 2021-01-01 PROCEDURE — 96139 PSYCL/NRPSYC TST TECH EA: CPT | Performed by: PSYCHOLOGIST

## 2021-01-01 PROCEDURE — 83540 ASSAY OF IRON: CPT

## 2021-01-01 PROCEDURE — 1090F PRES/ABSN URINE INCON ASSESS: CPT | Performed by: INTERNAL MEDICINE

## 2021-01-01 PROCEDURE — G8536 NO DOC ELDER MAL SCRN: HCPCS | Performed by: PSYCHOLOGIST

## 2021-01-01 PROCEDURE — 96136 PSYCL/NRPSYC TST PHY/QHP 1ST: CPT | Performed by: PSYCHOLOGIST

## 2021-01-01 PROCEDURE — 83880 ASSAY OF NATRIURETIC PEPTIDE: CPT

## 2021-01-01 PROCEDURE — 92950 HEART/LUNG RESUSCITATION CPR: CPT

## 2021-01-01 PROCEDURE — G8510 SCR DEP NEG, NO PLAN REQD: HCPCS | Performed by: INTERNAL MEDICINE

## 2021-01-01 PROCEDURE — 93306 TTE W/DOPPLER COMPLETE: CPT | Performed by: INTERNAL MEDICINE

## 2021-01-01 PROCEDURE — G8432 DEP SCR NOT DOC, RNG: HCPCS | Performed by: PSYCHOLOGIST

## 2021-01-01 PROCEDURE — G8536 NO DOC ELDER MAL SCRN: HCPCS | Performed by: INTERNAL MEDICINE

## 2021-01-01 PROCEDURE — G8427 DOCREV CUR MEDS BY ELIG CLIN: HCPCS | Performed by: INTERNAL MEDICINE

## 2021-01-01 PROCEDURE — 86923 COMPATIBILITY TEST ELECTRIC: CPT

## 2021-01-01 PROCEDURE — 86901 BLOOD TYPING SEROLOGIC RH(D): CPT

## 2021-01-01 PROCEDURE — 74011250637 HC RX REV CODE- 250/637: Performed by: INTERNAL MEDICINE

## 2021-01-01 PROCEDURE — 0DB68ZX EXCISION OF STOMACH, VIA NATURAL OR ARTIFICIAL OPENING ENDOSCOPIC, DIAGNOSTIC: ICD-10-PCS | Performed by: SPECIALIST

## 2021-01-01 PROCEDURE — 36430 TRANSFUSION BLD/BLD COMPNT: CPT

## 2021-01-01 PROCEDURE — G8419 CALC BMI OUT NRM PARAM NOF/U: HCPCS | Performed by: INTERNAL MEDICINE

## 2021-01-01 PROCEDURE — 85025 COMPLETE CBC W/AUTO DIFF WBC: CPT

## 2021-01-01 PROCEDURE — G8432 DEP SCR NOT DOC, RNG: HCPCS | Performed by: INTERNAL MEDICINE

## 2021-01-01 PROCEDURE — 77030019988 HC FCPS ENDOSC DISP BSC -B: Performed by: SPECIALIST

## 2021-01-01 PROCEDURE — 96116 NUBHVL XM PHYS/QHP 1ST HR: CPT | Performed by: PSYCHOLOGIST

## 2021-01-01 PROCEDURE — G8420 CALC BMI NORM PARAMETERS: HCPCS | Performed by: INTERNAL MEDICINE

## 2021-01-01 PROCEDURE — G8419 CALC BMI OUT NRM PARAM NOF/U: HCPCS | Performed by: PSYCHOLOGIST

## 2021-01-01 PROCEDURE — 1111F DSCHRG MED/CURRENT MED MERGE: CPT | Performed by: INTERNAL MEDICINE

## 2021-01-01 PROCEDURE — 99233 SBSQ HOSP IP/OBS HIGH 50: CPT | Performed by: INTERNAL MEDICINE

## 2021-01-01 PROCEDURE — 97116 GAIT TRAINING THERAPY: CPT | Performed by: PHYSICAL THERAPIST

## 2021-01-01 PROCEDURE — 65660000001 HC RM ICU INTERMED STEPDOWN

## 2021-01-01 PROCEDURE — 1101F PT FALLS ASSESS-DOCD LE1/YR: CPT | Performed by: INTERNAL MEDICINE

## 2021-01-01 PROCEDURE — 90686 IIV4 VACC NO PRSV 0.5 ML IM: CPT | Performed by: STUDENT IN AN ORGANIZED HEALTH CARE EDUCATION/TRAINING PROGRAM

## 2021-01-01 PROCEDURE — 90471 IMMUNIZATION ADMIN: CPT

## 2021-01-01 PROCEDURE — 0DB78ZX EXCISION OF STOMACH, PYLORUS, VIA NATURAL OR ARTIFICIAL OPENING ENDOSCOPIC, DIAGNOSTIC: ICD-10-PCS | Performed by: SPECIALIST

## 2021-01-01 PROCEDURE — 99223 1ST HOSP IP/OBS HIGH 75: CPT | Performed by: INTERNAL MEDICINE

## 2021-01-01 PROCEDURE — 99283 EMERGENCY DEPT VISIT LOW MDM: CPT

## 2021-01-01 PROCEDURE — 76060000031 HC ANESTHESIA FIRST 0.5 HR: Performed by: SPECIALIST

## 2021-01-01 PROCEDURE — 97530 THERAPEUTIC ACTIVITIES: CPT | Performed by: PHYSICAL THERAPIST

## 2021-01-01 PROCEDURE — 2709999900 HC NON-CHARGEABLE SUPPLY: Performed by: SPECIALIST

## 2021-01-01 PROCEDURE — 74011250636 HC RX REV CODE- 250/636: Performed by: STUDENT IN AN ORGANIZED HEALTH CARE EDUCATION/TRAINING PROGRAM

## 2021-01-01 PROCEDURE — 99214 OFFICE O/P EST MOD 30 MIN: CPT | Performed by: INTERNAL MEDICINE

## 2021-01-01 PROCEDURE — 84443 ASSAY THYROID STIM HORMONE: CPT

## 2021-01-01 PROCEDURE — 97535 SELF CARE MNGMENT TRAINING: CPT | Performed by: OCCUPATIONAL THERAPIST

## 2021-01-01 PROCEDURE — 74011250636 HC RX REV CODE- 250/636: Performed by: INTERNAL MEDICINE

## 2021-01-01 PROCEDURE — G8427 DOCREV CUR MEDS BY ELIG CLIN: HCPCS | Performed by: PSYCHOLOGIST

## 2021-01-01 PROCEDURE — 85018 HEMOGLOBIN: CPT

## 2021-01-01 PROCEDURE — 96138 PSYCL/NRPSYC TECH 1ST: CPT | Performed by: PSYCHOLOGIST

## 2021-01-01 PROCEDURE — 87077 CULTURE AEROBIC IDENTIFY: CPT | Performed by: SPECIALIST

## 2021-01-01 PROCEDURE — 74011250637 HC RX REV CODE- 250/637: Performed by: STUDENT IN AN ORGANIZED HEALTH CARE EDUCATION/TRAINING PROGRAM

## 2021-01-01 PROCEDURE — 74011250636 HC RX REV CODE- 250/636: Performed by: NURSE PRACTITIONER

## 2021-01-01 PROCEDURE — 3288F FALL RISK ASSESSMENT DOCD: CPT | Performed by: INTERNAL MEDICINE

## 2021-01-01 PROCEDURE — 82272 OCCULT BLD FECES 1-3 TESTS: CPT

## 2021-01-01 PROCEDURE — 88305 TISSUE EXAM BY PATHOLOGIST: CPT

## 2021-01-01 PROCEDURE — 74011250636 HC RX REV CODE- 250/636: Performed by: SPECIALIST

## 2021-01-01 PROCEDURE — 93005 ELECTROCARDIOGRAM TRACING: CPT

## 2021-01-01 PROCEDURE — 80053 COMPREHEN METABOLIC PANEL: CPT

## 2021-01-01 PROCEDURE — 82746 ASSAY OF FOLIC ACID SERUM: CPT

## 2021-01-01 PROCEDURE — 0DB98ZX EXCISION OF DUODENUM, VIA NATURAL OR ARTIFICIAL OPENING ENDOSCOPIC, DIAGNOSTIC: ICD-10-PCS | Performed by: SPECIALIST

## 2021-01-01 PROCEDURE — 82607 VITAMIN B-12: CPT

## 2021-01-01 PROCEDURE — P9016 RBC LEUKOCYTES REDUCED: HCPCS

## 2021-01-01 PROCEDURE — 87635 SARS-COV-2 COVID-19 AMP PRB: CPT

## 2021-01-01 PROCEDURE — 93306 TTE W/DOPPLER COMPLETE: CPT

## 2021-01-01 PROCEDURE — C9113 INJ PANTOPRAZOLE SODIUM, VIA: HCPCS | Performed by: INTERNAL MEDICINE

## 2021-01-01 PROCEDURE — 31500 INSERT EMERGENCY AIRWAY: CPT

## 2021-01-01 PROCEDURE — 97165 OT EVAL LOW COMPLEX 30 MIN: CPT | Performed by: OCCUPATIONAL THERAPIST

## 2021-01-01 PROCEDURE — 74011000250 HC RX REV CODE- 250: Performed by: NURSE ANESTHETIST, CERTIFIED REGISTERED

## 2021-01-01 PROCEDURE — 84484 ASSAY OF TROPONIN QUANT: CPT

## 2021-01-01 PROCEDURE — G0439 PPPS, SUBSEQ VISIT: HCPCS | Performed by: INTERNAL MEDICINE

## 2021-01-01 PROCEDURE — 74011250636 HC RX REV CODE- 250/636: Performed by: NURSE ANESTHETIST, CERTIFIED REGISTERED

## 2021-01-01 PROCEDURE — 78451 HT MUSCLE IMAGE SPECT SING: CPT

## 2021-01-01 PROCEDURE — 74011000250 HC RX REV CODE- 250: Performed by: INTERNAL MEDICINE

## 2021-01-01 PROCEDURE — 74011250637 HC RX REV CODE- 250/637: Performed by: SPECIALIST

## 2021-01-01 PROCEDURE — 90834 PSYTX W PT 45 MINUTES: CPT | Performed by: PSYCHOLOGIST

## 2021-01-01 PROCEDURE — 76040000019: Performed by: SPECIALIST

## 2021-01-01 PROCEDURE — 30233N1 TRANSFUSION OF NONAUTOLOGOUS RED BLOOD CELLS INTO PERIPHERAL VEIN, PERCUTANEOUS APPROACH: ICD-10-PCS | Performed by: STUDENT IN AN ORGANIZED HEALTH CARE EDUCATION/TRAINING PROGRAM

## 2021-01-01 PROCEDURE — 1100F PTFALLS ASSESS-DOCD GE2>/YR: CPT | Performed by: INTERNAL MEDICINE

## 2021-01-01 PROCEDURE — 97161 PT EVAL LOW COMPLEX 20 MIN: CPT | Performed by: PHYSICAL THERAPIST

## 2021-01-01 PROCEDURE — 96132 NRPSYC TST EVAL PHYS/QHP 1ST: CPT | Performed by: PSYCHOLOGIST

## 2021-01-01 PROCEDURE — 99284 EMERGENCY DEPT VISIT MOD MDM: CPT

## 2021-01-01 PROCEDURE — 80048 BASIC METABOLIC PNL TOTAL CA: CPT

## 2021-01-01 PROCEDURE — 85027 COMPLETE CBC AUTOMATED: CPT

## 2021-01-01 PROCEDURE — 74011000258 HC RX REV CODE- 258: Performed by: NURSE PRACTITIONER

## 2021-01-01 RX ORDER — SODIUM BICARBONATE 1 MEQ/ML
SYRINGE (ML) INTRAVENOUS
Status: DISCONTINUED
Start: 2021-01-01 | End: 2021-01-01 | Stop reason: HOSPADM

## 2021-01-01 RX ORDER — LIDOCAINE HYDROCHLORIDE 20 MG/ML
INJECTION, SOLUTION EPIDURAL; INFILTRATION; INTRACAUDAL; PERINEURAL AS NEEDED
Status: DISCONTINUED | OUTPATIENT
Start: 2021-01-01 | End: 2021-01-01 | Stop reason: HOSPADM

## 2021-01-01 RX ORDER — HYDRALAZINE HYDROCHLORIDE 20 MG/ML
10 INJECTION INTRAMUSCULAR; INTRAVENOUS
Status: DISCONTINUED | OUTPATIENT
Start: 2021-01-01 | End: 2021-01-01 | Stop reason: HOSPADM

## 2021-01-01 RX ORDER — METOPROLOL TARTRATE 25 MG/1
12.5 TABLET, FILM COATED ORAL EVERY 12 HOURS
Status: DISCONTINUED | OUTPATIENT
Start: 2021-01-01 | End: 2021-01-01

## 2021-01-01 RX ORDER — PANTOPRAZOLE SODIUM 40 MG/1
40 TABLET, DELAYED RELEASE ORAL
Qty: 60 TABLET | Refills: 0 | Status: SHIPPED | OUTPATIENT
Start: 2021-01-01 | End: 2021-01-01 | Stop reason: SDUPTHER

## 2021-01-01 RX ORDER — RIVASTIGMINE 4.6 MG/24H
1 PATCH, EXTENDED RELEASE TRANSDERMAL DAILY
Status: DISCONTINUED | OUTPATIENT
Start: 2021-01-01 | End: 2021-01-01

## 2021-01-01 RX ORDER — PROPOFOL 10 MG/ML
INJECTION, EMULSION INTRAVENOUS AS NEEDED
Status: DISCONTINUED | OUTPATIENT
Start: 2021-01-01 | End: 2021-01-01 | Stop reason: HOSPADM

## 2021-01-01 RX ORDER — ACETAMINOPHEN 325 MG/1
650 TABLET ORAL
Status: DISCONTINUED | OUTPATIENT
Start: 2021-01-01 | End: 2021-01-01 | Stop reason: HOSPADM

## 2021-01-01 RX ORDER — SODIUM CHLORIDE 9 MG/ML
250 INJECTION, SOLUTION INTRAVENOUS AS NEEDED
Status: DISCONTINUED | OUTPATIENT
Start: 2021-01-01 | End: 2021-01-01 | Stop reason: HOSPADM

## 2021-01-01 RX ORDER — PRAVASTATIN SODIUM 40 MG/1
40 TABLET ORAL
Status: DISCONTINUED | OUTPATIENT
Start: 2021-01-01 | End: 2021-01-01 | Stop reason: HOSPADM

## 2021-01-01 RX ORDER — LOSARTAN POTASSIUM 100 MG/1
100 TABLET ORAL DAILY
Qty: 30 TABLET | Refills: 0 | Status: SHIPPED | OUTPATIENT
Start: 2021-01-01 | End: 2021-01-01 | Stop reason: SDUPTHER

## 2021-01-01 RX ORDER — LOSARTAN POTASSIUM 100 MG/1
100 TABLET ORAL DAILY
Status: DISCONTINUED | OUTPATIENT
Start: 2021-01-01 | End: 2021-01-01 | Stop reason: HOSPADM

## 2021-01-01 RX ORDER — METOPROLOL TARTRATE 25 MG/1
12.5 TABLET, FILM COATED ORAL 2 TIMES DAILY
Status: DISCONTINUED | OUTPATIENT
Start: 2021-01-01 | End: 2021-01-01 | Stop reason: HOSPADM

## 2021-01-01 RX ORDER — ONDANSETRON 2 MG/ML
4 INJECTION INTRAMUSCULAR; INTRAVENOUS
Status: DISCONTINUED | OUTPATIENT
Start: 2021-01-01 | End: 2021-01-01 | Stop reason: HOSPADM

## 2021-01-01 RX ORDER — PANTOPRAZOLE SODIUM 40 MG/1
40 TABLET, DELAYED RELEASE ORAL DAILY
Qty: 90 TABLET | Refills: 0 | Status: SHIPPED | OUTPATIENT
Start: 2021-01-01 | End: 2022-03-06

## 2021-01-01 RX ORDER — PANTOPRAZOLE SODIUM 40 MG/1
40 TABLET, DELAYED RELEASE ORAL
Status: DISCONTINUED | OUTPATIENT
Start: 2021-01-01 | End: 2021-01-01 | Stop reason: HOSPADM

## 2021-01-01 RX ORDER — SODIUM CHLORIDE 0.9 % (FLUSH) 0.9 %
5-40 SYRINGE (ML) INJECTION AS NEEDED
Status: DISCONTINUED | OUTPATIENT
Start: 2021-01-01 | End: 2021-01-01 | Stop reason: HOSPADM

## 2021-01-01 RX ORDER — SODIUM CHLORIDE 0.9 % (FLUSH) 0.9 %
5-40 SYRINGE (ML) INJECTION EVERY 8 HOURS
Status: DISCONTINUED | OUTPATIENT
Start: 2021-01-01 | End: 2021-01-01 | Stop reason: HOSPADM

## 2021-01-01 RX ORDER — HYDROCHLOROTHIAZIDE 25 MG/1
12.5 TABLET ORAL DAILY
Status: DISCONTINUED | OUTPATIENT
Start: 2021-01-01 | End: 2021-01-01

## 2021-01-01 RX ORDER — RIVASTIGMINE 4.6 MG/24H
1 PATCH, EXTENDED RELEASE TRANSDERMAL DAILY
Qty: 30 PATCH | Refills: 0 | Status: SHIPPED | OUTPATIENT
Start: 2021-01-01 | End: 2021-01-01

## 2021-01-01 RX ORDER — DONEPEZIL HYDROCHLORIDE 5 MG/1
5 TABLET, FILM COATED ORAL
Qty: 30 TABLET | Refills: 2 | Status: SHIPPED | OUTPATIENT
Start: 2021-01-01 | End: 2021-01-01 | Stop reason: SINTOL

## 2021-01-01 RX ORDER — HALOPERIDOL 5 MG/ML
1 INJECTION INTRAMUSCULAR ONCE
Status: DISPENSED | OUTPATIENT
Start: 2021-01-01 | End: 2021-01-01

## 2021-01-01 RX ORDER — METOPROLOL TARTRATE 25 MG/1
12.5 TABLET, FILM COATED ORAL 2 TIMES DAILY
Qty: 30 TABLET | Refills: 0 | Status: SHIPPED | OUTPATIENT
Start: 2021-01-01 | End: 2021-01-01 | Stop reason: SDUPTHER

## 2021-01-01 RX ORDER — SODIUM CHLORIDE 9 MG/ML
25 INJECTION, SOLUTION INTRAVENOUS CONTINUOUS
Status: DISCONTINUED | OUTPATIENT
Start: 2021-01-01 | End: 2021-01-01 | Stop reason: HOSPADM

## 2021-01-01 RX ORDER — VALSARTAN AND HYDROCHLOROTHIAZIDE 160; 12.5 MG/1; MG/1
TABLET, FILM COATED ORAL
Qty: 45 TAB | Refills: 3 | Status: SHIPPED | OUTPATIENT
Start: 2021-01-01 | End: 2021-01-01

## 2021-01-01 RX ORDER — PRAVASTATIN SODIUM 40 MG/1
TABLET ORAL
Qty: 90 TAB | Refills: 3 | Status: SHIPPED | OUTPATIENT
Start: 2021-01-01

## 2021-01-01 RX ORDER — LANOLIN ALCOHOL/MO/W.PET/CERES
325 CREAM (GRAM) TOPICAL
Qty: 30 TABLET | Refills: 0 | Status: SHIPPED | OUTPATIENT
Start: 2021-01-01 | End: 2021-01-01 | Stop reason: SDUPTHER

## 2021-01-01 RX ADMIN — Medication 10 ML: at 22:19

## 2021-01-01 RX ADMIN — HYDROCHLOROTHIAZIDE 12.5 MG: 25 TABLET ORAL at 08:59

## 2021-01-01 RX ADMIN — AMIODARONE HYDROCHLORIDE 1 MG/MIN: 50 INJECTION, SOLUTION INTRAVENOUS at 23:30

## 2021-01-01 RX ADMIN — Medication 10 ML: at 21:10

## 2021-01-01 RX ADMIN — Medication 10 ML: at 05:39

## 2021-01-01 RX ADMIN — PANTOPRAZOLE SODIUM 40 MG: 40 TABLET, DELAYED RELEASE ORAL at 08:59

## 2021-01-01 RX ADMIN — ACETAMINOPHEN 650 MG: 325 TABLET ORAL at 22:20

## 2021-01-01 RX ADMIN — HYDRALAZINE HYDROCHLORIDE 10 MG: 20 INJECTION INTRAMUSCULAR; INTRAVENOUS at 16:26

## 2021-01-01 RX ADMIN — Medication 10 ML: at 06:25

## 2021-01-01 RX ADMIN — LOSARTAN POTASSIUM 100 MG: 100 TABLET, FILM COATED ORAL at 09:16

## 2021-01-01 RX ADMIN — PROPOFOL 20 MG: 10 INJECTION, EMULSION INTRAVENOUS at 14:27

## 2021-01-01 RX ADMIN — LOSARTAN POTASSIUM 100 MG: 100 TABLET, FILM COATED ORAL at 09:58

## 2021-01-01 RX ADMIN — SODIUM CHLORIDE 40 MG: 9 INJECTION, SOLUTION INTRAMUSCULAR; INTRAVENOUS; SUBCUTANEOUS at 05:38

## 2021-01-01 RX ADMIN — IRON SUCROSE 200 MG: 20 INJECTION, SOLUTION INTRAVENOUS at 16:15

## 2021-01-01 RX ADMIN — METOPROLOL TARTRATE 12.5 MG: 25 TABLET, FILM COATED ORAL at 09:58

## 2021-01-01 RX ADMIN — PRAVASTATIN SODIUM 40 MG: 40 TABLET ORAL at 21:41

## 2021-01-01 RX ADMIN — PRAVASTATIN SODIUM 40 MG: 40 TABLET ORAL at 21:08

## 2021-01-01 RX ADMIN — HYDROCHLOROTHIAZIDE 12.5 MG: 25 TABLET ORAL at 09:14

## 2021-01-01 RX ADMIN — PANTOPRAZOLE SODIUM 40 MG: 40 TABLET, DELAYED RELEASE ORAL at 09:59

## 2021-01-01 RX ADMIN — AMIODARONE HYDROCHLORIDE 150 MG: 1.5 INJECTION, SOLUTION INTRAVENOUS at 23:13

## 2021-01-01 RX ADMIN — PANTOPRAZOLE SODIUM 40 MG: 40 TABLET, DELAYED RELEASE ORAL at 16:26

## 2021-01-01 RX ADMIN — Medication 10 ML: at 17:29

## 2021-01-01 RX ADMIN — METOPROLOL TARTRATE 12.5 MG: 25 TABLET, FILM COATED ORAL at 21:08

## 2021-01-01 RX ADMIN — PANTOPRAZOLE SODIUM 40 MG: 40 TABLET, DELAYED RELEASE ORAL at 16:15

## 2021-01-01 RX ADMIN — Medication 10 ML: at 16:15

## 2021-01-01 RX ADMIN — LIDOCAINE HYDROCHLORIDE 50 MG: 20 INJECTION, SOLUTION EPIDURAL; INFILTRATION; INTRACAUDAL; PERINEURAL at 14:19

## 2021-01-01 RX ADMIN — PROPOFOL 20 MG: 10 INJECTION, EMULSION INTRAVENOUS at 14:22

## 2021-01-01 RX ADMIN — LOSARTAN POTASSIUM 100 MG: 100 TABLET, FILM COATED ORAL at 08:59

## 2021-01-01 RX ADMIN — SODIUM CHLORIDE 25 ML/HR: 900 INJECTION, SOLUTION INTRAVENOUS at 13:39

## 2021-01-01 RX ADMIN — SODIUM CHLORIDE 40 MG: 9 INJECTION, SOLUTION INTRAMUSCULAR; INTRAVENOUS; SUBCUTANEOUS at 17:29

## 2021-01-01 RX ADMIN — PROPOFOL 30 MG: 10 INJECTION, EMULSION INTRAVENOUS at 14:25

## 2021-01-01 RX ADMIN — IRON SUCROSE 200 MG: 20 INJECTION, SOLUTION INTRAVENOUS at 13:09

## 2021-01-01 RX ADMIN — Medication 10 ML: at 05:06

## 2021-01-01 RX ADMIN — Medication 10 ML: at 21:42

## 2021-01-01 RX ADMIN — IRON SUCROSE 200 MG: 20 INJECTION, SOLUTION INTRAVENOUS at 17:19

## 2021-01-01 RX ADMIN — Medication 10 ML: at 13:15

## 2021-01-01 RX ADMIN — PRAVASTATIN SODIUM 40 MG: 40 TABLET ORAL at 22:19

## 2021-01-01 RX ADMIN — PROPOFOL 30 MG: 10 INJECTION, EMULSION INTRAVENOUS at 14:19

## 2021-01-01 RX ADMIN — INFLUENZA VIRUS VACCINE 0.5 ML: 15; 15; 15; 15 SUSPENSION INTRAMUSCULAR at 13:05

## 2021-01-04 NOTE — PROGRESS NOTES
[facility]rology Clinic at [address] Medical Office Building [facility] [address] [facility] [place] Office:  [phone]  Fax: [phone] Initial Office Exam 
 
Patient Name: Jonny Salas Age: 80 y.o. Gender: female Occupation: Retired [employer]; Manager Handedness: right handed Presenting Concern: Memory loss Primary Care Physician: Reilly Anne MD 
Referring Provider: No ref. provider found REASON FOR REFERRAL: 
This comprehensive and medically necessary neuropsychological assessment was requested to assist with a differential diagnosis of cognitive decline. The use and purpose of this examination, as well as the extent and limitations of confidentiality, were explained prior to obtaining permission to participate. Instructions were provided regarding the necessity to put forth optimal effort and answer questions truthfully in order to obtain reliable and accurate test results. PERTINENT HISTORY: 
Ms. Ricky Riley presented for a neuropsychological assessment at the recommendation of her treating physician secondary to complaints of short-term memory loss, repetition of comments, increased frustration, and .  Ms. Ricky Riley began noticing symptoms several years. She reports a fall related to dehydration and UTI in October, 2020. From a brief review of her medical and personal history there has not been any other significant neurological injury or illness noted or reported.   She did not report experiencing depression or anxiety in the past.   
 
 Ms. Bryan Hutchison does not  report any problems at birth or difficulties meeting developmental milestones. She reports that she had an adequate level of family support and was not subject to trauma or abuse as a child. Ms. Bryan Hutchison does not  report being retain in school or receiving special assistance in any of she classes or subjects. Ms. Bryan Hutchison completed 13 years of education. Ms. Bryan Hutchison does not  exercise on a regular basis, but does  maintain a balanced diet. She does not  report problems with sleep and does not  complain of pain. She does  participate in mentally stimulating activities. Ms. Bryan Hutchison does not  have concerns regarding prescription medications, family members, place of residence, or financial stressors. Ms. Bryan Hutchison indicated that she is independent in her instrumental activities of daily living, including shopping, meal preparation, housekeeping, doing laundry, driving a car, managing medications, and finances. Current Outpatient Medications Medication Sig  
 diclofenac (VOLTAREN) 1 % gel Apply 4 g to affected area four (4) times daily.  valsartan-hydroCHLOROthiazide (DIOVAN-HCT) 160-12.5 mg per tablet Take 0.5 Tabs by mouth daily.  pravastatin (PRAVACHOL) 40 mg tablet Take 1 Tab by mouth nightly.  aspirin delayed-release 81 mg tablet Take  by mouth daily.  multivitamins-minerals-lutein (CENTRUM SILVER) Tab Take 1 Tab by mouth daily. No current facility-administered medications for this visit. Past Medical History:  
Diagnosis Date  AK (actinic keratosis) 7/27/2017  Arteriosclerotic coronary artery disease 7/27/2017  Arthritis of right knee 7/27/2017  Back pain 7/27/2017  Carotid atherosclerosis 7/27/2017  Cough 7/27/2017  Fatigue 7/27/2017  History of pneumonia 7/27/2017  
 HTN (hypertension) 7/27/2017  Hyperlipidemia 7/27/2017  Hypertension  Need for Streptococcus pneumoniae vaccination 7/27/2017  On statin therapy 7/27/2017  Plantar fasciitis 7/27/2017  Pneumonia  Shoulder sprain 7/27/2017  Syncope 7/27/2017 No flowsheet data found. No data recorded Past Surgical History:  
Procedure Laterality Date  HX GI    
 ruptured intestine Social History Socioeconomic History  Marital status: SINGLE Spouse name: Not on file  Number of children: Not on file  Years of education: Not on file  Highest education level: Not on file Tobacco Use  Smoking status: Never Smoker  Smokeless tobacco: Never Used Substance and Sexual Activity  Alcohol use: No  
 Drug use: No  
 Sexual activity: Never Family History Problem Relation Age of Onset  Hypertension Mother  Heart Disease Father CT Results (most recent): 
Results from Hospital Encounter encounter on 10/17/20 CT HEAD WO CONT Narrative EXAM:  CT HEAD WO CONT INDICATION:   Ground level fall, post scalp hematoma, headache COMPARISON: CT head 5/18/2015. TECHNIQUE: Unenhanced CT of the head was performed using 5 mm images. Brain and 
bone windows were generated. CT dose reduction was achieved through use of a 
standardized protocol tailored for this examination and automatic exposure 
control for dose modulation. FINDINGS: 
Unchanged generalized parenchymal volume loss with commensurate dilation of the 
sulci and ventricular system. Unchanged scattered periventricular and deep white 
matter hypodensities, consistent with mild chronic microangiopathic ischemic 
changes. Basilar cisterns are patent. No midline shift. There is no evidence of 
acute infarct, hemorrhage, or extraaxial fluid collection. The paranasal sinuses, mastoid air cells, and middle ears are clear. The orbital 
contents are within normal limits with bilateral lens implants. There are no 
significant osseous or extracranial soft tissue lesions. Severe degenerative 
disease at C1-C2.  
  
 Impression IMPRESSION: 
 1. No evidence of acute intracranial abnormality. MRI Results (most recent): No results found for this or any previous visit. MENTAL STATUS: 
 
Orientation:  Oriented year season month and day of week Eye Contact:  Appropriate Motor Behavior:   Ambulates independently Speech:   Fluent and intelligible Thought Process:  Slowed Thought Content:  No evidence of hallucinations or delusions Suicidal ideations:  Possible ideation suggested, no intent Mood:   Dysphoria Affect:   Sullen Concentration:   Mild decrease Abstraction:   Moderate decrease Insight:   Reduced On the Modified Mini-Mental Status Exam: 72/100 (< 1 %ile) DIAGNOSTIC IMPRESSIONS: 
  ICD-10-CM ICD-9-CM 1. Late onset Alzheimer's disease without behavioral disturbance (HCC)  G30.1 331.0 F02.80 294.10 PLAN: 
1. Complete a comprehensive neuropsychological assessment to provide a differential diagnosis of presenting concerns as well as to assist with disposition and treatment planning as appropriate. 2. Consider compensatory and remedial cognitive training. 3. Continue with driving restrictions at this time. 4. Consider completing a comprehensive driving evaluation. 5. Consider referral for elder health nurse to provide an in-home functional assessment. 6. Consider placement issues to provide greater structure and supervision to ensure safety, health and well-being. [id number] x 1 Review of records. Face to face interview w/ patient. Determine test protocol: 60 minutes. Total 1 unit 88727 continuation of service Terry Comer, PhD, ABPP, LCP Licensed Clinical Psychologist/ Neuropsychologist 
 
 
 
This note will not be viewable in [address]

## 2021-01-13 NOTE — TELEPHONE ENCOUNTER
----- Message from [employer] sent at 1/13/2021 12:37 PM EST -----  Regarding: / Eleni/telephone  Contact: [phone]  General Message/Vendor Calls    Caller's first and last name: daughter       Reason for call:to r/s appt. Callback required yes/no and why:      Best contact number(s): [phone]      Details to clarify the request: appt for Dr. Zee Tolentino on 01/28 needs to be r/s'd. Please give them a call.       [employer]

## 2021-01-14 NOTE — TELEPHONE ENCOUNTER
----- Message from Gloria Salcido sent at 1/14/2021 10:44 AM EST -----  Regarding: ROBERT/TELEPHONE  Contact: [phone]  General Message/Vendor Calls    Caller's first and last name: Ronnie Escoto (daughter)      Reason for call: Reschedule procedure      Callback required yes/no and why: Yes      Best contact number(s): [phone]      Details to clarify the request: Ronnie Escoto would like to reschedule the patient's procedure to a Monday or Friday.  Current the procedure is schedule for Thursday 01/28/20 @ 1:00 PM. 2nd attempt      Gloria Salcido

## 2021-01-25 NOTE — TELEPHONE ENCOUNTER
----- Message from Earlene Darby sent at 1/25/2021 10:58 AM EST -----  Regarding: LISA/MD/TELEPHONE  Contact: [phone]  General Message/Vendor Calls    Caller's first and last name: Elham Camarillo (daughter)      Reason for call: Reschedule procedure      Callback required yes/no and why: Yes      Best contact number(s): [phone]      Details to clarify the request: Elham Camarillo (daughter) requesting to reschedule patient appt due to patient has not received her hearing aid so that she may understand what is being said. Daughter requesting to reschedule after last week of February on a Monday or Friday.       Earlene Darby

## 2021-02-04 NOTE — TELEPHONE ENCOUNTER
Daughter Aileen Spring calling stating mother is depressed also crying sometimes and wanted to know if Dr. Mary Snyder would call in a prescription for her moods.

## 2021-02-05 NOTE — TELEPHONE ENCOUNTER
MD Odilia Campbell LPN 1 hour ago (8:99 AM)     I would like to wait until she is finished her assessment with Dr. Stephanie Mata before starting medication.         Patients daughter notified

## 2021-02-09 NOTE — TELEPHONE ENCOUNTER
Patients daughter Marixa Mcguire called stating her mother went to [facility] yesterday for a hearing test for hearing aids and was advised to call Dr Malina Ocasio to get a referral.    Fax #[phone]  [facility]

## 2021-02-19 PROBLEM — R41.3 MEMORY LOSS: Status: ACTIVE | Noted: 2021-01-01

## 2021-02-19 NOTE — PROGRESS NOTES
HIPAA verified by two patient identifiers. Janny Ferreira is a 80 y.o. female Chief Complaint Patient presents with  Hypertension 2 month f/u Visit Vitals BP (!) 152/63 (BP 1 Location: Left upper arm, BP Patient Position: Sitting, BP Cuff Size: Adult) Pulse 65 Temp 97.2 °F (36.2 °C) (Oral) Resp 16 Ht 5' 2\" (1.575 m) Wt 142 lb (64.4 kg) SpO2 95% BMI 25.97 kg/m² Pain Scale: 0 - No pain/10 Pain Location:  
 
 
Health Maintenance Due Topic Date Due  
 COVID-19 Vaccine (1 of 2) 05/17/1948  DTaP/Tdap/Td series (1 - Tdap) 05/17/1953  Shingrix Vaccine Age 50> (1 of 2) 05/17/1982  GLAUCOMA SCREENING Q2Y  05/17/1997  Bone Densitometry (Dexa) Screening  05/17/1997  Pneumococcal 65+ years (2 of 2 - PPSV23) 02/14/2020  Medicare Yearly Exam  02/15/2020  Flu Vaccine (1) 09/01/2020 Coordination of Care Questionnaire: 
:  
1) Have you been to an emergency room, urgent care, or hospitalized since your last visit? If yes, where when, and reason for visit? no  
 
 
2. Have seen or consulted any other health care provider since your last visit? If yes, where when, and reason for visit? NO Patient is accompanied by self I have received verbal consent from Janny Ferreira to discuss any/all medical information while they are present in the room.

## 2021-02-19 NOTE — PROGRESS NOTES
Michael Trevino is a 80 y.o. female and presents with Hypertension (2 month f/u) Ana Laura Hurt Subjective: 
Mrs. Candi Sanchez presents today for follow-up of hypertension, hyperlipidemia, memory loss. She had a syncopal event a couple of months ago and was evaluated and released from the hospital.  She had carotid Dopplers that showed minimal atherosclerosis of the carotid arteries. Her blood pressure has been reasonably well controlled although today it is mildly elevated. She is taking her medication as prescribed. She has had an initial evaluation with clinical psychology and is to be scheduled for a formal testing. She has seen ENT/audiology and has a follow-up to get new hearing aids. She is concerned about a soreness on the back of her head that occurred after her fall. We reviewed her head CT scan today and there were no bony changes and no soft tissue changes at the area that she is concerned about. She notes continued discomfort at the site. The patient was asked if she was depressed and she states that she is not depressed at this time. She states that she has her moments but does not feel depressed at this time. Past Medical History:  
Diagnosis Date  AK (actinic keratosis) 7/27/2017  Arteriosclerotic coronary artery disease 7/27/2017  Arthritis of right knee 7/27/2017  Back pain 7/27/2017  Carotid atherosclerosis 7/27/2017  Cough 7/27/2017  Fatigue 7/27/2017  History of pneumonia 7/27/2017  
 HTN (hypertension) 7/27/2017  Hyperlipidemia 7/27/2017  Hypertension  Need for Streptococcus pneumoniae vaccination 7/27/2017  On statin therapy 7/27/2017  Plantar fasciitis 7/27/2017  Pneumonia  Shoulder sprain 7/27/2017  Syncope 7/27/2017 Past Surgical History:  
Procedure Laterality Date  HX GI    
 ruptured intestine No Known Allergies Current Outpatient Medications Medication Sig Dispense Refill  diclofenac (VOLTAREN) 1 % gel Apply 4 g to affected area four (4) times daily. 100 g 1  valsartan-hydroCHLOROthiazide (DIOVAN-HCT) 160-12.5 mg per tablet Take 0.5 Tabs by mouth daily. 45 Tab 3  pravastatin (PRAVACHOL) 40 mg tablet Take 1 Tab by mouth nightly. 90 Tab 3  
 aspirin delayed-release 81 mg tablet Take  by mouth daily.  multivitamins-minerals-lutein (CENTRUM SILVER) Tab Take 1 Tab by mouth daily. Social History Socioeconomic History  Marital status: SINGLE Spouse name: Not on file  Number of children: Not on file  Years of education: Not on file  Highest education level: Not on file Tobacco Use  Smoking status: Never Smoker  Smokeless tobacco: Never Used Substance and Sexual Activity  Alcohol use: No  
 Drug use: No  
 Sexual activity: Never Family History Problem Relation Age of Onset  Hypertension Mother  Heart Disease Father Review of Systems Constitutional:  negative for fevers, chills, anorexia and weight loss Eyes:    negative for visual disturbance and irritation ENT:    negative for tinnitus,sore throat,nasal congestion,ear pains. hoarseness Respiratory:     negative for cough, hemoptysis, dyspnea,wheezing CV:    negative for chest pain, palpitations, lower extremity edema GI:    negative for nausea, vomiting, diarrhea, abdominal pain,melena Endo:               negative for polyuria,polydipsia,polyphagia,heat intolerance Genitourinary : negative for frequency, dysuria and hematuria Integumentary: negative for rash and pruritus Hematologic:   negative for easy bruising and gum/nose bleeding Musculoskel:  negative for myalgias, arthralgias, back pain, muscle weakness, joint pain Neurological:   negative for headaches, dizziness, vertigo, memory problems and gait Behavl/Psych:  negative for feelings of anxiety, depression, mood changes ROS otherwise negative Objective: 
Visit Vitals BP (!) 152/63 (BP 1 Location: Left upper arm, BP Patient Position: Sitting, BP Cuff Size: Adult) Pulse 65 Temp 97.2 °F (36.2 °C) (Oral) Resp 16 Ht 5' 2\" (1.575 m) Wt 142 lb (64.4 kg) SpO2 95% BMI 25.97 kg/m² Physical Exam:  
General appearance - alert, well appearing, and in no distress Mental status - alert, oriented to person, place, and time EYE-PARESH, EOMI, fundi normal, corneas normal, no foreign bodies ENT-ENT exam normal, no neck nodes or sinus tenderness, there is tenderness in the occipital scalp on the left side without a palpable hematoma or bony abnormality Nose - normal and patent, no erythema, discharge or polyps Mouth - mucous membranes moist, pharynx normal without lesions Neck - supple, no significant adenopathy Chest - clear to auscultation, no wheezes, rales or rhonchi, symmetric air entry Heart - normal rate, regular rhythm, normal S1, S2, no murmurs, rubs, clicks or gallops Abdomen - soft, nontender, nondistended, no masses or organomegaly Lymph- no adenopathy palpable Ext-peripheral pulses normal, no pedal edema, no clubbing or cyanosis Skin-Warm and dry. no hyperpigmentation, vitiligo, or suspicious lesions Neuro -alert, oriented, normal speech, no focal findings or movement disorder noted Assessment/Plan: 
Diagnoses and all orders for this visit: 1. Essential hypertension 2. Elevated glucose ICD-10-CM ICD-9-CM 1. Essential hypertension  I10 401.9 2. Elevated glucose  R73.09 790.29 Plan: The patient's memory loss is pending formal evaluation by clinical psychology. She will have hearing aids placed as planned. Her blood pressure is mildly elevated today although it was excellent on her previous visit in November. She will continue her current regimen and will monitor her blood pressures at home. Her daughter Mack Cardozo is present with her today and will help her keep track of her blood pressure. She will follow-up in 3 months for reevaluation. Of note she had a mildly elevated glucose on her BMP and will need a follow-up A1c and glucose on return. I have reviewed with the patient details of the assessment and plan and all questions were answered. Relevent patient education was performed. Verbal and/or written instructions (see AVS) provided. The most recent lab findings were reviewed with the patient. Plan was discussed with patient who verbally expressed understanding. An After Visit Summary was printed and given to the patient.  
 
Michelle Bassett MD

## 2021-02-26 NOTE — TELEPHONE ENCOUNTER
Daughter call with BP readings on her mother per Dr. Deidra Cheadle. BP Heart rate  2/20   154/54   71  2/25    157/56   68    BP has been running pretty much the same every day.

## 2021-03-01 NOTE — TELEPHONE ENCOUNTER
Patients daughter Rosalina Citron calling about her mother Pippa Ruiz. Dr. Saritha Tay increase her BP medication to a whole tablet. Patient started taking the whole tablet on Saturday. They went to the grocery story today and she complained of feeling dizzy and tired also r-side of back was hurting, but her BP was 123/61 heart rate 67.

## 2021-03-02 NOTE — TELEPHONE ENCOUNTER
Jonny Salas is a 80 y.o. female  HIPAA      d by two patient identifiers. Spoke with patients daughter  Mack Cardozo  . Advised her of the message from Dr Janice Carlos . She stated she didn't take the whole tab of  valsartan-hydroCHLOROthiazide (DIOVAN-HCT) 160-12.5 mg per tablet     But took a 1/2 tab today and she is feeling better. Not sure if she can keep it this way. When she takes whole tab she gets dizziness and very tired. Also she states she is drinking plenty of water but does not see her go to the bathroom much. Concerned about this.  Please advise   [phone]

## 2021-03-08 NOTE — TELEPHONE ENCOUNTER
Patient's daughter, Brianne Isaacs informed and states understanding. Has no questions at this time.

## 2021-03-25 NOTE — TELEPHONE ENCOUNTER
----- Message from Kirill Kerr sent at 3/23/2021  9:32 AM EDT -----  Regarding: Dr. Cece Bajwa  General Message/Vendor Calls    Caller's first and last name: Ahmet Hernandez       Reason for call: would like to schedule appt for testing for pt memory loss, daughter is available on Mondays and Fridays       Callback required yes/no and why: yes      Best contact number(s):[phone]      Details to clarify the request:      Kirill Kerr

## 2021-03-26 NOTE — TELEPHONE ENCOUNTER
Called and left v/m for return call. Gave direct # and advised of limited times available due to dr sanchez.

## 2021-04-02 NOTE — TELEPHONE ENCOUNTER
Called [facility] for update. Spoke with roque and she advised Charles Woodard was approved. Auth# [id number]    Effective 03/01/21-03/01/22    Agent is re-faxing to us.

## 2021-04-16 NOTE — PROGRESS NOTES
This note will not be viewable in [facility] for the following reason(s). Likely risk of substantial harm from the misinterpretation of the data generated by this evaluation. [facility] at [address] Medical Office [address] Office:  [phone]  Fax: [phone] Neuropsychological Evaluation Report Patient Name: Everett Gongora Age: 80 y.o. Gender: female Occupation: Retired [employer], [employer]; Manager Handedness: right handed Presenting Concern: Memory loss Primary Care Physician: Orin Solis MD 
 
PATIENT HISTORY (OBTAINED DURING INITIAL CLINICAL EVALUATION): 
 
REASON FOR REFERRAL: 
This comprehensive and medically necessary neuropsychological assessment was requested to assist with a differential diagnosis of cognitive decline. The use and purpose of this examination, as well as the extent and limitations of confidentiality, were explained prior to obtaining permission to participate. Instructions were provided regarding the necessity to put forth optimal effort and answer questions truthfully in order to obtain reliable and accurate test results. 
  
PERTINENT HISTORY: 
Ms. Virgil Rajan presented for a neuropsychological assessment at the recommendation of her treating physician secondary to complaints of short-term memory loss, repetition of comments, increased frustration, and .  Ms. Virgil Rajan began noticing symptoms several years. She reports a fall related to dehydration and UTI in October, 2020. From a brief review of her medical and personal history there has not been any other significant neurological injury or illness noted or reported. She did not report experiencing depression or anxiety in the past.   
  
Ms. Virgil Rajan does not  report any problems at birth or difficulties meeting developmental milestones.  She reports that she had an adequate level of family support and was not subject to trauma or abuse as a child. Ms. Meggan Moreira does not  report being retain in school or receiving special assistance in any of she classes or subjects. Ms. Meggan Moreira completed 13 years of education. 
  
Ms. Meggan Moreira does not  exercise on a regular basis, but does  maintain a balanced diet. She does not  report problems with sleep and does not  complain of pain. She does  participate in mentally stimulating activities. Ms. Meggan Moreira does not  have concerns regarding prescription medications, family members, place of residence, or financial stressors. Ms. Meggan Moreira indicated that she is independent in her instrumental activities of daily living, including shopping, meal preparation, housekeeping, doing laundry, driving a car, managing medications, and finances. METHODS OF ASSESSMENT (Current Evaluation): 
Clinician Administered: 
Clinical Interview Review of Medical Records Clock Drawing Task Modified Mini-Mental Status Exam (3MS) Test of Premorbid Functioning Hospital Anxiety and Depression Scale Revised Memory and Behavior Checklist 
 
Technician Administered: 
NAB: Executive Functions Module 1 RBANS-A Reliable Digit Span Test of Memory Malingering Test of Practical Judgment (9-Item) [place] Functional Living Scale Trail Making Test 
 
TEST OBSERVATIONS: 
Ms. Meggan Moreira arrived promptly for the testing session. Dress and grooming were appropriate; physical presentation was unchanged from that observed during the clinical interview. Speech was fluent, intelligible, and goal-directed. Affect was congruent with the euthymic mood conveyed. Ms. Meggan Moreira was adequately cooperative and appeared to put forth good effort throughout this examination. Rapport with the examiner was adequately established and maintained. Minimal prompting was required. Comprehension of test instructions was not problematic.   Performance motivation was objectively measured by three instruments (TOMM, RBANS, EI, Reliable Digit Span), and Ms. Ronen Taylor produced a normal score on three of these measures. Accordingly, test findings below do not appear to be the product of disingenuous effort. Given the above observations, plus comments contained in the Mental Status section, the results of this examination are regarded as reasonably reliable and valid. TEST RESULTS: 
Quantitative test results are derived from comparisons to age and education corrected cohort normative data, where applicable. Percentiles are included in these instances. Qualitative test results are determined using clinical observations. General Orientation and Awareness:      
Orientation to person, year, month, day of month, day of week, state, town, and circumstance. Awareness of deficits WNL Cognitive performance validity testing  Valid Attention/Concentration:      Classification: 
Coding (4 percentile)           Moderately Impaired Simple visuomotor tracking (3 percentile)               Moderately Impaired Digit span (30 percentile)                      Average Digits backward (1 percentile)                 Severely Impaired Visuospatial and Constructional Praxis:    
Figure copy (0.01 percentile)                                       Severely Impaired Line orientation (7 percentile)                                                Mildly Impaired Language:        
Picture naming (0.01 percentile)                               Severely Impaired Semantic fluency (<1 percentile)                    Severely Impaired Memory and Learning:      
Immediate word list learning (0.07 percentile)               Severely Impaired Delayed word list recall (<1 percentile)                  Severely Impaired Delayed word list recognition (0.01 percentile)               Severely Impaired Immediate story memory (0.03 percentile)               Severely Impaired Delayed story recall (<1 percentile) Severely Impaired Delayed figure recall (<1 percentile)                Severely Impaired Cognitive Tests of Executive Functioning:    
Trail Making B (0.5 percentile)                    Severely Impaired Mazes (93 percentile)                   Superior Simple judgment in daily decision making (<1 percentile)              Severely Impaired Complex judgment in daily decision making (<1 percentile)               Severely Impaired Categories (2 percentile)                   Moderately Impaired Word generation (54 percentile)                    Average Adaptive Behavioral Measure of Daily Functioning: 
 Time:    <2 %ile Money/calculations:     9 %ile Communication:    <2 %ile Memory:     <2 %ile Total:     T= 25 (0.6%ile) Intellectual and Basic Cognitive Functioning (WAIS-IV): 
ACS Test of pre-morbid functioning reading recognition lower limit estimated WAIS-IV FSIQ score: 
     Estimated full scale IQ:              84 (15 percentile)    Low Average Emotional Functioning: HADS:    Depression = 6  Normal range,   Anxiety  = 6   Normal range IMPRESSIONS: 
Ms. Ronen Taylor was seen for a comprehensive neuropsychological evaluation and administered a battery of measures assessing the neurocognitive domains of attention, language, memory, visual-spatial, and executive functioning. Her overall score averaged across the 5 neurocognitive domains yielded a performance in the severely impaired range. All of her neurocognitive domains were in the impaired range ranging from mildly impaired to profoundly impaired. Specific areas of spared cognitive functioning were noted on measures of auditory attention, visual planning, and word generation. In summary, Ms. Ronen Taylor is currently experiencing a significant dementia, with neurocognitive deficits across all domains. She is potentially at significant risk for unintentional self-harm and will require close monitoring and supervision.  She is not considered to have the capacity to manage her financial affairs independently or manager her legal affairs without assistance. DIAGNOSTIC IMPRESSIONS: 
  ICD-10-CM ICD-9-CM 1. Dementia due to Alzheimer's disease ([facility].)  G30.9 331.0 F02.80 294.10 RECOMMENDATIONS:  
1. Findings should be reviewed with Ms. Rehana Castañeda to insure her understanding and discuss the potential implications. 2. Emphasis should be placed on Ms. Rehana Castañeda obtaining good sleep hygiene and maintaining adequate physical exercise to promote good brain health. 3. Ms. Rehana Castañeda may benefit from a referral to an  to ensure her legal matters are in order. 4. Ms. Rehana Castañeda would benefit from from a structured environnment that could assist with her dietary needs, medical needs, and provide a cognitively stimulating environment. 4. Ms. Rehana Castañeda is encouraged to seek out various forms of mental stimulation that would help to \"exercise\" her brain. This might include learning a new hobby, reading or listening to audio books, or playing table top games. Thank you for allowing me the opportunity to assist you in Ms. Lafleur's care. Please do not hesitate to contact me should you have additional questions that I may not have addressed. 96136 x 1 
96137 x 1 
96138 x 1 
96139 x 5 
96132 x 1 
96133 x 1 Mandi Lozada, Ph.D., ABPP Licensed Clinical Psychologist 
Neuropsychologist 
Board Certified Rehabilitation Psychologist 
 
 
Time Documentation: 
  
53687 x 1: Neurobehavioral Status Exam/Clinical Interview: (1 hour, (already billed on first date of service) 45040*0 Neuropsych testing/data gathering by Neuropsychologist (35 additional minutes, see above) 02870 x 1 
96139 x 6 Test Administration/Data Gathering By Technician: (3.0 hours). 52048 x 1 (first 30 minutes), 96139 x 7 (each additional 30 minutes) 17422 x 1 
96133 x 1 Testing Evaluation Services by Neuropsychologist (1 hour, 50 minutes) 96132 x 1 (first hour), 96133 x 1 (50 minutes) Definitions:   
  
64049/72258:  Neurobehavioral Status Exam, Clinical interview. Clinical assessment of thinking, reasoning and judgment, by neuropsychologist, both face to face time with patient and time interpreting those test results and reporting, first and subsequent hours) 72991/11603: Neuropsychological Test Administration by Technician/Psychometrist, first 30 minutes and each additional 30 minutes. The above includes: Record review. Review of history provided by patient. Review of collaborative information. Testing by Clinician. Review of raw data. Scoring. Report writing of individual tests administered by Clinician. Integration of individual tests administered by psychometrist with NSE/testing by clinician, review of records/history/collaborative information, case Conceptualization, treatment planning, clinical decision making, report writing, coordination Of Care. Psychometry test codes as time spent by psychometrist administering and scoring neurocognitive/psychological tests under supervision of neuropsychologist.  Integral services including scoring of raw data, data interpretation, case conceptualization, report writing etcetera were initiated after the patient finished testing/raw data collected and was completed on the date the report was signed. This note will not be viewable in [address]

## 2021-05-28 NOTE — PROGRESS NOTES
This note will not be viewable in [address] Darby Pitt is a 80 y.o. female and presents with Follow-up (routine 3 month f/u) Wale Rivas Subjective: 
 
Mrs. Tressa oPrtillo presents today for 3-month follow-up. Her history is significant for hypertension, hyperlipidemia, carotid atherosclerosis and coronary disease. She is doing well on her current medical regimen. She has had some progressive memory loss and formal psychological testing has indicated that she likely has Alzheimer's dementia. Her daughter Jamari Cortes is present with her today and notes that her symptoms have progressed. She denies shortness of breath, chest pain, palpitations, PND, orthopnea, or pedal edema. Past Medical History:  
Diagnosis Date  AK (actinic keratosis) 7/27/2017  Arteriosclerotic coronary artery disease 7/27/2017  Arthritis of right knee 7/27/2017  Back pain 7/27/2017  Carotid atherosclerosis 7/27/2017  Cough 7/27/2017  Fatigue 7/27/2017  History of pneumonia 7/27/2017  
 HTN (hypertension) 7/27/2017  Hyperlipidemia 7/27/2017  Hypertension  Memory loss 2/19/2021  Need for Streptococcus pneumoniae vaccination 7/27/2017  On statin therapy 7/27/2017  Plantar fasciitis 7/27/2017  Pneumonia  Shoulder sprain 7/27/2017  Syncope 7/27/2017 Past Surgical History:  
Procedure Laterality Date  HX GI    
 ruptured intestine No Known Allergies Current Outpatient Medications Medication Sig Dispense Refill  donepeziL (ARICEPT) 5 mg tablet Take 1 Tablet by mouth nightly. 30 Tablet 2  
 pravastatin (PRAVACHOL) 40 mg tablet TAKE 1 TABLET BY MOUTH EVERY NIGHT 90 Tab 3  
 valsartan-hydroCHLOROthiazide (DIOVAN-HCT) 160-12.5 mg per tablet TAKE 1/2 TABLET BY MOUTH DAILY 45 Tab 3  
 diclofenac (VOLTAREN) 1 % gel Apply 4 g to affected area four (4) times daily. 100 g 1  
 aspirin delayed-release 81 mg tablet Take  by mouth daily.     
 multivitamins-minerals-lutein (CENTRUM SILVER) Tab Take 1 Tab by mouth daily. Social History Socioeconomic History  Marital status: SINGLE Spouse name: Not on file  Number of children: Not on file  Years of education: Not on file  Highest education level: Not on file Tobacco Use  Smoking status: Never Smoker  Smokeless tobacco: Never Used Vaping Use  Vaping Use: Never used Substance and Sexual Activity  Alcohol use: No  
 Drug use: No  
 Sexual activity: Never Social Determinants of Health Financial Resource Strain:  Difficulty of Paying Living Expenses:   
Food Insecurity:  Worried About [address] in the Last Year:   
[address] in the Last Year:   
Transportation Needs:   
 Lack of Transportation (Medical):  Lack of Transportation (Non-Medical): Physical Activity:   
 Days of Exercise per Week:  Minutes of Exercise per Session:   
Stress:  Feeling of Stress :   
Social Connections:  Frequency of Communication with Friends and Family:  Frequency of Social Gatherings with Friends and Family:  Attends Mu-ism Services:  Active Member of Clubs or Organizations:  Attends Club or Organization Meetings:  Marital Status:   
 
Family History Problem Relation Age of Onset  Hypertension Mother  Heart Disease Father Review of Systems Constitutional:  negative for fevers, chills, anorexia and weight loss Eyes:    negative for visual disturbance and irritation ENT:    negative for tinnitus,sore throat,nasal congestion,ear pains. hoarseness Respiratory:     negative for cough, hemoptysis, dyspnea,wheezing CV:    negative for chest pain, palpitations, lower extremity edema GI:    negative for nausea, vomiting, diarrhea, abdominal pain,melena Endo:               negative for polyuria,polydipsia,polyphagia,heat intolerance Genitourinary : negative for frequency, dysuria and hematuria Integumentary: negative for rash and pruritus Hematologic:   negative for easy bruising and gum/nose bleeding Musculoskel:  negative for myalgias, arthralgias, back pain, muscle weakness, joint pain Neurological:   negative for headaches, dizziness, vertigo, memory problems and gait Behavl/Psych:  negative for feelings of anxiety, depression, mood changes ROS otherwise negative Objective: 
Visit Vitals /70 (BP 1 Location: Left arm, BP Patient Position: Sitting, BP Cuff Size: Large adult) Pulse 63 Temp 98.7 °F (37.1 °C) (Oral) Resp 16 Ht 5' 2\" (1.575 m) Wt 143 lb (64.9 kg) SpO2 98% BMI 26.16 kg/m² Physical Exam:  
General appearance - alert, well appearing, and in no distress Mental status - alert, oriented to person, place, and time EYE-PARESH, EOMI, fundi normal, corneas normal, no foreign bodies ENT-ENT exam normal, no neck nodes or sinus tenderness Nose - normal and patent, no erythema, discharge or polyps Mouth - mucous membranes moist, pharynx normal without lesions Neck - supple, no significant adenopathy Chest - clear to auscultation, no wheezes, rales or rhonchi, symmetric air entry Heart - normal rate, regular rhythm, normal S1, S2, no murmurs, rubs, clicks or gallops Abdomen - soft, nontender, nondistended, no masses or organomegaly Lymph- no adenopathy palpable Ext-peripheral pulses normal, no pedal edema, no clubbing or cyanosis Skin-Warm and dry. no hyperpigmentation, vitiligo, or suspicious lesions Neuro -alert, oriented, normal speech, no focal findings or movement disorder noted Assessment/Plan: 
Diagnoses and all orders for this visit: 1. Essential hypertension -     METABOLIC PANEL, COMPREHENSIVE; Future 2. On statin therapy 3. Pure hypercholesterolemia -     METABOLIC PANEL, COMPREHENSIVE; Future -     LIPID PANEL; Future 4. Memory loss 
-     donepeziL (ARICEPT) 5 mg tablet; Take 1 Tablet by mouth nightly.  
 
5. IGT (impaired glucose tolerance) 
-     HEMOGLOBIN A1C WITH EAG; Future 6. Weight loss 
-     CBC WITH AUTOMATED DIFF; Future 7. Late onset Alzheimer's dementia without behavioral disturbance (HCC) 
-     donepeziL (ARICEPT) 5 mg tablet; Take 1 Tablet by mouth nightly. ICD-10-CM ICD-9-CM 1. Essential hypertension  Y98 359.1 METABOLIC PANEL, COMPREHENSIVE 2. On statin therapy  Z79.899 V58.69 3. Pure hypercholesterolemia  S02.57 454.4 METABOLIC PANEL, COMPREHENSIVE  
   LIPID PANEL 4. Memory loss  R41.3 780.93 donepeziL (ARICEPT) 5 mg tablet 5. IGT (impaired glucose tolerance)  R73.02 790.22 HEMOGLOBIN A1C WITH EAG 6. Weight loss  R63.4 783.21 CBC WITH AUTOMATED DIFF 7. Late onset Alzheimer's dementia without behavioral disturbance (HCC)  G30.1 331.0 donepeziL (ARICEPT) 5 mg tablet F02.80 294.10 Plan: We will start Aricept 5 mg daily. If she tolerates this well we will titrate up to 10 mg. She has had early satiety and some weight loss and we will do some follow-up labs today including a CBC. I have reviewed with the patient details of the assessment and plan and all questions were answered. Relevent patient education was performed. Verbal and/or written instructions (see AVS) provided. The most recent lab findings were reviewed with the patient. Plan was discussed with patient who verbally expressed understanding. An After Visit Summary was printed and given to the patient.  
 
Dinah Metz MD

## 2021-05-28 NOTE — PROGRESS NOTES
1. Have you been to the ER, urgent care clinic since your last visit? Hospitalized since your last visit? No 
 
2. Have you seen or consulted any other health care providers outside of the [address] [place] [address] since your last visit? Include any pap smears or colon screening.  No

## 2021-06-03 NOTE — TELEPHONE ENCOUNTER
Patients daughter America Bolden calling stating mother started a new medication for memory issues last Saturday 5/29/21 and has started with the side effects nausea & diarrhea. The nausea stated on Sunday & Tuesday and the diarrhea was last night.      P# [phone]  Or [phone]    Ok to leave message

## 2021-06-23 NOTE — TELEPHONE ENCOUNTER
Patients daughter Virgil Mcelroy calling stating her mother is having diarrhea since she has start  taking medication aricept. The diarrhea happen after she has her breakfast.  She is also weaker and tires out real easy. She is taking this medication every other day. A good time to call her today would be between 2 & 3.

## 2021-06-28 NOTE — TELEPHONE ENCOUNTER
Spoke with patient's daughter, Tk Roberts and she does not feel that her mother would be able to keep up with applying a patch on alternating arms daily, due to the fact that she lives alone. Daughter states the Aricept is causing is not only causing diarrhea, but is also causing her mother to be more agitated. She would like her mother to discontinue the Aricept, see how she does without taking anything and reevaluate the situation at her next appointment on August 30th. Please advise.

## 2021-08-30 PROBLEM — G30.1 LATE ONSET ALZHEIMER'S DEMENTIA WITHOUT BEHAVIORAL DISTURBANCE (HCC): Status: ACTIVE | Noted: 2021-01-01

## 2021-08-30 PROBLEM — F02.80 LATE ONSET ALZHEIMER'S DEMENTIA WITHOUT BEHAVIORAL DISTURBANCE (HCC): Status: ACTIVE | Noted: 2021-01-01

## 2021-08-30 NOTE — PROGRESS NOTES
This is the Subsequent Medicare Annual Wellness Exam, performed 12 months or more after the Initial AWV or the last Subsequent AWV    I have reviewed the patient's medical history in detail and updated the computerized patient record. Assessment/Plan   Education and counseling provided:  Are appropriate based on today's review and evaluation    1. Medicare annual wellness visit, subsequent  2. Essential hypertension  3. Pulmonary hypertension, moderate to severe ([facility]  4. Arteriosclerotic coronary artery disease  5. Pure hypercholesterolemia  6. On statin therapy  7. Late onset Alzheimer's dementia without behavioral disturbance ([facility]  8. Screening for depression  -     DEPRESSION SCREEN ANNUAL       Depression Risk Factor Screening     3 most recent PHQ Screens 2/19/2021   Little interest or pleasure in doing things Not at all   Feeling down, depressed, irritable, or hopeless Not at all   Total Score PHQ 2 0       Alcohol Risk Screen    Do you average more than 1 drink per night or more than 7 drinks a week:  No    On any one occasion in the past three months have you have had more than 3 drinks containing alcohol:  No        Functional Ability and Level of Safety    Hearing: Hearing is good. Activities of Daily Living: The home contains: no safety equipment. Patient does total self care      Ambulation: with no difficulty     Fall Risk:  Fall Risk Assessment, last 12 mths 2/19/2021   Able to walk? Yes   Fall in past 12 months? 0   Do you feel unsteady? 0   Are you worried about falling 0   Number of falls in past 12 months -   Fall with injury?  -      Abuse Screen:  Patient is not abused       Cognitive Screening    Has your family/caregiver stated any concerns about your memory: yes - Evaluated and treated     Cognitive Screening: Abnormal - MMSE (Mini Mental Status Exam)    Health Maintenance Due     Health Maintenance Due   Topic Date Due    DTaP/Tdap/Td series (1 - Tdap) Never done    Shingrix Vaccine Age 50> (1 of 2) Never done    Bone Densitometry (Dexa) Screening  Never done    Pneumococcal 65+ years (2 of 2 - PPSV23) 02/14/2020       Patient Care Team   Patient Care Team:  Randi Mckay MD as PCP - General (Internal Medicine)  Randi Mckay MD as PCP [facility]  Hansa Dumas MD (Cardiology)    History     Patient Active Problem List   Diagnosis Code    Pneumonia- lobar J18.9    UTI (lower urinary tract infection) N39.0    Hyponatremia E87.1    Hyperglycemia R73.9    ASD (atrial septal defect) VS PFO on echo Q21.1    Pulmonary hypertension, moderate to severe- on echo I27.20    SOB (shortness of breath) R06.02    AK (actinic keratosis) L57.0    Arthritis of right knee M17.11    On statin therapy Z79.899    Arteriosclerotic coronary artery disease I25.10    Hyperlipidemia E78.5    Carotid atherosclerosis I65.29    HTN (hypertension) I10    History of pneumonia Z87.01    Cough R05    Back pain M54.9    Shoulder sprain S43.409A    Plantar fasciitis M72.2    Fatigue R53.83    Syncope R55    Memory loss R41.3    Late onset Alzheimer's dementia without behavioral disturbance (HCC) G30.1, F02.80     Past Medical History:   Diagnosis Date    AK (actinic keratosis) 7/27/2017    Arteriosclerotic coronary artery disease 7/27/2017    Arthritis of right knee 7/27/2017    Back pain 7/27/2017    Carotid atherosclerosis 7/27/2017    Cough 7/27/2017    Fatigue 7/27/2017    History of pneumonia 7/27/2017    HTN (hypertension) 7/27/2017    Hyperlipidemia 7/27/2017    Hypertension     Late onset Alzheimer's dementia without behavioral disturbance ([facility] 8/30/2021    Memory loss 2/19/2021    Need for Streptococcus pneumoniae vaccination 7/27/2017    On statin therapy 7/27/2017    Plantar fasciitis 7/27/2017    Pneumonia     Shoulder sprain 7/27/2017    Syncope 7/27/2017      Past Surgical History:   Procedure Laterality Date    HX GI      ruptured intestine     Current Outpatient Medications   Medication Sig Dispense Refill    rivastigmine (Exelon Patch) 4.6 mg/24 hour patch 1 Patch by TransDERmal route daily. 30 Patch 0    pravastatin (PRAVACHOL) 40 mg tablet TAKE 1 TABLET BY MOUTH EVERY NIGHT 90 Tab 3    valsartan-hydroCHLOROthiazide (DIOVAN-HCT) 160-12.5 mg per tablet TAKE 1/2 TABLET BY MOUTH DAILY 45 Tab 3    diclofenac (VOLTAREN) 1 % gel Apply 4 g to affected area four (4) times daily. 100 g 1    aspirin delayed-release 81 mg tablet Take  by mouth daily.  multivitamins-minerals-lutein (CENTRUM SILVER) Tab Take 1 Tab by mouth daily. No Known Allergies    Family History   Problem Relation Age of Onset    Hypertension Mother     Heart Disease Father      Social History     Tobacco Use    Smoking status: Never Smoker    Smokeless tobacco: Never Used   Substance Use Topics    Alcohol use: No         C Sage Mejia MD     This note will not be viewable in [facility]. Joe Byrd is a 80 y.o. female and presents with Follow-up (routine 3 month f/u)  . Subjective:  Mrs. Glen Schlatter presents today accompanied by her daughter for follow-up of several problems including hypertension, hyperlipidemia, monitoring statin therapy, and late onset Alzheimer's dementia. She was tried on Aricept but was unable to tolerate this because of GI side effects. She was unable to remember to place the Exelon patch. She is no longer driving. She is able to perform most ADLs. She is eating well. She is lost about 5 pounds since her last office visit. She has no shortness of breath, chest pain, palpitations, PND, orthopnea, or pedal edema.   Past Medical History:   Diagnosis Date    AK (actinic keratosis) 7/27/2017    Arteriosclerotic coronary artery disease 7/27/2017    Arthritis of right knee 7/27/2017    Back pain 7/27/2017    Carotid atherosclerosis 7/27/2017    Cough 7/27/2017    Fatigue 7/27/2017    History of pneumonia 7/27/2017    HTN (hypertension) 7/27/2017    Hyperlipidemia 7/27/2017    Hypertension     Late onset Alzheimer's dementia without behavioral disturbance ([facility] [address] 8/30/2021    Memory loss 2/19/2021    Need for Streptococcus pneumoniae vaccination 7/27/2017    On statin therapy 7/27/2017    Plantar fasciitis 7/27/2017    Pneumonia     Shoulder sprain 7/27/2017    Syncope 7/27/2017     Past Surgical History:   Procedure Laterality Date    HX GI      ruptured intestine     No Known Allergies  Current Outpatient Medications   Medication Sig Dispense Refill    rivastigmine (Exelon Patch) 4.6 mg/24 hour patch 1 Patch by TransDERmal route daily. 30 Patch 0    pravastatin (PRAVACHOL) 40 mg tablet TAKE 1 TABLET BY MOUTH EVERY NIGHT 90 Tab 3    valsartan-hydroCHLOROthiazide (DIOVAN-HCT) 160-12.5 mg per tablet TAKE 1/2 TABLET BY MOUTH DAILY 45 Tab 3    diclofenac (VOLTAREN) 1 % gel Apply 4 g to affected area four (4) times daily. 100 g 1    aspirin delayed-release 81 mg tablet Take  by mouth daily.  multivitamins-minerals-lutein (CENTRUM SILVER) Tab Take 1 Tab by mouth daily. Social History     Socioeconomic History    Marital status: SINGLE     Spouse name: Not on file    Number of children: Not on file    Years of education: Not on file    Highest education level: Not on file   Tobacco Use    Smoking status: Never Smoker    Smokeless tobacco: Never Used   Vaping Use    Vaping Use: Never used   Substance and Sexual Activity    Alcohol use: No    Drug use: No    Sexual activity: Never     Social Determinants of Health     Financial Resource Strain:     Difficulty of Paying Living Expenses:    Food Insecurity:     Worried About Running Out of Food in the Last Year:     [address] in the Last Year:    Transportation Needs:     Lack of Transportation (Medical):      Lack of Transportation (Non-Medical):    Physical Activity:     Days of Exercise per Week:     Minutes of Exercise per Session: Stress:     Feeling of Stress :    Social Connections:     Frequency of Communication with Friends and Family:     Frequency of Social Gatherings with Friends and Family:     Attends [employer] Services:     Active Member of Clubs or Organizations:     Attends Club or Organization Meetings:     Marital Status:      Family History   Problem Relation Age of Onset    Hypertension Mother     Heart Disease Father        Review of Systems  Constitutional:  negative for fevers, chills, anorexia and weight loss  Eyes:    negative for visual disturbance and irritation  ENT:    negative for tinnitus,sore throat,nasal congestion,ear pains. hoarseness  Respiratory:     negative for cough, hemoptysis, dyspnea,wheezing  CV:    negative for chest pain, palpitations, lower extremity edema  GI:    negative for nausea, vomiting, diarrhea, abdominal pain,melena  Endo:               negative for polyuria,polydipsia,polyphagia,heat intolerance  Genitourinary : negative for frequency, dysuria and hematuria  Integumentary: negative for rash and pruritus  Hematologic:   negative for easy bruising and gum/nose bleeding  Musculoskel:  negative for myalgias, arthralgias, back pain, muscle weakness, joint pain  Neurological:   negative for headaches, dizziness, vertigo, memory problems and gait   Behavl/Psych:  negative for feelings of anxiety, depression, mood changes  ROS otherwise negative      Objective:  Visit Vitals  BP (!) 122/47 (BP 1 Location: Left arm, BP Patient Position: Sitting, BP Cuff Size: Large adult)   Pulse 73   Resp 18   Ht 5' 2\" (1.575 m)   Wt 137 lb (62.1 kg)   SpO2 96%   BMI 25.06 kg/m²     Physical Exam:   General appearance - alert, well appearing, and in no distress  Mental status - alert, oriented to person, place, and time  EYE-PARESH, EOMI, fundi normal, corneas normal, no foreign bodies  ENT-ENT exam normal, no neck nodes or sinus tenderness  Nose - normal and patent, no erythema, discharge or polyps  Mouth - mucous membranes moist, pharynx normal without lesions  Neck - supple, no significant adenopathy, faint carotid bruit right  Chest - clear to auscultation, no wheezes, rales or rhonchi, symmetric air entry   Heart - normal rate, regular rhythm, normal S1, S2, no murmurs, rubs, clicks or gallops   Abdomen - soft, nontender, nondistended, no masses or organomegaly  Lymph- no adenopathy palpable  Ext-peripheral pulses normal, no pedal edema, no clubbing or cyanosis  Skin-Warm and dry. no hyperpigmentation, vitiligo, or suspicious lesions  Neuro -alert, oriented, normal speech, no focal findings or movement disorder noted      Assessment/Plan:  Diagnoses and all orders for this visit:    1. Medicare annual wellness visit, subsequent    2. Essential hypertension    3. Pulmonary hypertension, moderate to severe ([facility] [address]    4. Arteriosclerotic coronary artery disease    5. Pure hypercholesterolemia    6. On statin therapy    7. Late onset Alzheimer's dementia without behavioral disturbance ([facility] [address]    8. Screening for depression  -     Lisa Stevens          ICD-10-CM ICD-9-CM    1. Medicare annual wellness visit, subsequent  Z00.00 V70.0    2. Essential hypertension  I10 401.9    3. Pulmonary hypertension, moderate to severe (HCC)  I27.20 416.8    4. Arteriosclerotic coronary artery disease  I25.10 414.00    5. Pure hypercholesterolemia  E78.00 272.0    6. On statin therapy  Z79.899 V58.69    7. Late onset Alzheimer's dementia without behavioral disturbance (HCC)  G30.1 331.0     F02.80 294.10    8. Screening for depression  Z13.31 V79.0 DEPRESSION SCREEN ANNUAL     Plan:    Patient is doing well on her current medical regimen. She had labs done recently that were stable and will not be repeated today. She does have late onset Alzheimer's dementia but has been intolerant of medications for this.   She is otherwise doing very well and I think it is better to not give her medications at this time because of side effects. She will continue her current medicines and follow-up as planned in 6 months. Follow-up and Dispositions    · Return in about 6 months (around 2/28/2022) for follow up. I have reviewed with the patient details of the assessment and plan and all questions were answered. Relevent patient education was performed. Verbal and/or written instructions (see AVS) provided. The most recent lab findings were reviewed with the patient. Plan was discussed with patient who verbally expressed understanding. An After Visit Summary was printed and given to the patient.     Kwadwo Lopez MD

## 2021-08-30 NOTE — PROGRESS NOTES
1. Have you been to the ER, urgent care clinic since your last visit? Hospitalized since your last visit? No    2. Have you seen or consulted any other health care providers outside of the [address] [place] [address] since your last visit? Include any pap smears or colon screening.  No

## 2021-08-30 NOTE — PATIENT INSTRUCTIONS

## 2021-11-04 NOTE — TELEPHONE ENCOUNTER
Received call from Mishel Garza at [facility] with Red Flag Complaint. Brief description of triage: extreme fatigue, sleeping a lot, stomach pain, no desire to eat. Has been 3 days, does have dementia, daughter thinks she may have a UTI or some type of virus     Not able to triage, patient not available to triage, transferred to the office for scheduling. Care advice provided, patient verbalizes understanding; denies any other questions or concerns; instructed to call back for any new or worsening symptoms. Writer provided warm transfer to [place] at [facility] for appointment scheduling. Attention Provider: Thank you for allowing me to participate in the care of your patient. The patient was connected to triage in response to information provided to the ECC. Please do not respond through this encounter as the response is not directed to a shared pool.       Reason for Disposition   Requesting regular office appointment    Protocols used: INFORMATION ONLY CALL - NO TRIAGE-ADULT-

## 2021-11-05 NOTE — PROGRESS NOTES
Neda Shaw is a 80 y.o. female and presents with Fatigue  . Subjective:    Mrs. Suzy Chavis presents today accompanied by her daughter with complaint of weight loss, intermittent abdominal discomfort, and right hand weakness. She seems to be focused more so on weakness in her right hand. She notes that she drops things often with her right hand. She states this started all of a sudden. She denies any weakness of her lower extremities. She denies any falls. Her history is significant for hypertension and hyperlipidemia. She also has a history of late onset Alzheimer's dementia for which she is now on an Exelon patch. She denies any shortness of breath, chest pain, palpitations, PND, orthopnea, or pedal edema. Past Medical History:   Diagnosis Date    AK (actinic keratosis) 7/27/2017    Arteriosclerotic coronary artery disease 7/27/2017    Arthritis of right knee 7/27/2017    Back pain 7/27/2017    Carotid atherosclerosis 7/27/2017    Cough 7/27/2017    Fatigue 7/27/2017    History of pneumonia 7/27/2017    HTN (hypertension) 7/27/2017    Hyperlipidemia 7/27/2017    Hypertension     Late onset Alzheimer's dementia without behavioral disturbance ([facility] [address] 8/30/2021    Memory loss 2/19/2021    Need for Streptococcus pneumoniae vaccination 7/27/2017    On statin therapy 7/27/2017    Plantar fasciitis 7/27/2017    Pneumonia     Shoulder sprain 7/27/2017    Syncope 7/27/2017     Past Surgical History:   Procedure Laterality Date    HX GI      ruptured intestine     No Known Allergies  Current Outpatient Medications   Medication Sig Dispense Refill    rivastigmine (Exelon Patch) 4.6 mg/24 hour patch 1 Patch by TransDERmal route daily.  30 Patch 0    pravastatin (PRAVACHOL) 40 mg tablet TAKE 1 TABLET BY MOUTH EVERY NIGHT 90 Tab 3    valsartan-hydroCHLOROthiazide (DIOVAN-HCT) 160-12.5 mg per tablet TAKE 1/2 TABLET BY MOUTH DAILY 45 Tab 3    diclofenac (VOLTAREN) 1 % gel Apply 4 g to affected area four (4) times daily. 100 g 1    aspirin delayed-release 81 mg tablet Take  by mouth daily.  multivitamins-minerals-lutein (CENTRUM SILVER) Tab Take 1 Tab by mouth daily. Social History     Socioeconomic History    Marital status: SINGLE   Tobacco Use    Smoking status: Never Smoker    Smokeless tobacco: Never Used   Vaping Use    Vaping Use: Never used   Substance and Sexual Activity    Alcohol use: No    Drug use: No    Sexual activity: Never     Family History   Problem Relation Age of Onset    Hypertension Mother     Heart Disease Father        Review of Systems  Constitutional:  negative for fevers, chills, anorexia and weight loss  Eyes:    negative for visual disturbance and irritation  ENT:    negative for tinnitus,sore throat,nasal congestion,ear pains. hoarseness  Respiratory:     negative for cough, hemoptysis, dyspnea,wheezing  CV:    negative for chest pain, palpitations, lower extremity edema  GI:    negative for nausea, vomiting, diarrhea, abdominal pain,melena  Endo:               negative for polyuria,polydipsia,polyphagia,heat intolerance  Genitourinary : negative for frequency, dysuria and hematuria  Integumentary: negative for rash and pruritus  Hematologic:   negative for easy bruising and gum/nose bleeding  Musculoskel:  negative for myalgias, arthralgias, back pain, muscle weakness, joint pain  Neurological:   negative for headaches, dizziness, vertigo, memory problems and gait   Behavl/Psych:  negative for feelings of anxiety, depression, mood changes  ROS otherwise negative      Objective:  Visit Vitals  /66 (BP 1 Location: Left arm, BP Patient Position: Sitting, BP Cuff Size: Large adult)   Pulse 73   Resp 18   Ht 5' 2\" (1.575 m)   Wt 130 lb (59 kg)   BMI 23.78 kg/m²     Physical Exam:   General appearance - alert, well appearing, and in no distress  Mental status - alert, oriented to person, place, and time  EYE-PARESH, EOMI, fundi normal, corneas normal, no foreign bodies  ENT-ENT exam normal, no neck nodes or sinus tenderness  Nose - normal and patent, no erythema, discharge or polyps  Mouth - mucous membranes moist, pharynx normal without lesions  Neck - supple, no significant adenopathy   Chest - clear to auscultation, no wheezes, rales or rhonchi, symmetric air entry   Heart - normal rate, regular rhythm, normal S1, S2, no murmurs, rubs, clicks or gallops   Abdomen - soft, nontender, nondistended, no masses or organomegaly  Lymph- no adenopathy palpable  Ext-peripheral pulses normal, no pedal edema, no clubbing or cyanosis  Skin-Warm and dry. no hyperpigmentation, vitiligo, or suspicious lesions  Neuro -alert, oriented, normal speech, finger-to-nose intact bilaterally, rapid alternating movement intact, gait is normal, interosseous strength of the right hand mildly diminished compared to the left with some atrophy      Assessment/Plan:  Diagnoses and all orders for this visit:    1. Generalized abdominal pain  -     CBC WITH AUTOMATED DIFF; Future  -     METABOLIC PANEL, COMPREHENSIVE; Future  -     URINALYSIS W/ REFLEX CULTURE; Future  -     LIPASE; Future    2. Fatigue, unspecified type  -     CBC WITH AUTOMATED DIFF; Future  -     TSH 3RD GENERATION; Future    3. Primary hypertension    4. Pulmonary hypertension, moderate to severe- on echo    5. Late onset Alzheimer's dementia without behavioral disturbance ([facility]          ICD-10-CM ICD-9-CM    1. Generalized abdominal pain  R10.84 789.07 CBC WITH AUTOMATED DIFF      METABOLIC PANEL, COMPREHENSIVE      URINALYSIS W/ REFLEX CULTURE      LIPASE   2. Fatigue, unspecified type  R53.83 780.79 CBC WITH AUTOMATED DIFF      TSH 3RD GENERATION   3. Primary hypertension  I10 401.9    4. Pulmonary hypertension, moderate to severe- on echo  I27.20 416.8    5.  Late onset Alzheimer's dementia without [facility] disturbance [facility]  G30.1 331.0     F02.80 294.10        Plan:    Is not clear if the patient has had a small stroke with weakness of the right upper extremity. She has some interosseous muscle weakness and atrophy which may suggest a distal neuropathy of some type. Of course given her advanced age and current condition I do not think any invasive test would be helpful at this point time. With regard to her other symptoms which are nonspecific this could be medication related or could possibly be a harbinger some underlying issues. She will have additional labs done today for further evaluation. We will try to avoid any invasive test based on her advanced age and underlying dementia but may need to make some decisions about further testing if symptoms progress. I have reviewed with the patient details of the assessment and plan and all questions were answered. Relevent patient education was performed. Verbal and/or written instructions (see AVS) provided. The most recent lab findings were reviewed with the patient. Plan was discussed with patient who verbally expressed understanding. An After Visit Summary was printed and given to the patient.     Autumn Peraza MD

## 2021-11-05 NOTE — PROGRESS NOTES
1. Have you been to the ER, urgent care clinic since your last visit? Hospitalized since your last visit? No    2. Have you seen or consulted any other health care providers outside of the [address] [place] [address] since your last visit? Include any pap smears or colon screening.  No

## 2021-11-11 NOTE — PROGRESS NOTES
Lab results show anemia. Glucose mildly elevated. Urine analysis was clear. Should have follow-up office visit to reevaluate anemia and decide on any action going forward.

## 2021-11-15 NOTE — PROGRESS NOTES
Veronika Pedroza is a 80 y.o. female and presents with Anemia  . Subjective:  Mrs. Anne Parrish presents today for follow-up of recent abnormal lab results with anemia. Her hemoglobin was 8.9. She is on a baby aspirin daily. She has been fatigued and short of breath. She denies any abdominal pain. She denies any melena or hematochezia. She is not taking any over-the-counter NSAIDs. Her weight has decreased by 6 pounds since August.  Her daughter notes that her appetite is not as good. She has had at least 2 falls over the past several months. Her daughter bought her a Rollator walker but she does not like to use it. Past Medical History:   Diagnosis Date    AK (actinic keratosis) 7/27/2017    Arteriosclerotic coronary artery disease 7/27/2017    Arthritis of right knee 7/27/2017    Back pain 7/27/2017    Carotid atherosclerosis 7/27/2017    Cough 7/27/2017    Fatigue 7/27/2017    History of pneumonia 7/27/2017    HTN (hypertension) 7/27/2017    Hyperlipidemia 7/27/2017    Hypertension     Late onset Alzheimer's dementia without behavioral disturbance ([facility] [address] 8/30/2021    Memory loss 2/19/2021    Need for Streptococcus pneumoniae vaccination 7/27/2017    On statin therapy 7/27/2017    Plantar fasciitis 7/27/2017    Pneumonia     Shoulder sprain 7/27/2017    Syncope 7/27/2017     Past Surgical History:   Procedure Laterality Date    HX GI      ruptured intestine     No Known Allergies  Current Outpatient Medications   Medication Sig Dispense Refill    rivastigmine (Exelon Patch) 4.6 mg/24 hour patch 1 Patch by TransDERmal route daily. 30 Patch 0    pravastatin (PRAVACHOL) 40 mg tablet TAKE 1 TABLET BY MOUTH EVERY NIGHT 90 Tab 3    valsartan-hydroCHLOROthiazide (DIOVAN-HCT) 160-12.5 mg per tablet TAKE 1/2 TABLET BY MOUTH DAILY 45 Tab 3    diclofenac (VOLTAREN) 1 % gel Apply 4 g to affected area four (4) times daily.  100 g 1    aspirin delayed-release 81 mg tablet Take  by mouth daily.      multivitamins-minerals-lutein (CENTRUM SILVER) Tab Take 1 Tab by mouth daily. Social History     Socioeconomic History    Marital status: SINGLE   Tobacco Use    Smoking status: Never Smoker    Smokeless tobacco: Never Used   Vaping Use    Vaping Use: Never used   Substance and Sexual Activity    Alcohol use: No    Drug use: No    Sexual activity: Never     Family History   Problem Relation Age of Onset    Hypertension Mother     Heart Disease Father        Review of Systems  Constitutional:  negative for fevers, chills, anorexia and weight loss  Eyes:    negative for visual disturbance and irritation  ENT:    negative for tinnitus,sore throat,nasal congestion,ear pains. hoarseness  Respiratory:     negative for cough, hemoptysis, dyspnea,wheezing  CV:    negative for chest pain, palpitations, lower extremity edema  GI:    negative for nausea, vomiting, diarrhea, abdominal pain,melena  Endo:               negative for polyuria,polydipsia,polyphagia,heat intolerance  Genitourinary : negative for frequency, dysuria and hematuria  Integumentary: negative for rash and pruritus  Hematologic:   negative for easy bruising and gum/nose bleeding  Musculoskel:  negative for myalgias, arthralgias, back pain, muscle weakness, joint pain  Neurological:   negative for headaches, dizziness, vertigo, memory problems and gait   Behavl/Psych:  negative for feelings of anxiety, depression, mood changes  ROS otherwise negative      Objective:  Visit Vitals  /66 (BP 1 Location: Left arm, BP Patient Position: Sitting, BP Cuff Size: Large adult)   Pulse 74   Resp 18   Ht 5' 2\" (1.575 m)   Wt 131 lb (59.4 kg)   SpO2 97%   BMI 23.96 kg/m²     Physical Exam:   General appearance - alert, well appearing, and in no distress  Mental status - alert, oriented to person, place, and time  EYE-PARESH, EOMI, fundi normal, corneas normal, no foreign bodies  ENT-ENT exam normal, no neck nodes or sinus tenderness  Nose - normal and patent, no erythema, discharge or polyps  Mouth - mucous membranes moist, pharynx normal without lesions  Neck - supple, no significant adenopathy   Chest - clear to auscultation, no wheezes, rales or rhonchi, symmetric air entry   Heart - normal rate, regular rhythm, normal S1, S2, no murmurs, rubs, clicks or gallops   Abdomen - soft, nontender, nondistended, no masses or organomegaly  Lymph- no adenopathy palpable  Ext-peripheral pulses normal, no pedal edema, no clubbing or cyanosis  Skin-Warm and dry. no hyperpigmentation, vitiligo, or suspicious lesions  Neuro -alert, oriented, normal speech, no focal findings or movement disorder noted      Assessment/Plan:  Diagnoses and all orders for this visit:    1. Anemia, unspecified type  -     CBC WITH AUTOMATED DIFF; Future  -     FERRITIN; Future  -     VITAMIN B12; Future  -     FOLATE; Future  -     RETICULOCYTE COUNT; Future    2. B12 deficiency  -     VITAMIN B12; Future  -     FOLATE; Future    3. Iron deficiency anemia, unspecified iron deficiency anemia type  -     FERRITIN; Future    4. Essential hypertension  -     URINALYSIS W/ REFLEX CULTURE; Future          ICD-10-CM ICD-9-CM    1. Anemia, unspecified type  D64.9 285.9 CBC WITH AUTOMATED DIFF      FERRITIN      VITAMIN B12      FOLATE      RETICULOCYTE COUNT      RETICULOCYTE COUNT      FOLATE      VITAMIN B12      FERRITIN      CBC WITH AUTOMATED DIFF   2. B12 deficiency  E53.8 266.2 VITAMIN B12      FOLATE      FOLATE      VITAMIN B12   3. Iron deficiency anemia, unspecified iron deficiency anemia type  D50.9 280.9 FERRITIN      FERRITIN   4. Essential hypertension  I10 401.9 URINALYSIS W/ REFLEX CULTURE       Plan:    Anemia of uncertain etiology. Will check iron and D81 and folic acid levels as well as reticulocyte count. Repeat a CBC. She has had some very minor weight loss but has had a decreased p.o. intake. Her fatigability and shortness of breath may also be related.   We will make further recommendations based on these results. I have reviewed with the patient details of the assessment and plan and all questions were answered. Relevent patient education was performed. Verbal and/or written instructions (see AVS) provided. The most recent lab findings were reviewed with the patient. Plan was discussed with patient who verbally expressed understanding. An After Visit Summary was printed and given to the patient.     Cristobal Ling MD

## 2021-11-15 NOTE — PROGRESS NOTES
1. Have you been to the ER, urgent care clinic since your last visit? Hospitalized since your last visit? No    2. Have you seen or consulted any other health care providers outside of the [address] [place] [address] since your last visit? Include any pap smears or colon screening.  No

## 2021-11-16 PROBLEM — K92.2 GI BLEED: Status: ACTIVE | Noted: 2021-01-01

## 2021-11-16 PROBLEM — D62 ACUTE BLOOD LOSS ANEMIA: Status: ACTIVE | Noted: 2021-01-01

## 2021-11-16 NOTE — PROGRESS NOTES
I saw and examined the patient  I spoke to the daughter    Full attestation to follow    Florin Cervantes MD  5:05 PM  11/16/2021

## 2021-11-16 NOTE — PROGRESS NOTES
Called patient at home number and unable to reach. Contacted daughter Salma Francisco and notified of critical lab value. Anemia with hemoglobin is 7.5. As patient is symptomatic with shortness of breath would recommend hospitalization for further evaluation and treatment. Directed to ER.

## 2021-11-16 NOTE — ED PROVIDER NOTES
EMERGENCY DEPARTMENT HISTORY AND PHYSICAL EXAM      Date: 11/16/2021  Patient Name: Brittany Garza    History of Presenting Illness     Chief Complaint   Patient presents with    Abnormal Lab Results     blood work yesterday revealed anemia and potential  need for blood transfusion. pt is symptomatic with weakness and sob         HPI: Brittany Garza, 80 y.o. female presents to the ED with cc of abnormal laboratory work. She was sent in by her primary care doctor who saw her yesterday, and found to be anemic. Patient has a history of dementia, so history also obtained from her daughter at bedside. Over the past several months she has been getting progressively weaker per her daughter, as well as seeming more short of breath on exertion. She denies any abdominal pain or vomiting, does state that her stool has been \"all kinds of colors\" but does not consistently elaborate further, her daughter states that she has not remarked of any melanotic stools. She denies any dysuria or hematuria. She does not take any blood thinners. Does take a daily aspirin. There are no other complaints, changes, or physical findings at this time. PCP: Sandra Ceballos MD    No current facility-administered medications on file prior to encounter. Current Outpatient Medications on File Prior to Encounter   Medication Sig Dispense Refill    pravastatin (PRAVACHOL) 40 mg tablet TAKE 1 TABLET BY MOUTH EVERY NIGHT 90 Tab 3    valsartan-hydroCHLOROthiazide (DIOVAN-HCT) 160-12.5 mg per tablet TAKE 1/2 TABLET BY MOUTH DAILY 45 Tab 3    aspirin delayed-release 81 mg tablet Take  by mouth daily.  multivitamins-minerals-lutein (CENTRUM SILVER) Tab Take 1 Tab by mouth daily.  [DISCONTINUED] rivastigmine (Exelon Patch) 4.6 mg/24 hour patch 1 Patch by TransDERmal route daily. 30 Patch 0    [DISCONTINUED] diclofenac (VOLTAREN) 1 % gel Apply 4 g to affected area four (4) times daily.  100 g 1       Past History Past Medical History:  Past Medical History:   Diagnosis Date    AK (actinic keratosis) 7/27/2017    Arteriosclerotic coronary artery disease 7/27/2017    Arthritis of right knee 7/27/2017    Back pain 7/27/2017    Carotid atherosclerosis 7/27/2017    Cough 7/27/2017    Fatigue 7/27/2017    History of pneumonia 7/27/2017    HTN (hypertension) 7/27/2017    Hyperlipidemia 7/27/2017    Hypertension     Late onset Alzheimer's dementia without behavioral disturbance ([facility] [address] 8/30/2021    Memory loss 2/19/2021    Need for Streptococcus pneumoniae vaccination 7/27/2017    On statin therapy 7/27/2017    Plantar fasciitis 7/27/2017    Pneumonia     Shoulder sprain 7/27/2017    Syncope 7/27/2017       Past Surgical History:  Past Surgical History:   Procedure Laterality Date    HX GI      ruptured intestine       Family History:  Family History   Problem Relation Age of Onset    Hypertension Mother     Heart Disease Father        Social History:  Social History     Tobacco Use    Smoking status: Never Smoker    Smokeless tobacco: Never Used   Vaping Use    Vaping Use: Never used   Substance Use Topics    Alcohol use: No    Drug use: No       Allergies:  No Known Allergies      Review of Systems   no fever  No ear pain  No eye pain  Reports exertional shortness of breath  no chest pain  no abdominal pain  no dysuria  no leg pain  No rash  No lymphadenopathy  No weight gain    Physical Exam   Physical Exam  Constitutional:       General: She is not in acute distress. Appearance: She is not toxic-appearing. HENT:      Head: Normocephalic. Eyes:      Extraocular Movements: Extraocular movements intact. Cardiovascular:      Rate and Rhythm: Normal rate and regular rhythm. Pulmonary:      Effort: Pulmonary effort is normal.      Breath sounds: Normal breath sounds. Abdominal:      Palpations: Abdomen is soft. Tenderness: There is no abdominal tenderness.    Genitourinary:     Comments: Brown stool on rectal, no masses or tenderness  Musculoskeletal:         General: No tenderness or deformity. Cervical back: Neck supple. Skin:     General: Skin is warm and dry. Neurological:      General: No focal deficit present. Mental Status: She is alert. Psychiatric:         Mood and Affect: Mood normal.         Diagnostic Study Results     Labs -     Recent Results (from the past 24 hour(s))   EKG, 12 LEAD, INITIAL    Collection Time: 11/16/21 10:23 AM   Result Value Ref Range    Ventricular Rate 66 BPM    Atrial Rate 66 BPM    P-R Interval 172 ms    QRS Duration 106 ms    Q-T Interval 406 ms    QTC Calculation (Bezet) 425 ms    Calculated P Axis 74 degrees    Calculated R Axis 3 degrees    Calculated T Axis 39 degrees    Diagnosis       Normal sinus rhythm  Minimal voltage criteria for LVH, may be normal variant  Nonspecific intraventricular conduction delay  When compared with ECG of 17-OCT-2020 10:32,    Nonspecific T wave abnormality, improved in Inferior leads  Confirmed by [facility] ([id number]) on 11/16/2021 11:04:00 AM     CBC WITH AUTOMATED DIFF    Collection Time: 11/16/21 10:24 AM   Result Value Ref Range    WBC 7.1 3.6 - 11.0 K/uL    RBC 2.95 (L) 3.80 - 5.20 M/uL    HGB 7.2 (L) 11.5 - 16.0 g/dL    HCT 24.3 (L) 35.0 - 47.0 %    MCV 82.4 80.0 - 99.0 FL    MCH 24.4 (L) 26.0 - 34.0 PG    MCHC 29.6 (L) 30.0 - 36.5 g/dL    RDW 16.0 (H) 11.5 - 14.5 %    PLATELET 917 613 - 383 K/uL    MPV 9.7 8.9 - 12.9 FL    NRBC 0.0 0  WBC    ABSOLUTE NRBC 0.00 0.00 - 0.01 K/uL    NEUTROPHILS 78 (H) 32 - 75 %    LYMPHOCYTES 14 12 - 49 %    MONOCYTES 7 5 - 13 %    EOSINOPHILS 1 0 - 7 %    BASOPHILS 0 0 - 1 %    IMMATURE GRANULOCYTES 0 0.0 - 0.5 %    ABS. NEUTROPHILS 5.5 1.8 - 8.0 K/UL    ABS. LYMPHOCYTES 1.0 0.8 - 3.5 K/UL    ABS. MONOCYTES 0.5 0.0 - 1.0 K/UL    ABS. EOSINOPHILS 0.1 0.0 - 0.4 K/UL    ABS. BASOPHILS 0.0 0.0 - 0.1 K/UL    ABS. IMM.  GRANS. 0.0 0.00 - 0.04 K/UL    DF AUTOMATED METABOLIC PANEL, COMPREHENSIVE    Collection Time: 11/16/21 10:24 AM   Result Value Ref Range    Sodium 137 136 - 145 mmol/L    Potassium 3.7 3.5 - 5.1 mmol/L    Chloride 102 97 - 108 mmol/L    CO2 27 21 - 32 mmol/L    Anion gap 8 5 - 15 mmol/L    Glucose 253 (H) 65 - 100 mg/dL    BUN 24 (H) 6 - 20 MG/DL    Creatinine 1.01 0.55 - 1.02 MG/DL    BUN/Creatinine ratio 24 (H) 12 - 20      GFR est AA >60 >60 ml/min/1.73m2    GFR est non-AA 52 (L) >60 ml/min/1.73m2    Calcium 8.8 8.5 - 10.1 MG/DL    Bilirubin, total 0.2 0.2 - 1.0 MG/DL    ALT (SGPT) 14 12 - 78 U/L    AST (SGOT) 10 (L) 15 - 37 U/L    Alk. phosphatase 84 45 - 117 U/L    Protein, total 6.7 6.4 - 8.2 g/dL    Albumin 3.1 (L) 3.5 - 5.0 g/dL    Globulin 3.6 2.0 - 4.0 g/dL    A-G Ratio 0.9 (L) 1.1 - 2.2     OCCULT BLOOD, STOOL    Collection Time: 11/16/21 10:34 AM   Result Value Ref Range    Occult blood, stool Positive (A) NEG         Radiologic Studies -   No orders to display     CT Results  (Last 48 hours)    None        CXR Results  (Last 48 hours)    None            Medical Decision Making   I am the first provider for this patient. I reviewed the vital signs, available nursing notes, past medical history, past surgical history, family history and social history. Vital Signs-Reviewed the patient's vital signs. Patient Vitals for the past 24 hrs:   Temp Pulse Resp BP SpO2   11/16/21 1231 98.5 °F (36.9 °C) 65 21 (!) 166/56 100 %   11/16/21 1144 -- 68 21 (!) 151/41 99 %   11/16/21 1007 97.8 °F (36.6 °C) 71 16 (!) 131/54 100 %         Provider Notes (Medical Decision Making):   66-year-old female presenting with anemia and weakness. Concern for symptomatic anemia, possible GI bleed. Will assess for any associated electrolyte or metabolic abnormalities or arrhythmia. ED Course:     Initial assessment performed. The patients presenting problems have been discussed, and they are in agreement with the care plan formulated and outlined with them.   I have encouraged them to ask questions as they arise throughout their visit. ED Course as of 11/16/21 1246   Tue Nov 16, 2021   2888 I have discussed with the patient and daughter the rationale for blood component transfusion; its benefits in treating or preventing fatigue, organ damage, or death; and its risk which includes mild transfusion reactions, rare risk of blood borne infection, or more serious but rare reactions. I have discussed the alternatives to transfusion, including the risk and consequences of not receiving transfusion. The patient and daughter had an opportunity to ask questions and had agreed to proceed with transfusion of blood components. [CM]      ED Course User Index  [CM] Jayson-Mary Coyle MD      EKG is performed at 10: 23, shows sinus rhythm at a rate of 66, , , QTc 425, axis upright, no ST segment elevation or depression concerning for ACS, this is interpreted as normal sinus rhythm. CBC shows anemia hemoglobin of 7.2, I have discussed with the hospitalist Dr. Ellie Lagunas who agrees patient warrants blood transfusion given symptomatic anemia. Basic metabolic panel with normal renal function, hyperglycemic to 53, otherwise no worrisome electrolyte abnormalities. She is Hemoccult positive. She will be admitted. Critical Care Time:         Disposition:  Admit    PLAN:  1. Current Discharge Medication List        2.    Follow-up Information    None       Return to ED if worse     Diagnosis     Clinical Impression: Acute symptomatic anemia

## 2021-11-16 NOTE — H&P
Hospitalist Admission Note    NAME: Dianne Dumont   :  1932   MRN:  [id number]     Date/Time:  2021 12:56 PM    Patient PCP: Lizbeth Gilliam MD  ______________________________________________________________________  Given the patient's current clinical presentation, I have a high level of concern for decompensation if discharged from the emergency department. Complex decision making was performed, which includes reviewing the patient's available past medical records, laboratory results, and x-ray films. My assessment of this patient's clinical condition and my plan of care is as follows. Assessment / Plan:  Symptomatic acute blood loss anemia  Iron deficiency anemia  GI bleed  Alzheimer disease  Hypertension  We will admit to telemetry floor, a unit of PRBC ordered, H&H every 8 hours for 3 occurrences. Will continue Protonix 40 mg twice daily  Ferritin is at 11, will order IV iron for 3 days   GI  consulted  Resume BP meds    Code Status: DNR  Surrogate Decision Maker: Daughter    DVT Prophylaxis: SCDs  GI Prophylaxis: not indicated    Baseline: Ambulatory      Subjective:   CHIEF COMPLAINT: Shortness of breath for few months    HISTORY OF PRESENT ILLNESS:     Santo Andrade is a 80 y.o.  female with past medical history of hypertension, hyperlipidemia and Alzheimer disease who was sent to the ED by PCP for ongoing shortness of breath for few months  with generalized weakness. In the ED, vital signs were stable. Her labs revealed hemoglobin of 7.2. BMP is unremarkable  Review of system limited as patient has a dementia. Most of the HPI obtained from the daughter who was at bedside    We were asked to admit for work up and evaluation of the above problems.      Past Medical History:   Diagnosis Date    AK (actinic keratosis) 2017    Arteriosclerotic coronary artery disease 2017    Arthritis of right knee 2017    Back pain 2017    Carotid atherosclerosis 7/27/2017    Cough 7/27/2017    Fatigue 7/27/2017    History of pneumonia 7/27/2017    HTN (hypertension) 7/27/2017    Hyperlipidemia 7/27/2017    Hypertension     Late onset Alzheimer's dementia without behavioral disturbance ([facility] [address] 8/30/2021    Memory loss 2/19/2021    Need for Streptococcus pneumoniae vaccination 7/27/2017    On statin therapy 7/27/2017    Plantar fasciitis 7/27/2017    Pneumonia     Shoulder sprain 7/27/2017    Syncope 7/27/2017        Past Surgical History:   Procedure Laterality Date    HX GI      ruptured intestine       Social History     Tobacco Use    Smoking status: Never Smoker    Smokeless tobacco: Never Used   Substance Use Topics    Alcohol use: No        Family History   Problem Relation Age of Onset    Hypertension Mother     Heart Disease Father      No Known Allergies     Prior to Admission medications    Medication Sig Start Date End Date Taking? Authorizing Provider   pravastatin (PRAVACHOL) 40 mg tablet TAKE 1 TABLET BY MOUTH EVERY NIGHT 4/19/21  Yes Epharim Islas MD   valsartan-hydroCHLOROthiazide (DIOVAN-HCT) 160-12.5 mg per tablet TAKE 1/2 TABLET BY MOUTH DAILY 4/19/21  Yes Ephraim Islas MD   aspirin delayed-release 81 mg tablet Take  by mouth daily. Yes Provider, Historical   multivitamins-minerals-lutein (CENTRUM SILVER) Tab Take 1 Tab by mouth daily. Yes Provider, Historical       REVIEW OF SYSTEMS:     I am not able to complete the review of systems because:    The patient is intubated and sedated    The patient has altered mental status due to his acute medical problems    The patient has baseline aphasia from prior stroke(s)    The patient has baseline dementia and is not reliable historian    The patient is in acute medical distress and unable to provide information           Total of 12 systems reviewed as follows:       POSITIVE= underlined text  Negative = text not underlined  General:  fever, chills, sweats, generalized weakness, weight loss/gain,      loss of appetite   Eyes:    blurred vision, eye pain, loss of vision, double vision  ENT:    rhinorrhea, pharyngitis   Respiratory:   cough, sputum production, SOB, COLLINS, wheezing, pleuritic pain   Cardiology:   chest pain, palpitations, orthopnea, PND, edema, syncope   Gastrointestinal:  abdominal pain , N/V, diarrhea, dysphagia, constipation, bleeding   Genitourinary:  frequency, urgency, dysuria, hematuria, incontinence   Muskuloskeletal :  arthralgia, myalgia, back pain  Hematology:  easy bruising, nose or gum bleeding, lymphadenopathy   Dermatological: rash, ulceration, pruritis, color change / jaundice  Endocrine:   hot flashes or polydipsia   Neurological:  headache, dizziness, confusion, focal weakness, paresthesia,     Speech difficulties, memory loss, gait difficulty  Psychological: Feelings of anxiety, depression, agitation    Objective:   VITALS:    Visit Vitals  BP (!) 166/56   Pulse 65   Temp 98.5 °F (36.9 °C)   Resp 21   Ht 5' 1.5\" (1.562 m)   Wt 59.8 kg (131 lb 13.4 oz)   SpO2 100%   BMI 24.51 kg/m²       PHYSICAL EXAM:    General:    Alert, cooperative, no distress, appears stated age. HEENT: Atraumatic, anicteric sclerae, pink conjunctivae     No oral ulcers, mucosa moist, throat clear, dentition fair  Neck:  Supple, symmetrical,  thyroid: non tender  Lungs:   Clear to auscultation bilaterally. No Wheezing or Rhonchi. No rales. Chest wall:  No tenderness  No Accessory muscle use. Heart:   Regular  rhythm,  No  murmur   No edema  Abdomen:   Soft, non-tender. Not distended. Bowel sounds normal  Extremities: No cyanosis. No clubbing,      Skin turgor normal, Capillary refill normal, Radial dial pulse 2+  Skin:     Not pale. Not Jaundiced  No rashes   Psych:  Good insight. Not depressed. Not anxious or agitated. Neurologic: EOMs intact. No facial asymmetry. No aphasia or slurred speech. Symmetrical strength, Sensation grossly intact. Alert and oriented X 4.     _______________________________________________________________________  Care Plan discussed with:    Comments   Patient x    Family      RN x    Care Manager                    Consultant:      _______________________________________________________________________  Expected  Disposition:   Home with Family    HH/PT/OT/RN    SNF/LTC    JILL    ________________________________________________________________________  TOTAL TIME:  72 Minutes    Critical Care Provided     Minutes non procedure based      Comments    x Reviewed previous records   >50% of visit spent in counseling and coordination of care x Discussion with patient and/or family and questions answered       ________________________________________________________________________  Signed: Shantanu Ingram MD    Procedures: see electronic medical records for all procedures/Xrays and details which were not copied into this note but were reviewed prior to creation of Plan.     LAB DATA REVIEWED:    Recent Results (from the past 24 hour(s))   EKG, 12 LEAD, INITIAL    Collection Time: 11/16/21 10:23 AM   Result Value Ref Range    Ventricular Rate 66 BPM    Atrial Rate 66 BPM    P-R Interval 172 ms    QRS Duration 106 ms    Q-T Interval 406 ms    QTC Calculation (Bezet) 425 ms    Calculated P Axis 74 degrees    Calculated R Axis 3 degrees    Calculated T Axis 39 degrees    Diagnosis       Normal sinus rhythm  Minimal voltage criteria for LVH, may be normal variant  Nonspecific intraventricular conduction delay  When compared with ECG of 17-OCT-2020 10:32,    Nonspecific T wave abnormality, improved in Inferior leads  Confirmed by Ayaan Sutherland ([id number]) on 11/16/2021 11:04:00 AM     CBC WITH AUTOMATED DIFF    Collection Time: 11/16/21 10:24 AM   Result Value Ref Range    WBC 7.1 3.6 - 11.0 K/uL    RBC 2.95 (L) 3.80 - 5.20 M/uL    HGB 7.2 (L) 11.5 - 16.0 g/dL    HCT 24.3 (L) 35.0 - 47.0 %    MCV 82.4 80.0 - 99.0 FL    MCH 24.4 (L) 26.0 - 34.0 PG    MCHC 29.6 (L) 30.0 - 36.5 g/dL    RDW 16.0 (H) 11.5 - 14.5 %    PLATELET 268 881 - 605 K/uL    MPV 9.7 8.9 - 12.9 FL    NRBC 0.0 0  WBC    ABSOLUTE NRBC 0.00 0.00 - 0.01 K/uL    NEUTROPHILS 78 (H) 32 - 75 %    LYMPHOCYTES 14 12 - 49 %    MONOCYTES 7 5 - 13 %    EOSINOPHILS 1 0 - 7 %    BASOPHILS 0 0 - 1 %    IMMATURE GRANULOCYTES 0 0.0 - 0.5 %    ABS. NEUTROPHILS 5.5 1.8 - 8.0 K/UL    ABS. LYMPHOCYTES 1.0 0.8 - 3.5 K/UL    ABS. MONOCYTES 0.5 0.0 - 1.0 K/UL    ABS. EOSINOPHILS 0.1 0.0 - 0.4 K/UL    ABS. BASOPHILS 0.0 0.0 - 0.1 K/UL    ABS. IMM. GRANS. 0.0 0.00 - 0.04 K/UL    DF AUTOMATED     METABOLIC PANEL, COMPREHENSIVE    Collection Time: 11/16/21 10:24 AM   Result Value Ref Range    Sodium 137 136 - 145 mmol/L    Potassium 3.7 3.5 - 5.1 mmol/L    Chloride 102 97 - 108 mmol/L    CO2 27 21 - 32 mmol/L    Anion gap 8 5 - 15 mmol/L    Glucose 253 (H) 65 - 100 mg/dL    BUN 24 (H) 6 - 20 MG/DL    Creatinine 1.01 0.55 - 1.02 MG/DL    BUN/Creatinine ratio 24 (H) 12 - 20      GFR est AA >60 >60 ml/min/1.73m2    GFR est non-AA 52 (L) >60 ml/min/1.73m2    Calcium 8.8 8.5 - 10.1 MG/DL    Bilirubin, total 0.2 0.2 - 1.0 MG/DL    ALT (SGPT) 14 12 - 78 U/L    AST (SGOT) 10 (L) 15 - 37 U/L    Alk.  phosphatase 84 45 - 117 U/L    Protein, total 6.7 6.4 - 8.2 g/dL    Albumin 3.1 (L) 3.5 - 5.0 g/dL    Globulin 3.6 2.0 - 4.0 g/dL    A-G Ratio 0.9 (L) 1.1 - 2.2     OCCULT BLOOD, STOOL    Collection Time: 11/16/21 10:34 AM   Result Value Ref Range    Occult blood, stool Positive (A) NEG

## 2021-11-16 NOTE — ED NOTES
Assumed care of pt from triage, pt daughter at bedside, pt placed on monitor x 3 and EKG performed at bedside. Pt has hx of dementia so daughter providing hx. Pt has been experiencing fatigue and SOB worse w/ exertion x 6  Months. Pt was seen by PCP yesterday and daughter called this am d/t low hgb and referred to ED for transfusion. Pt reporting her stools are \"all kinds of colors\", hemoccult performed by MD. Pt denies any dysuria, hematuria, abdominal pain, CP or other sx at this time     1210 Patient is being transferred to [address], Room # [phone]. [facility] Eldridge [facility] routine progression of care. Report consisted of the following information SBAR, Kardex, ED Summary, MAR and Recent Results. Patient transferred to receiving unit by: transport (RN or tech name). Outstanding consults needed: No     Next labs due: The following personal items will be sent with the patient during transfer to the floor:     All valuables:    Cardiac monitoring ordered: Yes    The following CURRENT information was reported to the receiving RN:    Code status: DNR at time of transfer    Last set of vital signs:  Vital Signs  Level of Consciousness: Alert (0) (11/16/21 1007)  Temp: 97.8 °F (36.6 °C) (11/16/21 1007)  Temp Source: Oral (11/16/21 1007)  Pulse (Heart Rate): 68 (11/16/21 1144)  Resp Rate: 21 (11/16/21 1144)  BP: (!) 151/41 (11/16/21 1144)  MAP (Monitor): 70 (11/16/21 1144)  MAP (Calculated): 78 (11/16/21 1144)  MEWS Score: 1 (11/16/21 1007)         Oxygen Therapy  O2 Sat (%): 99 % (11/16/21 1144)  Pulse via Oximetry: 65 beats per minute (11/16/21 1144)  O2 Device: None (Room air) (11/16/21 1007)      Last pain assessment:  Pain 1  Pain Scale 1: Numeric (0 - 10)  Pain Intensity 1: 0      Wounds: No     Urinary catheter: voiding  Is there a cardenas order: No     LDAs:       Peripheral IV 11/16/21 Left Antecubital (Active)   Site Assessment Clean, dry, & intact 11/16/21 2398 Phlebitis Assessment 0 11/16/21 1033   Infiltration Assessment 0 11/16/21 1033   Dressing Status Clean, dry, & intact 11/16/21 1033   Dressing Type Transparent; Tape 11/16/21 1033   Hub Color/Line Status Green; Flushed 11/16/21 1033   Action Taken Blood drawn 11/16/21 1033   Alcohol Cap Used No 11/16/21 1033         Opportunity for questions and clarification was provided.     Kamiin Agustin RN

## 2021-11-16 NOTE — PROGRESS NOTES
Pharmacy Medication Reconciliation     The patient was interviewed regarding current PTA medication list, use and drug allergies;  patient present in room and obtained permission from patient to discuss drug regimen with visitor(s) present. The patient was questioned regarding use of any other inhalers, topical products, over the counter medications, herbal medications, vitamin products or ophthalmic/nasal/otic medication use. Allergy Update: Patient has no known allergies. Recommendations/Findings: The following amendments were made to the patient's active medication list on file at [facility]:   1) Additions: none  2) Deletions:   -diclofenac gel  -rivastigmine patch  3) Changes: none  Pertinent Findings: none    Clarified PTA med list with rx query, patient's daughter interview. PTA medication list was corrected to the following:     Prior to Admission Medications   Prescriptions Last Dose Informant Taking?   aspirin delayed-release 81 mg tablet 11/16/2021 at 0900 Child Yes   Sig: Take  by mouth daily. multivitamins-minerals-lutein (CENTRUM SILVER) Tab 11/16/2021 at 0900 Child Yes   Sig: Take 1 Tab by mouth daily.      pravastatin (PRAVACHOL) 40 mg tablet 11/15/2021 at 2100 Child Yes   Sig: TAKE 1 TABLET BY MOUTH EVERY NIGHT   valsartan-hydroCHLOROthiazide (DIOVAN-HCT) 160-12.5 mg per tablet 11/16/2021 at 0900 Child Yes   Sig: TAKE 1/2 TABLET BY MOUTH DAILY      Facility-Administered Medications: None        Thank you,  Avie Councilman, [facility]

## 2021-11-16 NOTE — PROGRESS NOTES
Transition of Care Plan:    RUR: 13 %, LOW  Disposition: Home with follow up appointments  Follow up appointments: PCP  DME needed: None  Transportation at Discharge: Patient daughter   Keena Sun or means to access home:  Yes      IM Medicare Letter: 2nd IM letter  Is patient a BCPI-A Bundle:    Bundle letter will be distributed by unit CM if pt meets bundle criteria. If yes, was Bundle Letter given?:     Caregiver Contact: Colt Olmedo [phone]  Discharge Caregiver contacted prior to discharge? Unit CM to follow up before discharge    Reason for Admission:  GI Bleed                     RUR Score:          13%           Plan for utilizing home health:  Not at this time         PCP: First and Last name:  Christy Cavazos MD     Name of Practice: [facility]  Are you a current patient: Yes/No: Yes  Approximate date of last visit: 11/15/21   Can you participate in a virtual visit with your PCP:  No                    Current Advanced Directive/Advance Care Plan: DNR  Advance Care Planning     General Advance Care Planning (ACP) Conversation      Date of Conversation: 11/16/2021  Conducted with: Patient with Decision Making Capacity    Healthcare Decision Maker: Daughter Gale Bernardo Next of Kin   No healthcare decision makers have been documented. Click here to complete [address] including selection of the Healthcare Decision Maker Relationship (ie \"Primary\")    Today we documented Decision Maker(s) consistent with Legal Next of Kin hierarchy. Content/Action Overview:    Has ACP document(s) NOT on file - requested patient to provide  Reviewed DNR/DNI and patient elects DNR order - completed portable DNR form & placed order    Length of Voluntary ACP Conversation in minutes:  <16 minutes (Non-Billable)    Lisette Mora RN                         Transition of Care Plan:                    CM met with patient and her daughter Gloria Felder at the bedside to discuss discharge planning. Patient name, , and demographics all verified in chart. Patient lives alone In a one level home. Patient and daughter report patient is independent of her ADLS. Patient's daughter provides transport. Patient preferred [facility] and [address] [place] [address]. Patient has no SNF, HH, or IPR history. Patient is hard of hearing and wears bilateral hearing aids. Patient is active with her PCP every 3 months. Last visit was yesterday. Care Management Interventions  PCP Verified by CM: Yes  Last Visit to PCP: 11/15/21  Mode of Transport at Discharge: Other (see comment) (Patient daughter to transport )  Transition of Care Consult (CM Consult): Discharge Planning  Discharge Durable Medical Equipment: No  Physical Therapy Consult: No  Occupational Therapy Consult: No  Speech Therapy Consult: No  Support Systems: Child(tony)  Confirm Follow Up Transport: Family  Discharge Location  Discharge Placement: Home    Unit CM to continue to follow for discharge needs and planning.     Frankie Roque, BSN, RN, BSW   RN Care Manager

## 2021-11-16 NOTE — CONSULTS
[address]               Gastroenterology Consult Note  VENUS Vaughn   for Dr. Abe Paige     Admitting: Dr. Raghu Ramirez Date: 11/16/2021     Subjective:     Chief Complaint: SOB    History of Present Illness: Yates Lesch is a 80 y.o. female who is seen in consultation for GI bleed. She had worsening SOB and fatigue ongoing for some time but progressively worsening. Taking more naps. Needing breaks when working around the house. No N/V however appetite was not great. Early fullness per daughter. Reports weight loss fo about 10-12 lb. No dysphagia or refulx type sx. She reprots normal BM without melena or hematochezia. Not taking any AC meds, only taking 81 mg of ASA. No other NSAID use. No previous hx of transfusion or anemia in the past.  She does not recall an EGD or colonoscopy in the past.  She does reprot a colon resction years ago for perforated diverticulitis (>10 yrs) and had a colostomy initially that was able to be reversed. No recurrent issues with diverticulitis since then. Hgb found to be low in the ER:   Results for Chino Soto (MRN [id number]) as of 11/16/2021 15:46   Ref. Range 10/17/2020 10:42 5/28/2021 00:00 11/5/2021 00:00 11/15/2021 00:00 11/16/2021 10:24   HGB Latest Ref Range: 11.5 - 16.0 g/dL 11.3 (L) 10.0 (L) 8.2 (L) 7.5 (L) 7.2 (L)   HCT Latest Ref Range: 35.0 - 47.0 % 33.5 (L) 32.3 (L) 26.1 (L) 25.9 (L) 24.3 (L)     Ferritin low at 11    BUN 24, Cr stable    Stool heme positive     No tobacco or EtOH use. Reports cardiac stent placed years ago, no other significant heart of lung issues since.      Past Medical History:   Diagnosis Date    AK (actinic keratosis) 7/27/2017    Arteriosclerotic coronary artery disease 7/27/2017    Arthritis of right knee 7/27/2017    Back pain 7/27/2017    Carotid atherosclerosis 7/27/2017    Cough 7/27/2017    Fatigue 7/27/2017    History of pneumonia 7/27/2017    HTN (hypertension) 7/27/2017    Hyperlipidemia 7/27/2017    Hypertension     Late onset Alzheimer's dementia without behavioral disturbance ([facility] [address] 8/30/2021    Memory loss 2/19/2021    Need for Streptococcus pneumoniae vaccination 7/27/2017    On statin therapy 7/27/2017    Plantar fasciitis 7/27/2017    Pneumonia     Shoulder sprain 7/27/2017    Syncope 7/27/2017     Past Surgical History:   Procedure Laterality Date    HX GI      ruptured intestine      Family History   Problem Relation Age of Onset    Hypertension Mother     Heart Disease Father      Social History     Tobacco Use    Smoking status: Never Smoker    Smokeless tobacco: Never Used   Substance Use Topics    Alcohol use: No      No Known Allergies  Current Facility-Administered Medications   Medication Dose Route Frequency    acetaminophen (TYLENOL) tablet 650 mg  650 mg Oral Q6H PRN    0.9% sodium chloride infusion 250 mL  250 mL IntraVENous PRN    pravastatin (PRAVACHOL) tablet 40 mg  40 mg Oral QHS    sodium chloride (NS) flush 5-40 mL  5-40 mL IntraVENous Q8H    sodium chloride (NS) flush 5-40 mL  5-40 mL IntraVENous PRN    ondansetron (ZOFRAN) injection 4 mg  4 mg IntraVENous Q6H PRN    pantoprazole (PROTONIX) 40 mg in 0.9% sodium chloride 10 mL injection  40 mg IntraVENous Q12H    iron sucrose (VENOFER) injection 200 mg  200 mg IntraVENous Q24H    [START ON 11/17/2021] losartan (COZAAR) tablet 100 mg  100 mg Oral DAILY    And    [START ON 11/17/2021] hydroCHLOROthiazide (HYDRODIURIL) tablet 12.5 mg  12.5 mg Oral DAILY        Review of Systems:   A detailed review of systems was performed as follows:  Constitutional: +weight loss  Eyes:  No ocular sensitivity to the sun, blurred vision or double vision. ENMT:  No nose or sinus problems.   Respiratory: No coughing, + sob  Cardiac:  No chest pain, exertional chest pain or palpitations  Gastrointestinal:  See history of the present illness  :   No pain with urination or hematuria  Musculoskeletal:  No arthritis or hot swollen joints. Endocrine:  No thyroid disease or diabetes  Psychiatric: No depression or feeling blue  Integumentary:  No skin rash or sensitivity to the sun. Neurologic:  No stroke or seizure; no numbness or tingling of the extremities. Heme-Lymphatic:  No history of anemia, no unexplained lumps or bumps      Objective:     Physical Exam:  Visit Vitals  BP (!) 145/55   Pulse 69   Temp 98.7 °F (37.1 °C)   Resp 20   Ht 5' 1.5\" (1.562 m)   Wt 59.8 kg (131 lb 13.4 oz)   SpO2 97%   BMI 24.51 kg/m²        GEN: Pleasant, elderly WF, NAD  Skin:  Extremities and face reveal no rashes  HEENT: Sclerae anicteric. Extra-occular muscles are intact. No oral ulcers. No abnormal pigmentation of the lips. The neck is supple. Cardiovascular: Regular rate and rhythm. No murmurs, gallops, or rubs. Respiratory:  Comfortable breathing with no accessory muscle use. Clear breath sounds with no wheezes, rales, or rhonchi. GI:  Abdomen nondistended, soft, and nontender. Midline surgical scar, well healed,   Normal active bowel sounds. No enlargement of the liver or spleen. Rectal:  Deferred  Musculoskeletal:  No pitting edema of the lower legs. Extremities have good range of motion. No costovertebral tenderness. Neurological:  Gross memory appears intact. Patient is alert and oriented. Psychiatric:  Mood appears appropriate with judgement intact. Lymphatic:  No cervical or supraclavicular adenopathy.     Laboratory:    Recent Results (from the past 24 hour(s))   EKG, 12 LEAD, INITIAL    Collection Time: 11/16/21 10:23 AM   Result Value Ref Range    Ventricular Rate 66 BPM    Atrial Rate 66 BPM    P-R Interval 172 ms    QRS Duration 106 ms    Q-T Interval 406 ms    QTC Calculation (Bezet) 425 ms    Calculated P Axis 74 degrees    Calculated R Axis 3 degrees    Calculated T Axis 39 degrees    Diagnosis       Normal sinus rhythm  Minimal voltage criteria for LVH, may be normal variant  Nonspecific intraventricular conduction delay  When compared with ECG of 17-OCT-2020 10:32,    Nonspecific T wave abnormality, improved in Inferior leads  Confirmed by Chris Solomon ([id number]) on 11/16/2021 11:04:00 AM     CBC WITH AUTOMATED DIFF    Collection Time: 11/16/21 10:24 AM   Result Value Ref Range    WBC 7.1 3.6 - 11.0 K/uL    RBC 2.95 (L) 3.80 - 5.20 M/uL    HGB 7.2 (L) 11.5 - 16.0 g/dL    HCT 24.3 (L) 35.0 - 47.0 %    MCV 82.4 80.0 - 99.0 FL    MCH 24.4 (L) 26.0 - 34.0 PG    MCHC 29.6 (L) 30.0 - 36.5 g/dL    RDW 16.0 (H) 11.5 - 14.5 %    PLATELET 532 753 - 576 K/uL    MPV 9.7 8.9 - 12.9 FL    NRBC 0.0 0  WBC    ABSOLUTE NRBC 0.00 0.00 - 0.01 K/uL    NEUTROPHILS 78 (H) 32 - 75 %    LYMPHOCYTES 14 12 - 49 %    MONOCYTES 7 5 - 13 %    EOSINOPHILS 1 0 - 7 %    BASOPHILS 0 0 - 1 %    IMMATURE GRANULOCYTES 0 0.0 - 0.5 %    ABS. NEUTROPHILS 5.5 1.8 - 8.0 K/UL    ABS. LYMPHOCYTES 1.0 0.8 - 3.5 K/UL    ABS. MONOCYTES 0.5 0.0 - 1.0 K/UL    ABS. EOSINOPHILS 0.1 0.0 - 0.4 K/UL    ABS. BASOPHILS 0.0 0.0 - 0.1 K/UL    ABS. IMM. GRANS. 0.0 0.00 - 0.04 K/UL    DF AUTOMATED     METABOLIC PANEL, COMPREHENSIVE    Collection Time: 11/16/21 10:24 AM   Result Value Ref Range    Sodium 137 136 - 145 mmol/L    Potassium 3.7 3.5 - 5.1 mmol/L    Chloride 102 97 - 108 mmol/L    CO2 27 21 - 32 mmol/L    Anion gap 8 5 - 15 mmol/L    Glucose 253 (H) 65 - 100 mg/dL    BUN 24 (H) 6 - 20 MG/DL    Creatinine 1.01 0.55 - 1.02 MG/DL    BUN/Creatinine ratio 24 (H) 12 - 20      GFR est AA >60 >60 ml/min/1.73m2    GFR est non-AA 52 (L) >60 ml/min/1.73m2    Calcium 8.8 8.5 - 10.1 MG/DL    Bilirubin, total 0.2 0.2 - 1.0 MG/DL    ALT (SGPT) 14 12 - 78 U/L    AST (SGOT) 10 (L) 15 - 37 U/L    Alk.  phosphatase 84 45 - 117 U/L    Protein, total 6.7 6.4 - 8.2 g/dL    Albumin 3.1 (L) 3.5 - 5.0 g/dL    Globulin 3.6 2.0 - 4.0 g/dL    A-G Ratio 0.9 (L) 1.1 - 2.2     OCCULT BLOOD, STOOL    Collection Time: 11/16/21 10:34 AM   Result Value Ref Range    Occult blood, stool Positive (A) NEG     RBC, ALLOCATE    Collection Time: 11/16/21 11:30 AM   Result Value Ref Range    HISTORY CHECKED? Historical check performed    TYPE & SCREEN    Collection Time: 11/16/21 11:54 AM   Result Value Ref Range    Crossmatch Expiration 11/19/2021,1510     ABO/Rh(D) A POSITIVE     Antibody screen NEG     Unit number [id number]     Blood component type RC LR     Unit division 00     Status of unit ISSUED     Crossmatch result Compatible          Assessment/Plan:     Active Problems:    Acute blood loss anemia (11/16/2021)      GI bleed (11/16/2021)     Patient with anemia and heme positive stools. No recent GI eval.  Hx of perforated diverticulitis many years ago with partial colon resection. Proceed with EGD tomorrow to look for possible cause to UGI bleed. Agree with IV PPI, NPO after midnight. If non revealing consider prepping tomorrow for colonoscopy Thursday. Procedures reviewed with patient and daughter in great detail including risks, benefits and alternatives and they are in agreement with proceeding. Continue to monitor hgb and transfuse as needed. The patient was counseled at length about the risks of jennifer Covid-19 during their perioperative period and any recovery window from their procedure. The patient was made aware that jennifer Covid-19  may worsen their prognosis for recovering from their procedure and lend to a higher morbidity and/or mortality risk. All material risks, benefits, and reasonable alternatives including postponing the procedure were discussed. The patient does  wish to proceed with the procedure at this time.     VENUS Siddiqui    11/16/21  3:45 PM    [address] [facility], [address] [place] [address]. [phone]  [address] [place] [address] [place]: [phone]

## 2021-11-16 NOTE — PROGRESS NOTES
Called to get report, but the ED nurse was unavailable. I gave the number to my zone phone so that the ED nurse could call back.

## 2021-11-16 NOTE — ACP (ADVANCE CARE PLANNING)
Advance Care Planning Note      NAME: Mariana Barrientos   :  1932   MRN:  [id number]     Date/Time:  2021 1:34 PM    Active Diagnoses:  Hospital Problems  Date Reviewed: 2021          Codes Class Noted POA    Acute blood loss anemia ICD-10-CM: D62  ICD-9-CM: 285.1  2021 Unknown        GI bleed ICD-10-CM: K92.2  ICD-9-CM: 578.9  2021 Unknown              These active diagnoses are of sufficient risk that focused discussion on advance care planning is indicated in order to allow the patient to thoughtfully consider personal goals of care, and if situations arise that prevent the ability to personally give input, to ensure appropriate representation of their personal desires for different levels and aggressiveness of care. Discussion:   Code status addressed and wants to be a DNR . Patient wants central line and vasopressors if needed. Patient would not want a feeding tube, if needed, for nutritional support. Patient  would like to assign her  daughter  Milton Regan [phone]       as the surrogate decision maker. Persons present and participating in discussion: Beni Perea MD, daughter Law cindy       Time Spent:   Total time spent face-to-face in education and discussion:   16minutes.          Addison Garcia MD   Hospitalist

## 2021-11-17 NOTE — PROGRESS NOTES
[id number] [facility] GI Prgoress Note  VENUS Henry   for Dr. Sylvie Claude    Patient seen during morning rounds, daughter at bedside. She has been NPO. Hgb improved after transfusion yesterday to 8.8. Rapid COVID negative. Both patient and daughter are in agreement with proceeding with EGD as planned later today.      Conception Risen VENUS Perrin  11/17/21  09:30

## 2021-11-17 NOTE — PROCEDURES
Esophagogastroduodenoscopy    Indications:  Iron deficiency anemia    Medications:  See anesthesia form    Assistants:  nura Dennis RN      Post procedure diagnosis:  gastric erosions     Description of Procedure:    Prior to the procedure its objectives, risks, consequences and alternatives were discussed with the patient who then elected to proceed. The Olympus video endoscope was inserted under direct vision into the mouth and then into the esophagus. The esophagus mucosa looked normal.    The z-line was located at 36 cm. A four cm hiatal hernia was seen with the diaphragm pinch at 40 cm. There were no diagnostic abnormalities of the body, fundus, cardia and incisura of the stomach. This included direct and retroflexion examination. In the mid and distal antrum there were four healing linear ulcers. The first and second portion of the duodenum appeared normal.  I took stomach biopsies for hp rapid. I took biopsies of the duodenum, stomach and stomach ulcers. Complications: There were no apparent complications and the patient tolerated the procedure well.         Implants:  none    Estimated Blood Loss:  About 20 ml at biopsy sites  Specimens Removed:  Stomach hp rapid  Duodenum  stoamch  Impressions:  Hiatal hernia  Healing gastric ulcers      Signed By: Wendie Cotter MD                        November 17, 2021     2:37 PM

## 2021-11-17 NOTE — PROGRESS NOTES
Problem: Upper and Lower GI Bleed: Day 2  Goal: Activity/Safety  Outcome: Progressing Towards Goal  Goal: Diagnostic Test/Procedures  Outcome: Progressing Towards Goal  Goal: Nutrition/Diet  Outcome: Progressing Towards Goal     Problem: Patient Education: Go to Patient Education Activity  Goal: Patient/Family Education  Outcome: Progressing Towards Goal     Problem: Pressure Injury - Risk of  Goal: *Prevention of pressure injury  Description: Document Homero Scale and appropriate interventions in the flowsheet.   Outcome: Progressing Towards Goal  Note: Pressure Injury Interventions:  Sensory Interventions: Assess changes in LOC, Assess need for specialty bed    Moisture Interventions: Absorbent underpads, Apply protective barrier, creams and emollients    Activity Interventions: Assess need for specialty bed, Increase time out of bed, Pressure redistribution bed/mattress(bed type), PT/OT evaluation    Mobility Interventions: Assess need for specialty bed, HOB 30 degrees or less, Pressure redistribution bed/mattress (bed type), PT/OT evaluation    Nutrition Interventions: Document food/fluid/supplement intake    Friction and Shear Interventions: Apply protective barrier, creams and emollients, HOB 30 degrees or less, Minimize layers

## 2021-11-17 NOTE — PROGRESS NOTES
56  Endoscope was pre-cleaned at bedside immediately following procedure by Rohini Jessica. 0564  Received report from anesthesia. Assumed pt care. VSS.    1451  TRANSFER - OUT REPORT:    Verbal report given to Paulina(name) on [facility]  being transferred to [facility](unit) for routine progression of care       Report consisted of patients Situation, Background, Assessment and   Recommendations(SBAR). Information from the following report(s) SBAR, Procedure Summary and Recent Results was reviewed with the receiving nurse. Lines:   Peripheral IV 11/16/21 Left Antecubital (Active)   Site Assessment Clean, dry, & intact 11/17/21 1100   Phlebitis Assessment 0 11/17/21 1100   Infiltration Assessment 0 11/17/21 1100   Dressing Status Clean, dry, & intact 11/17/21 1100   Dressing Type Tape; Transparent 11/17/21 1100   Hub Color/Line Status Green; Flushed; Patent 11/17/21 1100   Action Taken Open ports on tubing capped 11/17/21 1100   Alcohol Cap Used Yes 11/17/21 1100       Peripheral IV 11/17/21 Forearm (Active)   Site Assessment Clean, dry, & intact 11/17/21 1338   Phlebitis Assessment 0 11/17/21 1338        Opportunity for questions and clarification was provided.

## 2021-11-17 NOTE — PROGRESS NOTES
See op note  Trudi Herman to eat   I will speak to daughter in room today    Earlene Goldberg MD  2:40 PM  11/17/2021

## 2021-11-17 NOTE — ANESTHESIA PREPROCEDURE EVALUATION
Relevant Problems   RESPIRATORY SYSTEM   (+) Pneumonia- lobar   (+) SOB (shortness of breath)      NEUROLOGY   (+) Late onset Alzheimer's dementia without behavioral disturbance (HCC)      CARDIOVASCULAR   (+) Arteriosclerotic coronary artery disease   (+) HTN (hypertension)      ENDOCRINE   (+) Arthritis of right knee      HEMATOLOGY   (+) Acute blood loss anemia       Anesthetic History   No history of anesthetic complications            Review of Systems / Medical History  Patient summary reviewed, nursing notes reviewed and pertinent labs reviewed    Pulmonary  Within defined limits                 Neuro/Psych   Within defined limits           Cardiovascular    Hypertension          CAD and PAD    Exercise tolerance: <4 METS     GI/Hepatic/Renal  Within defined limits              Endo/Other        Arthritis     Other Findings   Comments: Memory loss (R41.3) 2/19/2021   Late onset Alzheimer's dementia without behavioral disturbance               Physical Exam    Airway  Mallampati: II  TM Distance: 4 - 6 cm  Neck ROM: normal range of motion   Mouth opening: Normal     Cardiovascular  Regular rate and rhythm,  S1 and S2 normal,  no murmur, click, rub, or gallop             Dental  No notable dental hx       Pulmonary  Breath sounds clear to auscultation               Abdominal  GI exam deferred       Other Findings            Anesthetic Plan    ASA: 3  Anesthesia type: MAC            Anesthetic plan and risks discussed with: Patient

## 2021-11-17 NOTE — PROGRESS NOTES
Hospitalist Progress Note    NAME: Marcy Avalos   :  1932   MRN:  [id number]       Assessment / Plan:    Symptomatic acute blood loss anemia  Iron deficiency anemia  GI bleed  Alzheimer disease  Hypertension  -Hemoglobin 8.8, stable, will keep a close watch, transfuse if hemoglobin less than 7.  -Continue IV iron. Has ferritin is low.  -Protonix twice daily. -GI on board-plan to do EGD today. If no findings on the EGD then may likely do colonoscopy. -Resume BP meds    18.5 - 24.9 Normal weight / Body mass index is 24.51 kg/m². Estimated discharge date:   Barriers:    Code status: Full  Prophylaxis: SCD's  Recommended Disposition: Home w/Family     Subjective:     Chief Complaint / Reason for Physician Visit  Patient seen today, doing fine, no complaints, n.p.o. since morning for EGD. Discussed with RN events overnight. Objective:     VITALS:   Last 24hrs VS reviewed since prior progress note.  Most recent are:  Patient Vitals for the past 24 hrs:   Temp Pulse Resp BP SpO2   21 1027 98.6 °F (37 °C) 66 18 (!) 140/61 94 %   21 0914 -- 67 -- (!) 157/50 --   21 0721 98.3 °F (36.8 °C) 65 18 113/66 95 %   21 0400 -- (!) 58 -- -- --   21 0343 98.8 °F (37.1 °C) 63 18 (!) 129/38 96 %   21 2330 98.3 °F (36.8 °C) 60 16 101/79 94 %   21 -- -- -- (!) 155/46 --   21 97.8 °F (36.6 °C) 76 16 (!) 184/57 97 %   21 1755 -- 69 24 (!) 153/49 96 %   21 1725 -- 69 22 (!) 150/57 98 %   21 1718 -- 69 20 (!) 150/57 97 %   21 1655 -- 68 19 (!) 168/50 99 %   21 1640 -- 75 20 (!) 164/54 99 %   21 1625 -- 67 20 (!) 150/50 98 %   21 1610 -- 66 18 (!) 146/56 --   21 1555 -- 67 21 (!) 147/41 98 %   21 1544 98.6 °F (37 °C) 67 23 (!) 139/48 97 %   21 1520 98.7 °F (37.1 °C) 69 20 (!) 145/55 97 %       Intake/Output Summary (Last 24 hours) at 2021 1301  Last data filed at 2021 1100  Gross per 24 hour   Intake 433.8 ml   Output --   Net 433.8 ml        I had a face to face encounter and independently examined this patient on 11/17/2021, as outlined below:  PHYSICAL EXAM:  General: WD, WN. Alert, cooperative, no acute distress    EENT:  EOMI. Anicteric sclerae. MMM  Resp:  CTA bilaterally, no wheezing or rales. No accessory muscle use  CV:  Regular  rhythm,  No edema  GI:  Soft, Non distended, Non tender. +Bowel sounds  Neurologic:  Alert and oriented X 3, normal speech,   Psych:   Good insight. Not anxious nor agitated  Skin:  No rashes. No jaundice    Reviewed most current lab test results and cultures  YES  Reviewed most current radiology test results   YES  Review and summation of old records today    NO  Reviewed patient's current orders and MAR    YES  PMH/ reviewed - no change compared to H&P  ________________________________________________________________________  Care Plan discussed with:    Comments   Patient x    Family  x daughter   RN x    Care Manager     Consultant                       x Multidiciplinary team rounds were held today with , nursing, pharmacist and clinical coordinator. Patient's plan of care was discussed; medications were reviewed and discharge planning was addressed. ________________________________________________________________________  Total NON critical care TIME:  35   Minutes    Total CRITICAL CARE TIME Spent:   Minutes non procedure based      Comments   >50% of visit spent in counseling and coordination of care     ________________________________________________________________________  Madhu Lee MD     Procedures: see electronic medical records for all procedures/Xrays and details which were not copied into this note but were reviewed prior to creation of Plan. LABS:  I reviewed today's most current labs and imaging studies.   Pertinent labs include:  Recent Labs     11/17/21  0544 11/16/21  2125 11/16/21  1024 11/15/21  0000 11/15/21  0000   WBC 6.5  --  7.1  --  7.4   HGB 8.8* 8.6* 7.2*   < > 7.5*   HCT 28.5* 27.8* 24.3*   < > 25.9*     --  309  --  329    < > = values in this interval not displayed.      Recent Labs     11/17/21  0544 11/16/21  1024    137   K 3.9 3.7    102   CO2 24 27   * 253*   BUN 19 24*   CREA 0.83 1.01   CA 8.9 8.8   ALB  --  3.1*   TBILI  --  0.2   ALT  --  14       Signed: Verito Maradiaga MD

## 2021-11-17 NOTE — PROGRESS NOTES
End of Shift Note    Bedside shift change report given to RN(oncoming nurse) by Jessy Clark RN (offgoing nurse). Report included the following information SBAR, Kardex, ED Summary, Intake/Output, MAR, Recent Results, Med Rec Status and Cardiac Rhythm Sinus Rhythm    Shift worked:  7p-7a     Shift summary and any significant changes:    Hmg now 8.8, NPO at midnight for EGD   Concerns for physician to address:       Zone phone for oncoming shift:          Activity:  Activity Level: Up with Assistance  Number times ambulated in hallways past shift: 0  Number of times OOB to chair past shift: 0    Cardiac:   Cardiac Monitoring: Yes      Cardiac Rhythm: Sinus Tim    Access:   Current line(s): PIV     Genitourinary:   Urinary status: voiding    Respiratory:   O2 Device: None (Room air)  Chronic home O2 use?: NO  Incentive spirometer at bedside: NO     GI:     Current diet:  ADULT ORAL NUTRITION SUPPLEMENT Breakfast, Lunch, Dinner; Standard High Calorie/High Protein  DIET NPO  Passing flatus: YES  Tolerating current diet: YES       Pain Management:   Patient states pain is manageable on current regimen: YES    Skin:  Homero Score: 17  Interventions: float heels, increase time out of bed and limit briefs    Patient Safety:  Fall Score:  Total Score: 4  Interventions: bed/chair alarm, gripper socks, pt to call before getting OOB and stay with me (per policy)  High Fall Risk: Yes    Length of Stay:  Expected LOS: - - -  Actual LOS: [address], RN

## 2021-11-17 NOTE — ANESTHESIA POSTPROCEDURE EVALUATION
Procedure(s):  ESOPHAGOGASTRODUODENOSCOPY (EGD)  ESOPHAGOGASTRODUODENAL (EGD) BIOPSY. MAC    Anesthesia Post Evaluation      Multimodal analgesia: multimodal analgesia used between 6 hours prior to anesthesia start to PACU discharge  Patient location during evaluation: PACU  Patient participation: complete - patient participated  Level of consciousness: sleepy but conscious and responsive to verbal stimuli  Pain score: 1  Pain management: adequate  Airway patency: patent  Anesthetic complications: no  Cardiovascular status: acceptable  Respiratory status: acceptable  Hydration status: acceptable  Comments: +Post-Anesthesia Evaluation and Assessment    Patient: Rosaline Osler MRN: [id number]  SSN: [id number]   YOB: 1932  Age: 80 y.o. Sex: female      Cardiovascular Function/Vital Signs    BP (!) 168/41   Pulse 74   Temp 37 °C (98.6 °F)   Resp 25   Ht 5' 1.5\" (1.562 m)   Wt 59.8 kg (131 lb 13.4 oz)   SpO2 97%   BMI 24.51 kg/m²     Patient is status post Procedure(s):  ESOPHAGOGASTRODUODENOSCOPY (EGD)  ESOPHAGOGASTRODUODENAL (EGD) BIOPSY. Nausea/Vomiting: Controlled. Postoperative hydration reviewed and adequate. Pain:  Pain Scale 1: Numeric (0 - 10) (11/17/21 1331)  Pain Intensity 1: 0 (11/17/21 1331)   Managed. Neurological Status: At baseline. Mental Status and Level of Consciousness: Arousable. Pulmonary Status:   O2 Device: Nasal cannula (11/17/21 1445)   Adequate oxygenation and airway patent. Complications related to anesthesia: None    Post-anesthesia assessment completed. No concerns. Signed By: Beth Ward MD    11/17/2021  Post anesthesia nausea and vomiting:  controlled  Final Post Anesthesia Temperature Assessment:  Normothermia (36.0-37.5 degrees C)      INITIAL Post-op Vital signs: No vitals data found for the desired time range.

## 2021-11-17 NOTE — H&P
Pre-endoscopy H and P    The patient was seen and examined in the endoscopy suite. The airway was assessed and docuemented. The problem list, past medical history, and medications were reviewed.      Patient Active Problem List   Diagnosis Code    Pneumonia- lobar J18.9    UTI (lower urinary tract infection) N39.0    Hyponatremia E87.1    Hyperglycemia R73.9    ASD (atrial septal defect) VS PFO on echo Q21.1    Pulmonary hypertension, moderate to severe- on echo I27.20    SOB (shortness of breath) R06.02    AK (actinic keratosis) L57.0    Arthritis of right knee M17.11    On statin therapy Z79.899    Arteriosclerotic coronary artery disease I25.10    Hyperlipidemia E78.5    Carotid atherosclerosis I65.29    HTN (hypertension) I10    History of pneumonia Z87.01    Cough R05.9    Back pain M54.9    Shoulder sprain S43.409A    Plantar fasciitis M72.2    Fatigue R53.83    Syncope R55    Memory loss R41.3    Late onset Alzheimer's dementia without behavioral disturbance (HCC) G30.1, F02.80    Acute blood loss anemia D62    GI bleed K92.2     Social History     Socioeconomic History    Marital status: SINGLE     Spouse name: Not on file    Number of children: Not on file    Years of education: Not on file    Highest education level: Not on file   Occupational History    Not on file   Tobacco Use    Smoking status: Never Smoker    Smokeless tobacco: Never Used   Vaping Use    Vaping Use: Never used   Substance and Sexual Activity    Alcohol use: No    Drug use: No    Sexual activity: Never   Other Topics Concern    Not on file   Social History Narrative    Not on file     Social Determinants of Health     Financial Resource Strain:     Difficulty of Paying Living Expenses: Not on file   Food Insecurity:     Worried About Running Out of Food in the Last Year: Not on file    [place] of Food in the Last Year: Not on file   Transportation Needs:     Lack of Transportation (Medical): Not on file    Lack of Transportation (Non-Medical): Not on file   Physical Activity:     Days of Exercise per Week: Not on file    Minutes of Exercise per Session: Not on file   Stress:     Feeling of Stress : Not on file   Social Connections:     Frequency of Communication with Friends and Family: Not on file    Frequency of Social Gatherings with Friends and Family: Not on file    Attends Quaker Services: Not on file    Active Member of [address] [place] [address] or Organizations: Not on file    Attends Club or Organization Meetings: Not on file    Marital Status: Not on file   Intimate Partner Violence:     Fear of Current or Ex-Partner: Not on file    Emotionally Abused: Not on file    Physically Abused: Not on file    Sexually Abused: Not on file   Housing Stability:     Unable to Pay for Housing in the Last Year: Not on file    Number of [place] in the Last Year: Not on file    Unstable Housing in the Last Year: Not on file     Past Medical History:   Diagnosis Date    AK (actinic keratosis) 7/27/2017    Arteriosclerotic coronary artery disease 7/27/2017    Arthritis of right knee 7/27/2017    Back pain 7/27/2017    Carotid atherosclerosis 7/27/2017    Cough 7/27/2017    Fatigue 7/27/2017    History of pneumonia 7/27/2017    HTN (hypertension) 7/27/2017    Hyperlipidemia 7/27/2017    Hypertension     Late onset Alzheimer's dementia without behavioral disturbance ([facility] [address] 8/30/2021    Memory loss 2/19/2021    Need for Streptococcus pneumoniae vaccination 7/27/2017    On statin therapy 7/27/2017    Plantar fasciitis 7/27/2017    Pneumonia     Shoulder sprain 7/27/2017    Syncope 7/27/2017     The patient has a family history of na    Prior to Admission Medications   Prescriptions Last Dose Informant Patient Reported? Taking?   aspirin delayed-release 81 mg tablet 11/16/2021 at 0900 Child Yes Yes   Sig: Take  by mouth daily.    multivitamins-minerals-lutein (CENTRUM SILVER) Tab 11/16/2021 at 0900 Child Yes Yes   Sig: Take 1 Tab by mouth daily. pravastatin (PRAVACHOL) 40 mg tablet 11/15/2021 at 2100 Child No Yes   Sig: TAKE 1 TABLET BY MOUTH EVERY NIGHT   valsartan-hydroCHLOROthiazide (DIOVAN-HCT) 160-12.5 mg per tablet 11/16/2021 at 0900 Child No Yes   Sig: TAKE 1/2 TABLET BY MOUTH DAILY      Facility-Administered Medications: None           The review of systems is:  negative for shortness of breath or chest pain      The heart, lungs, and mental status were satisfactory for the administration of anesthesia sedation and for the procedure. I discussed with the patient the objectives, risks, consequences and alternatives to the procedure. The patient's daughter was counseled at length about the risks of jennifer Covid-19 during their perioperative period and any recovery window from their procedure. The patient was made aware that jennifer Covid-19  may worsen their prognosis for recovering from their procedure and lend to a higher morbidity and/or mortality risk. All material risks, benefits, and reasonable alternatives including postponing the procedure were discussed. The patient's daughter does  wish to proceed with the procedure at this time.         Marco Gallegos MD  11/17/2021  2:19 PM

## 2021-11-17 NOTE — PROGRESS NOTES
0720: Bedside shift change report given to LEAH Gallardo (oncoming nurse) by Sophia Lopes RN (offgoing nurse). Report included the following information SBAR, Kardex, ED Summary and Intake/Output. 1005: Consent forms signed by patient's daughter who is POA for EGD procedure today. 1015: Spoke with Endoscopy about signed consent forms and they plan to have transport pick the patient up around 1:15pm and begin procedure at 2pm.     1320: Pt picked up by transport for procedure. Consent papers sent with transport and patient. 1552: Received an order from MD for prn IV hydralazine for SBP greater than 160.     1700: /45 after prn hydralazine was administered. 1900:   End of Shift Note    Bedside shift change report given to LEAH Johnson (oncoming nurse) by Lanie Payan RN (offgoing nurse). Report included the following information SBAR, Kardex, ED Summary, Procedure Summary and Intake/Output    Shift worked:  Day     Shift summary and any significant changes:     PRN Hydralazine ordered for SBP great than 160. One time dose of Haldol was ordered but did not end up being given. Concerns for physician to address:       Zone phone for oncoming shift:          Activity:  Activity Level:  Up with Assistance  Number times ambulated in hallways past shift: 0  Number of times OOB to chair past shift: 3    Cardiac:   Cardiac Monitoring: Yes      Cardiac Rhythm: Sinus Rhythm    Access:   Current line(s): PIV     Genitourinary:   Urinary status: voiding    Respiratory:   O2 Device: None (Room air)  Chronic home O2 use?: N/A  Incentive spirometer at bedside: N/A     GI:     Current diet:  ADULT ORAL NUTRITION SUPPLEMENT Breakfast, Lunch, Dinner; Standard High Calorie/High Protein  DIET ONE TIME MESSAGE  ADULT DIET Regular  Passing flatus: YES  Tolerating current diet: YES       Pain Management:   Patient states pain is manageable on current regimen: N/A    Skin:  Homero Score: 17  Interventions: increase time out of bed and PT/OT consult    Patient Safety:  Fall Score:  Total Score: 4  Interventions: bed/chair alarm, gripper socks and pt to call before getting OOB  High Fall Risk: Yes    Length of Stay:  Expected LOS: 3d 0h  Actual LOS: 1      Marina Moon RN

## 2021-11-17 NOTE — PROGRESS NOTES
Comprehensive Nutrition Assessment    Type and Reason for Visit: Initial, Positive nutrition screen    Nutrition Recommendations/Plan:   · Diet progression per GI   · Recommend adding ONS once diet progress beyond NPO. Nutrition Assessment: Chart reviewed per positive nutrition screen. Presents with symptomatic acute blood loss anemia, iron deficiency anemia, GI bleed, Alzheimer's disease, HTN. PMH consists of hyperlipidemia. Pt currently NPO for EGD today. Pt visited at bedside with daughter present; daughter assisted with answering most questions d/t pt Alzheimer's disease. Daughter reports a recent weight loss of 7 lbs in 2 months. A NFPE was conducted and pt has experienced muscle loss, but this is most likely age related. Unable to determine malnutrition status at this time. Pt stated that she has a good appetite; however, daughter stated that her PO consumption has declined over the last few months. Daughter stated that she prepares all of pt meals. Once diet progresses beyond NPO, recommend adding ONS. Will continue to monitor. Wt Readings from Last 12 Encounters:   11/16/21 59.8 kg (131 lb 13.4 oz)   11/15/21 59.4 kg (131 lb)   11/05/21 59 kg (130 lb)   08/30/21 62.1 kg (137 lb)   05/28/21 64.9 kg (143 lb)   02/19/21 64.4 kg (142 lb)   11/23/20 64 kg (141 lb)   10/21/20 65.5 kg (144 lb 6.4 oz)   10/17/20 64.9 kg (143 lb 1.3 oz)   08/17/20 64.9 kg (143 lb)   02/28/20 66.4 kg (146 lb 6.4 oz)   12/11/19 66.5 kg (146 lb 11.2 oz)     Malnutrition Assessment: Unable to determine at this time   Weight Loss:   (8% loss in 6 months)       Estimated Daily Nutrient Needs:  Energy (kcal): 1258 kcal ( × 1. 3AF); Weight Used for Energy Requirements: Current  Protein (g): 60-72 g/day (1.0-1.2 g/kg BW); Weight Used for Protein Requirements: Current  Fluid (ml/day): 1200 mL; Method Used for Fluid Requirements:      Nutrition Related Findings:  BM: none documented. Labs: reviewed.  Meds: iron sucrose, protonix Wounds:    None       Current Nutrition Therapies:  ADULT ORAL NUTRITION SUPPLEMENT Breakfast, Lunch, Dinner; Standard High Calorie/High Protein  DIET NPO    Anthropometric Measures:  · Height:  5' 1.5\" (156.2 cm)  · Current Body Wt:  59.8 kg (131 lb 13.4 oz)     · Ideal Body Wt:  108 lbs:  122.1 %   · Adjusted Body Weight:   ; Weight Adjustment for: No adjustment   · BMI Category:  Normal weight (BMI 22.0-24.9) age over 72       Nutrition Diagnosis:   · Inadequate protein-energy intake related to cognitive or neurological impairment, altered GI function as evidenced by NPO or clear liquid status due to medical condition    Nutrition Interventions:   Food and/or Nutrient Delivery: Continue NPO  Nutrition Education and Counseling: No recommendations at this time  Coordination of Nutrition Care: No recommendation at this time    Goals:  Pt diet will progress beyond NPO next 2-4 days       Nutrition Monitoring and Evaluation:   Behavioral-Environmental Outcomes: None identified  Food/Nutrient Intake Outcomes: Diet advancement/tolerance  Physical Signs/Symptoms Outcomes: Biochemical data, GI status, Meal time behavior, Nutrition focused physical findings, Skin, Weight    Discharge Planning:     Too soon to determine     Electronically signed by Gabriella Flynn on 11/17/2021 at 1:01 PM

## 2021-11-18 PROBLEM — I48.0 PAROXYSMAL ATRIAL FIBRILLATION (HCC): Status: ACTIVE | Noted: 2021-01-01

## 2021-11-18 PROBLEM — R77.8 ELEVATED TROPONIN: Status: ACTIVE | Noted: 2021-01-01

## 2021-11-18 NOTE — CONSULTS
Cardiology Consult Note       Date of  Admission: 11/16/2021 10:09 AM     Admission type:Emergency     Subjective:      Ms. Bibi Martínez is admitted with anemia/GI bleed. She presented with 2-3 weeks h/o SOB. Noted to have Hg of 7.2. EGD showed healing gastric ulcers. Last night she went into rapid afib. Started on IV amiodarone. Now in sinus rythm. Also noted to have mildly elevated troponin. She has h/o stent to unknown vessel in 2016 at [facility]. No cardiology f/u since then. denies any chest pain. SOB has improved. Lives alone. Daughter at bedside. EKG shows no acute ST changes.     Patient Active Problem List    Diagnosis Date Noted    Paroxysmal atrial fibrillation ([facility] [address] 11/18/2021    Elevated troponin 11/18/2021    Acute blood loss anemia 11/16/2021    GI bleed 11/16/2021    Late onset Alzheimer's dementia without behavioral disturbance ([facility] [address] 08/30/2021    Memory loss 02/19/2021    AK (actinic keratosis) 07/27/2017    Arthritis of right knee 07/27/2017    On statin therapy 07/27/2017    Arteriosclerotic coronary artery disease 07/27/2017    Hyperlipidemia 07/27/2017    Carotid atherosclerosis 07/27/2017    HTN (hypertension) 07/27/2017    History of pneumonia 07/27/2017    Cough 07/27/2017    Back pain 07/27/2017    Shoulder sprain 07/27/2017    Plantar fasciitis 07/27/2017    Fatigue 07/27/2017    Syncope 07/27/2017    SOB (shortness of breath) 09/24/2012    ASD (atrial septal defect) VS PFO on echo 10/14/2010    Pulmonary hypertension, moderate to severe- on echo 10/14/2010    Pneumonia- lobar 10/12/2010    UTI (lower urinary tract infection) 10/12/2010    Hyponatremia 10/12/2010    Hyperglycemia 10/12/2010      Jennifer Gomez MD  Past Medical History:   Diagnosis Date    AK (actinic keratosis) 7/27/2017    Arteriosclerotic coronary artery disease 7/27/2017    Arthritis of right knee 7/27/2017    Back pain 7/27/2017    Carotid atherosclerosis 7/27/2017    Cough 7/27/2017  Fatigue 7/27/2017    History of pneumonia 7/27/2017    HTN (hypertension) 7/27/2017    Hyperlipidemia 7/27/2017    Hypertension     Late onset Alzheimer's dementia without behavioral disturbance ([facility] [address] 8/30/2021    Memory loss 2/19/2021    Need for Streptococcus pneumoniae vaccination 7/27/2017    On statin therapy 7/27/2017    Plantar fasciitis 7/27/2017    Pneumonia     Shoulder sprain 7/27/2017    Syncope 7/27/2017      Past Surgical History:   Procedure Laterality Date    HX GI      ruptured intestine    UPPER GI ENDOSCOPY,BIOPSY  11/17/2021          No Known Allergies   Family History   Problem Relation Age of Onset    Hypertension Mother     Heart Disease Father       Current Facility-Administered Medications   Medication Dose Route Frequency    0.9% sodium chloride infusion  25 mL/hr IntraVENous CONTINUOUS    pantoprazole (PROTONIX) tablet 40 mg  40 mg Oral ACB&D    hydrALAZINE (APRESOLINE) 20 mg/mL injection 10 mg  10 mg IntraVENous Q6H PRN    amiodarone (CORDARONE) 375 mg/250 mL D5W infusion  0.5 mg/min IntraVENous CONTINUOUS    acetaminophen (TYLENOL) tablet 650 mg  650 mg Oral Q6H PRN    0.9% sodium chloride infusion 250 mL  250 mL IntraVENous PRN    pravastatin (PRAVACHOL) tablet 40 mg  40 mg Oral QHS    sodium chloride (NS) flush 5-40 mL  5-40 mL IntraVENous Q8H    sodium chloride (NS) flush 5-40 mL  5-40 mL IntraVENous PRN    ondansetron (ZOFRAN) injection 4 mg  4 mg IntraVENous Q6H PRN    iron sucrose (VENOFER) injection 200 mg  200 mg IntraVENous Q24H    losartan (COZAAR) tablet 100 mg  100 mg Oral DAILY    And    hydroCHLOROthiazide (HYDRODIURIL) tablet 12.5 mg  12.5 mg Oral DAILY         Review of Symptoms:  Review of Systems   Constitutional: Negative. Negative for chills and fever. HENT: Negative. Negative for hearing loss. Respiratory: Positive for shortness of breath. Negative for cough and hemoptysis. Cardiovascular: Negative.   Negative for chest pain, palpitations, orthopnea, claudication, leg swelling and PND. Gastrointestinal: Negative. Negative for nausea and vomiting. Musculoskeletal: Negative for myalgias. Skin: Negative. Negative for rash. Neurological: Negative. Negative for dizziness, loss of consciousness and headaches. Physical Exam    Physical Exam  Vitals and nursing note reviewed. Exam conducted with a chaperone present. Cardiovascular:      Rate and Rhythm: Normal rate and regular rhythm. Heart sounds: Murmur heard. Pulmonary:      Breath sounds: Normal breath sounds. Musculoskeletal:      Right lower leg: No edema. Left lower leg: Edema present. Skin:     General: Skin is warm. Neurological:      Mental Status: She is alert and oriented to person, place, and time.    Psychiatric:         Mood and Affect: Mood normal.          Cardiographics    Telemetry: normal sinus rhythm  ECG: normal EKG, normal sinus rhythm, unchanged from previous tracings, nonspecific ST and T waves changes  Echocardiogram: Not done    Labs:   Recent Results (from the past 24 hour(s))   POC H. PYLORI, TISSUE    Collection Time: 11/17/21  2:03 PM   Result Value Ref Range    H. pylori from tissue Negative Negative    Positive control Positive     Negative control Negative     Lot no. [id number]    EKG, 12 LEAD, INITIAL    Collection Time: 11/17/21 10:25 PM   Result Value Ref Range    Ventricular Rate 109 BPM    QRS Duration 110 ms    Q-T Interval 312 ms    QTC Calculation (Bezet) 420 ms    Calculated R Axis 2 degrees    Calculated T Axis 157 degrees    Diagnosis       Atrial fibrillation with rapid ventricular response  Incomplete left bundle branch block  Left ventricular hypertrophy with repolarization abnormality ( [employer]   product )  Abnormal ECG     Confirmed by Radha Sarabia M.D. ([id number]) on 11/18/2021 8:37:07 AM  Also confirmed by Gume Lugo ([id number])  on 11/18/2021 8:43:03 AM     NT-PRO BNP    Collection Time: 11/17/21 11:26 PM   Result Value Ref Range    NT pro- (H) <450 PG/ML   TROPONIN-HIGH SENSITIVITY    Collection Time: 11/17/21 11:26 PM   Result Value Ref Range    Troponin-High Sensitivity 42 0 - 51 ng/L   HGB & HCT    Collection Time: 11/17/21 11:26 PM   Result Value Ref Range    HGB 9.3 (L) 11.5 - 16.0 g/dL    HCT 30.7 (L) 35.0 - 47.0 %   TROPONIN-HIGH SENSITIVITY    Collection Time: 11/18/21  3:29 AM   Result Value Ref Range    Troponin-High Sensitivity 74 (HH) 0 - 51 ng/L   CBC WITH AUTOMATED DIFF    Collection Time: 11/18/21  6:53 AM   Result Value Ref Range    WBC 8.7 3.6 - 11.0 K/uL    RBC 3.66 (L) 3.80 - 5.20 M/uL    HGB 9.5 (L) 11.5 - 16.0 g/dL    HCT 31.3 (L) 35.0 - 47.0 %    MCV 85.5 80.0 - 99.0 FL    MCH 26.0 26.0 - 34.0 PG    MCHC 30.4 30.0 - 36.5 g/dL    RDW 16.4 (H) 11.5 - 14.5 %    PLATELET 841 898 - 590 K/uL    MPV 9.7 8.9 - 12.9 FL    NRBC 0.0 0  WBC    ABSOLUTE NRBC 0.00 0.00 - 0.01 K/uL    NEUTROPHILS 73 32 - 75 %    LYMPHOCYTES 15 12 - 49 %    MONOCYTES 10 5 - 13 %    EOSINOPHILS 1 0 - 7 %    BASOPHILS 0 0 - 1 %    IMMATURE GRANULOCYTES 1 (H) 0.0 - 0.5 %    ABS. NEUTROPHILS 6.3 1.8 - 8.0 K/UL    ABS. LYMPHOCYTES 1.3 0.8 - 3.5 K/UL    ABS. MONOCYTES 0.9 0.0 - 1.0 K/UL    ABS. EOSINOPHILS 0.1 0.0 - 0.4 K/UL    ABS. BASOPHILS 0.0 0.0 - 0.1 K/UL    ABS. IMM.  GRANS. 0.1 (H) 0.00 - 0.04 K/UL    DF AUTOMATED     TROPONIN-HIGH SENSITIVITY    Collection Time: 11/18/21  6:53 AM   Result Value Ref Range    Troponin-High Sensitivity 85 (HH) 0 - 51 ng/L        Assessment:     Assessment:         Hospital Problems  Date Reviewed: 11/17/2021          Codes Class Noted POA    Paroxysmal atrial fibrillation ([facility] [address] ICD-10-CM: I48.0  ICD-9-CM: 427.31  11/18/2021 Unknown        Elevated troponin ICD-10-CM: R77.8  ICD-9-CM: 790.6  11/18/2021 Unknown        Acute blood loss anemia ICD-10-CM: D62  ICD-9-CM: 285.1  11/16/2021 Unknown        GI bleed ICD-10-CM: K92.2  ICD-9-CM: 578.9  11/16/2021 Unknown Arteriosclerotic coronary artery disease ICD-10-CM: I25.10  ICD-9-CM: 414.00  7/27/2017 Yes               Plan:     Paroxysmal afib-  Transient in the setting of significant anemia. Has resolved. Off amiodarone. Will add low dose betablocker. Can d/c HCTZ if BP is an issue. No need for anticoagulation with h/o GI bleed, transient nature of arrhythmia. Elevated troponin- probably type 2 MI secondary to supply demand mismatch. Will check echo. Stress test tomorrow. Thank you for the consult. Will follow.     Ruba Barnhart MD

## 2021-11-18 NOTE — PROGRESS NOTES
Received message from patient's nurse stating:    Patient seems to have gone into afib, rate between 110-130s. /55. Patient is stating she has some chest discomfort but is unable to rate the on a scale of 1-10. Poor historian as she has dementia. What can we do for the afib and chest discomfort? 80-year-old female has been admitted for GI bleed and underwent EGD today. Does not have a documented history of atrial fibrillation. Ordered stat EKG which was done and is consistent with atrial fibrillation    Presented to patient's bedside and evaluated patient. Patient denies chest pain or chest discomfort at this time however does tell me that she was having some chest discomfort short time ago  She also denies shortness of breath at this time however states that she was having some shortness of breath around the time that she was having chest discomfort. Assessment and plan:    · Amiodarone bolus followed by drip  · Cardiology consult  · Echocardiogram  · Serial troponin  · NT proBNP             Please note that this note was dictated using Dragon computer voice recognition software. Quite often unanticipated grammatical, syntax, homophones, and other interpretive errors are inadvertently transcribed by the computer software. Please disregard these errors. Please excuse any errors that have escaped final proofreading.

## 2021-11-18 NOTE — PROGRESS NOTES
End of Shift Note    Bedside shift change report given to Paulina (oncoming nurse) by Layc Barrera (offgoing nurse). Report included the following information SBAR, Kardex and MAR    Shift worked:  night     Shift summary and any significant changes:     2240 patient went into afib RVR, stat EKG completed and NP notified. Amio drip started, cardiology consult needs to be called in the morning. 0415 critical troponin of 74 reported to ROSS Roque. Patient had a 2.91 second pause while on the amio drip, drip was stopped and NP notified. Concerns for physician to address:       Zone phone for oncoming shift:          Activity:  Activity Level: Up with Assistance  Number times ambulated in hallways past shift: 0  Number of times OOB to chair past shift: 0    Cardiac:   Cardiac Monitoring: Yes      Cardiac Rhythm: Sinus Rhythm    Access:   Current line(s): PIV     Genitourinary:   Urinary status: voiding    Respiratory:   O2 Device: None (Room air)  Chronic home O2 use?: NO  Incentive spirometer at bedside: NO     GI:     Current diet:  ADULT ORAL NUTRITION SUPPLEMENT Breakfast, Lunch, Dinner; Standard High Calorie/High Protein  DIET ONE TIME MESSAGE  ADULT DIET Regular  Passing flatus: YES  Tolerating current diet: YES       Pain Management:   Patient states pain is manageable on current regimen: YES    Skin:  Homero Score: 17  Interventions: increase time out of bed    Patient Safety:  Fall Score:  Total Score: 4  Interventions: bed/chair alarm, assistive device (walker, cane, etc), gripper socks, pt to call before getting OOB and stay with me (per policy)  High Fall Risk: Yes    Length of Stay:  Expected LOS: 3d 0h  Actual LOS: [address]

## 2021-11-18 NOTE — PROGRESS NOTES
Problem: Mobility Impaired (Adult and Pediatric)  Goal: *Acute Goals and Plan of Care (Insert Text)  Description: FUNCTIONAL STATUS PRIOR TO ADMISSION: Patient was modified independent using a rollator for functional mobility \"when I feel I need it, I know when I do\". Daughter states that she has had some falls recently. HOME SUPPORT PRIOR TO ADMISSION: The patient lived alone with daughter Pau Hodge to provide assistance. She has supportive neighbors, as well. Physical Therapy Goals  Initiated 11/18/2021   1. Patient will transfer from bed to chair and chair to bed with modified independence using the least restrictive device within 7 day(s). 2.  Patient will perform sit to stand with modified independence within 7 day(s). 3.  Patient will ambulate with modified independence for 150 feet with the least restrictive device within 7 day(s). 4.  Patient will ascend/descend 3 stairs with 1 handrail(s) with supervision/set-up within 7 day(s). Outcome: Not Met   PHYSICAL THERAPY EVALUATION  Patient: Erin Alvares (35 y.o. female)  Date: 11/18/2021  Primary Diagnosis: GI bleed [K92.2]  Acute blood loss anemia [D62]  Procedure(s) (LRB):  ESOPHAGOGASTRODUODENOSCOPY (EGD) (N/A)  ESOPHAGOGASTRODUODENAL (EGD) BIOPSY (N/A) 1 Day Post-Op   Precautions: fall risk       ASSESSMENT  Based on the objective data described below, the patient presents with general decrease in strength/activity tolerance, decreased standing balance/tolerance, and decreased transfers/gait as compared to her previous baseline of independent/modified independent. She is impulsive with mobility at times (likely due to dementia), and daughter states that patient has had some falls in her home recently. She would benefit from supervision in her home for safety, but that is not available/patient is resistant to personal care aide. She will benefit from PT Assessment for home safety and high-level balance training.  Daughter is aware that patient may benefit from Assisted Living in the near future. Current Level of Function Impacting Discharge (mobility/balance): independent bed mobility, contact guard assist for transfers/gait with rolling walker 75 feet    Functional Outcome Measure: The patient scored 14/20 on the Elder Mobility Scale outcome measure which is borderline between independence/borderline independence with safe ADL/functional mobility. Other factors to consider for discharge: lives alone, will have no 24/7 supervision at discharge, has rollator     Patient will benefit from skilled therapy intervention to address the above noted impairments. PLAN :  Recommendations and Planned Interventions: transfer training, gait training, therapeutic exercises, neuromuscular re-education, patient and family training/education, and therapeutic activities      Frequency/Duration: Patient will be followed by physical therapy:  4 times a week to address goals. Recommendation for discharge: (in order for the patient to meet his/her long term goals)  Physical therapy at least 2 days/week in the home     This discharge recommendation:  Has been made in collaboration with the attending provider and/or case management    IF patient discharges home will need the following DME: patient owns DME required for discharge         SUBJECTIVE:   Patient stated That laddaisy is a gem. I can't remember her name, though.  Patient's neighbor is visiting at start of evaluation    OBJECTIVE DATA SUMMARY:   HISTORY:    Past Medical History:   Diagnosis Date    AK (actinic keratosis) 7/27/2017    Arteriosclerotic coronary artery disease 7/27/2017    Arthritis of right knee 7/27/2017    Back pain 7/27/2017    Carotid atherosclerosis 7/27/2017    Cough 7/27/2017    Fatigue 7/27/2017    History of pneumonia 7/27/2017    HTN (hypertension) 7/27/2017    Hyperlipidemia 7/27/2017    Hypertension     Late onset Alzheimer's dementia without behavioral disturbance ([facility] [address] 8/30/2021    Memory loss 2/19/2021    Need for Streptococcus pneumoniae vaccination 7/27/2017    On statin therapy 7/27/2017    Plantar fasciitis 7/27/2017    Pneumonia     Shoulder sprain 7/27/2017    Syncope 7/27/2017     Past Surgical History:   Procedure Laterality Date    HX GI      ruptured intestine    UPPER GI ENDOSCOPY,BIOPSY  11/17/2021            Personal factors and/or comorbidities impacting plan of care: dementia, history of falls, limited family assist available    Home Situation  Home Environment: Private residence  # Steps to Enter: 3  Rails to Enter: Yes  Hand Rails : Right  One/Two Story Residence: One story  Living Alone: Yes  Support Systems: Child(tony), Friend/Neighbor, [employer]  Patient Expects to be Discharged to[de-identified] [employer]  Current DME Used/Available at Home: [address], rollator, [address] chair, Grab bars  Tub or Shower Type: Shower    EXAMINATION/PRESENTATION/DECISION MAKING:   Critical Behavior:  Neurologic State: Alert (dementia at baseline--very talkative)  Orientation Level: Oriented to person, Oriented to place  Cognition: Decreased attention/concentration, Follows commands, Memory loss, Poor safety awareness  Safety/Judgement: Decreased awareness of need for assistance, Decreased awareness of need for safety, Decreased insight into deficits, Fall prevention  Hearing:   Auditory  Auditory Impairment: Hearing aid(s), Hard of hearing, bilateral  Hearing Aids/Status: Bilateral, With patient  Skin:  + telemetry, IV sites both arms  Edema: none noted  Range Of Motion:  AROM: Within functional limits                       Strength:    Strength: Generally decreased, functional                    Tone & Sensation:   Tone: Normal              Sensation: Intact               Coordination:  Coordination: Within functional limits  Vision:   Acuity:  (not formally tested)  Corrective Lenses:  (none present)  Functional Mobility:  Bed Mobility:     Supine to Sit: Independent  Sit to Supine: Independent     Transfers:  Sit to Stand: Supervision  Stand to Sit: Supervision        Bed to Chair: Contact guard assistance; Adaptive equipment              Balance:   Sitting: Intact  Standing: Impaired  Standing - Static: Good  Standing - Dynamic : Fair  Ambulation/Gait Training:  Distance (ft): 75 Feet (ft)  Assistive Device: Walker, rolling; Gait belt  Ambulation - Level of Assistance: Contact guard assistance; Assist x1        Gait Abnormalities: Decreased step clearance  Right Side Weight Bearing: Full  Left Side Weight Bearing: Full        Speed/Silva: Accelerated  Step Length: Left shortened; Right shortened       Therapeutic Exercises:   Gentle AROM from supine and sitting prior to mobility training    Functional Measure:    Elder Mobility Scale    14/20         Scores under 10 - generally these patients are dependent in mobility maneuvers; require help with  basic ADL, such as transfers, toileting and dressing. Scores between 10 - 13 - generally these patients are borderline in terms of safe mobility and  independence in ADL i.e. they require some help with some mobility maneuvers. Scores over 14 - Generally these patients are able to perform mobility maneuvers alone and safely  and are independent in basic ADL.              Physical Therapy Evaluation Charge Determination   History Examination Presentation Decision-Making   MEDIUM  Complexity : 1-2 comorbidities / personal factors will impact the outcome/ POC  LOW Complexity : 1-2 Standardized tests and measures addressing body structure, function, activity limitation and / or participation in recreation  LOW Complexity : Stable, uncomplicated  LOW Complexity : FOTO score of       Based on the above components, the patient evaluation is determined to be of the following complexity level: LOW     Pain Rating:  Patient denies pain    Activity Tolerance:   Fair, tolerates ADLs without rest breaks, and SpO2 stable on RA    After treatment patient left in no apparent distress:   Sitting in chair and being transported off floor for testing, nursing aware    COMMUNICATION/EDUCATION:   The patients plan of care was discussed with: Occupational therapist, Registered nurse, and Case management. Fall prevention education was provided and the patient/caregiver indicated understanding., Patient/family have participated as able in goal setting and plan of care. , and Patient/family agree to work toward stated goals and plan of care.     Thank you for this referral.  Shyanne Ordonez, PT   Time Calculation: 38 mins

## 2021-11-18 NOTE — PROGRESS NOTES
Hospitalist Progress Note    NAME: Neda Shaw   :  1932   MRN:  [id number]       Assessment / Plan:    Symptomatic acute blood loss anemia  Iron deficiency anemia  GI bleed  Alzheimer disease  Hypertension  -Hemoglobin proved to 9.3, s/p 1 unit of PRBC during hospitalization  -We'll start on ferrous sulfate after completing the course of Venofer in the hospital.  Today the last dose of Venofer  -Protonix twice daily. -GI on board-did EGD yesterday. -EGD findings-gastric erosions, small hiatal hernia.  -Continue the home medication for the blood pressure. A. fib with RVR  Elevated troponin  -Currently in sinus rhythm, was there for a brief time.  -Discontinue the amiodarone drip.  -Started on low-dose beta-blocker by cardiology.  -Cardiology on board-appreciate recommendations.  -Echo pending.  -Troponin elevated but trended flat likely secondary to demand ischemia.  -Plan to do stress test tomorrow. 18.5 - 24.9 Normal weight / Body mass index is 24.51 kg/m². Estimated discharge date:   Barriers:    Code status: Full  Prophylaxis: SCD's  Recommended Disposition: Home w/Family     Subjective:     Chief Complaint / Reason for Physician Visit  Patient seen today, doing fine, no complaints. States that the chest pain is resolved, no shortness of breath. Objective:     VITALS:   Last 24hrs VS reviewed since prior progress note.  Most recent are:  Patient Vitals for the past 24 hrs:   Temp Pulse Resp BP SpO2   21 1127 98 °F (36.7 °C) 66 18 (!) 136/45 96 %   21 0859 -- 68 -- (!) 131/42 --   21 0751 98.2 °F (36.8 °C) 62 18 (!) 120/43 95 %   21 0257 97.8 °F (36.6 °C) 86 18 107/64 98 %   21 0031 -- 99 -- 105/61 --   21 0014 -- (!) 105 -- (!) 122/46 --   21 2336 -- 98 -- (!) 103/55 --   21 2243 97.6 °F (36.4 °C) (!) 120 16 (!) 131/55 95 %   21 97.5 °F (36.4 °C) 74 17 (!) 129/42 95 %   21 -- 79 -- (!) 129/45 94 % 11/17/21 1517 97.4 °F (36.3 °C) 75 19 (!) 163/50 96 %   11/17/21 1450 -- 78 18 (!) 162/77 96 %   11/17/21 1445 -- 74 25 (!) 168/41 97 %   11/17/21 1440 -- 78 14 (!) 162/61 --   11/17/21 1331 -- 68 23 (!) 190/43 99 %       Intake/Output Summary (Last 24 hours) at 11/18/2021 1309  Last data filed at 11/18/2021 1100  Gross per 24 hour   Intake 780 ml   Output --   Net 780 ml        I had a face to face encounter and independently examined this patient on 11/18/2021, as outlined below:  PHYSICAL EXAM:  General: WD, WN. Alert, cooperative, no acute distress    EENT:  EOMI. Anicteric sclerae. MMM  Resp:  CTA bilaterally, no wheezing or rales. No accessory muscle use  CV:  Regular  rhythm,  No edema  GI:  Soft, Non distended, Non tender. +Bowel sounds  Neurologic:  Alert and oriented X 3, normal speech,   Psych:   Good insight. Not anxious nor agitated  Skin:  No rashes. No jaundice    Reviewed most current lab test results and cultures  YES  Reviewed most current radiology test results   YES  Review and summation of old records today    NO  Reviewed patient's current orders and MAR    YES  PMH/SH reviewed - no change compared to H&P  ________________________________________________________________________  Care Plan discussed with:    Comments   Patient x    Family  x daughter   RN x    Care Manager     Consultant                       x Multidiciplinary team rounds were held today with , nursing, pharmacist and clinical coordinator. Patient's plan of care was discussed; medications were reviewed and discharge planning was addressed.      ________________________________________________________________________  Total NON critical care TIME:  35   Minutes    Total CRITICAL CARE TIME Spent:   Minutes non procedure based      Comments   >50% of visit spent in counseling and coordination of care     ________________________________________________________________________  Renetta Ziegler MD     Procedures: see electronic medical records for all procedures/Xrays and details which were not copied into this note but were reviewed prior to creation of Plan. LABS:  I reviewed today's most current labs and imaging studies. Pertinent labs include:  Recent Labs     11/18/21  0653 11/17/21 2326 11/17/21 0544 11/16/21 2125 11/16/21  1024   WBC 8.7  --  6.5  --  7.1   HGB 9.5* 9.3* 8.8*   < > 7.2*   HCT 31.3* 30.7* 28.5*   < > 24.3*     --  235  --  309    < > = values in this interval not displayed.      Recent Labs     11/17/21  0544 11/16/21  1024    137   K 3.9 3.7    102   CO2 24 27   * 253*   BUN 19 24*   CREA 0.83 1.01   CA 8.9 8.8   ALB  --  3.1*   TBILI  --  0.2   ALT  --  14       Signed: Danilo Mckeon MD

## 2021-11-18 NOTE — PROGRESS NOTES
Transition of Care Plan:     RUR: 12 %, LOW risk  Disposition: Home with follow up appointments  Follow up appointments: PCP and specialists as indicated. DME needed: None  Transportation at Discharge: Patient daughter   Travis August or means to access home:  Yes      IM Medicare Letter: 2nd IM letter  Is patient a BCPI-A Bundle:    Bundle letter will be distributed by unit CM if pt meets bundle criteria. If yes, was Bundle Letter given?:     Caregiver Contact: Katalina Ernandez [phone]  Discharge Caregiver contacted prior to discharge? Unit CM to follow up before discharge    Initial note:  Chart reviewed. CM met with pt daughter Ansley Simons to discuss pt disposition after d/c. The pt lives alone, has been falling more recently, and has some dementia according to Ansley Simons. CM talked with her about securing PT/OT evaluations to help determine disposition. She agreeable to this. CM contacted Dr. Ninfa Ramos via [employer] to request PT/OT consults. CM will continue to monitor for d/c needs.     Emily Sarkar, MSN  Care Manager  [phone]

## 2021-11-18 NOTE — PROGRESS NOTES
Problem: Upper and Lower GI Bleed: Day 2  Goal: Activity/Safety  Outcome: Progressing Towards Goal  Goal: Consults, if ordered  Outcome: Progressing Towards Goal  Goal: Diagnostic Test/Procedures  Outcome: Progressing Towards Goal  Goal: Nutrition/Diet  Outcome: Progressing Towards Goal  Goal: Discharge Planning  Outcome: Progressing Towards Goal     Problem: Pressure Injury - Risk of  Goal: *Prevention of pressure injury  Description: Document Homero Scale and appropriate interventions in the flowsheet.   Outcome: Progressing Towards Goal  Note: Pressure Injury Interventions:  Sensory Interventions: Assess changes in LOC, Assess need for specialty bed    Moisture Interventions: Absorbent underpads, Apply protective barrier, creams and emollients    Activity Interventions: Assess need for specialty bed, Increase time out of bed    Mobility Interventions: Assess need for specialty bed, HOB 30 degrees or less, PT/OT evaluation    Nutrition Interventions: Document food/fluid/supplement intake    Friction and Shear Interventions: Apply protective barrier, creams and emollients, HOB 30 degrees or less, Minimize layers

## 2021-11-18 NOTE — PROGRESS NOTES
OCCUPATIONAL THERAPY EVALUATION/DISCHARGE  Patient: Fransico Freeman (41 y.o. female)  Date: 11/18/2021  Primary Diagnosis: GI bleed [K92.2]  Acute blood loss anemia [D62]  Procedure(s) (LRB):  ESOPHAGOGASTRODUODENOSCOPY (EGD) (N/A)  ESOPHAGOGASTRODUODENAL (EGD) BIOPSY (N/A) 1 Day Post-Op   Precautions: fall       ASSESSMENT  Based on the objective data described below, the patient presents with baseline dementia and history of falls at baseline, decreased insight into deficits, mildly decreased balance and generalized weakness impairing independence and safety during adls and functional mobility. Pt is functioning close to her baseline, but will benefit from additional assistance to ensure safety at home. At this time pt does not need acute OT services. At discharge recommend returning home with additional assistance for adls/mobility, continued assist from family for IADLs. Pt would also benefit from [facility] Evaluation. Assisted Living is appropriate discharge plan for pt and family to consider. Current Level of Function (ADLs/self-care): up to CGA for adls and mobility. Functional Outcome Measure: The patient scored 60/100 on the Barthel Index outcome measure which is indicative of \"minimally independent\". Other factors to consider for discharge: hx of dementia and falls, decreased insight per daughter, will likely need more assistance initially than family is able to provide at this time. PLAN :  Recommend with staff: encourage pt to participate in self care and adl mobility with help of nursing    Recommendation for discharge: (in order for the patient to meet his/her long term goals)  To be determined: Will benefit from [facility] Safety Evaluation.     This discharge recommendation:  Has been discussed the attending provider and/or case management    IF patient discharges home will need the following DME: likely none       SUBJECTIVE:   Patient was very verbose and detailed in her stories    OBJECTIVE DATA SUMMARY:   HISTORY:   Past Medical History:   Diagnosis Date    AK (actinic keratosis) 7/27/2017    Arteriosclerotic coronary artery disease 7/27/2017    Arthritis of right knee 7/27/2017    Back pain 7/27/2017    Carotid atherosclerosis 7/27/2017    Cough 7/27/2017    Fatigue 7/27/2017    History of pneumonia 7/27/2017    HTN (hypertension) 7/27/2017    Hyperlipidemia 7/27/2017    Hypertension     Late onset Alzheimer's dementia without behavioral disturbance ([facility] [address] 8/30/2021    Memory loss 2/19/2021    Need for Streptococcus pneumoniae vaccination 7/27/2017    On statin therapy 7/27/2017    Plantar fasciitis 7/27/2017    Pneumonia     Shoulder sprain 7/27/2017    Syncope 7/27/2017     Past Surgical History:   Procedure Laterality Date    HX GI      ruptured intestine    UPPER GI ENDOSCOPY,BIOPSY  11/17/2021            Prior Level of Function/Environment/Context: Pt lives alone, her daughter checks on her and sometimes takes pt to her home during the day, returning her to her home at night. Daughter reports that pt is able to perform her self care independently. Daughter provides transportation, meals. Pt has a life alert that daugther reports pt finally agreed to use; pt has had 3 to 4 falls with a year. Since pt has been anemic, she has been sleeping quite a bit and is less active during the day according to her daughter.     Expanded or extensive additional review of patient history:   Home Situation  Home Environment: Private residence  # Steps to Enter: 3  Rails to Enter: Yes  Hand Rails : Right  One/Two Story Residence: One story  Living Alone: Yes  Support Systems: Child(tony), Friend/Neighbor, [employer]  Patient Expects to be Discharged to[de-identified] [employer]  Current DME Used/Available at Home: Walker, rollator, Shower chair, Grab bars  Tub or Shower Type: Shower    Hand dominance: Right    EXAMINATION OF PERFORMANCE DEFICITS:  Cognitive/Behavioral Status:  Neurologic State: Alert (dementia at baseline--very talkative)  Orientation Level: Oriented to person; Oriented to place  Cognition: Decreased attention/concentration; Follows commands; Memory loss; Poor safety awareness  Perception: Appears intact  Perseveration: No perseveration noted (very talkative--decreased insight into her talkative nature)  Safety/Judgement: Decreased awareness of need for assistance; Decreased awareness of need for safety; Decreased insight into deficits; Fall prevention    Skin: appears fragile    Edema: none observed    Hearing: Auditory  Auditory Impairment: Hearing aid(s), Hard of hearing, bilateral  Hearing Aids/Status: Bilateral, With patient    Vision/Perceptual:                           Acuity:  (not formally tested)    Corrective Lenses:  (none present)    Range of Motion:  BUEs:    AROM: Within functional limits         kyphotic posture in sitting and standing                Strength:  BUEs  Strength: Generally decreased, functional         generalized weakness overall       Coordination:  Coordination: Within functional limits  Fine Motor Skills-Upper: Left Intact; Right Intact    Gross Motor Skills-Upper: Left Intact; Right Intact    Tone & Sensation:    Tone: Normal  Sensation: Intact                      Balance:  Sitting: Intact  Standing: Impaired  Standing - Static: Good  Standing - Dynamic : Fair    Functional Mobility and Transfers for ADLs:  Bed Mobility:  Supine to Sit: Independent    Transfers:  Sit to Stand: Supervision  Stand to Sit: Supervision  Bed to Chair: Contact guard assistance;  Adaptive equipment  Bathroom Mobility: Supervision/set up; Contact guard assistance  Toilet Transfer : Contact guard assistance  Assistive Device : Walker, rolling (educ in safe use, but seems to get in her way--pt reports at home pt only uses her rollator/walker when she feels she needs it)    ADL Assessment:  Feeding: Independent    Oral Facial Hygiene/Grooming: Stand-by assistance; Setup    Bathing: Supervision; Contact guard assistance    Upper Body Dressing: Setup    Lower Body Dressing: Supervision; Contact guard assistance                     ADL Intervention and task modifications:     Pt moves quickly using the RW during evaluation. Daughter reports that pt is very talkative and moving quickly as pt had coffee this afternoon. Pt demonstrates decreased attention/concentration and decreased safety awareness. She has a history of dementia and falls PTA                                Cognitive Retraining  Safety/Judgement: Decreased awareness of need for assistance; Decreased awareness of need for safety; Decreased insight into deficits; Fall prevention      Functional Measure:    Barthel Index:  Bathin  Bladder: 5  Bowels: 10  Groomin  Dressing: 10  Feeding: 10  Mobility: 0  Stairs: 0  Toilet Use: 10  Transfer (Bed to Chair and Back): 10  Total: 60/100      The Barthel ADL Index: Guidelines  1. The index should be used as a record of what a patient does, not as a record of what a patient could do. 2. The main aim is to establish degree of independence from any help, physical or verbal, however minor and for whatever reason. 3. The need for supervision renders the patient not independent. 4. A patient's performance should be established using the best available evidence. Asking the patient, friends/relatives and nurses are the usual sources, but direct observation and common sense are also important. However direct testing is not needed. 5. Usually the patient's performance over the preceding 24-48 hours is important, but occasionally longer periods will be relevant. 6. Middle categories imply that the patient supplies over 50 per cent of the effort. 7. Use of aids to be independent is allowed.     Score Interpretation (from [address] [place] [address])    Independent   60-79 Minimally independent   40-59 Partially dependent   20-39 Very dependent   <20 Totally dependent     -Miguelina Valdivia, Barthel, D.W. ([id number]). Functional evaluation: the Barthel Index. [address] ([address] (1997). The Barthel activities of daily living index: self-reporting versus actual performance in the old (> or = 75 years). Journal of [facility] 45(7), [facility], KRISTYN, Warner Treviño., [facility] Adele. (1999). Measuring the change in disability after inpatient rehabilitation; comparison of the responsiveness of the Barthel Index and Functional McNabb Measure. Journal of Neurology, Neurosurgery, and Psychiatry, 66(4), 641-795. Colton Light, N.J.A, PAPA Coleman, & Fannie Fuller MMARLEN. (2004) Assessment of post-stroke quality of life in cost-effectiveness studies: The usefulness of the Barthel Index and the EuroQoL-5D. Quality of Life Research, 15, 514-86       Occupational Therapy Evaluation Charge Determination   History Examination Decision-Making   MEDIUM Complexity : Expanded review of history including physical, cognitive and psychosocial  history  MEDIUM Complexity : 3-5 performance deficits relating to physical, cognitive , or psychosocial skils that result in activity limitations and / or participation restrictions MEDIUM Complexity : Patient may present with comorbidities that affect occupational performnce.  Miniml to moderate modification of tasks or assistance (eg, physical or verbal ) with assesment(s) is necessary to enable patient to complete evaluation       Based on the above components, the patient evaluation is determined to be of the following complexity level: MEDIUM  Pain Rating:  No complaints of pain    Activity Tolerance:   Good   Patient Vitals for the past 4 hrs:   Temp Pulse Resp BP SpO2   11/18/21 1357 -- -- -- 116/80 --   11/18/21 1352 -- 78 -- (!) 132/52  Post activity --   11/18/21 1347 -- 72 -- 118/77 standing 97 %   11/18/21 1342 -- 69 -- 124/75 97 %     After treatment patient left in no apparent distress:    Caregiver / family present and pt went off the floor for testing at end of tx session    COMMUNICATION/EDUCATION:   The patients plan of care was discussed with: Physical therapist and Registered nurse.      Thank you for this referral.  Brigitte Liriano, OTR/L   34 minutes

## 2021-11-18 NOTE — PROGRESS NOTES
Gastroenterology Daily Progress Note   (Naeem Celeste PA-C for Dr. Antonio Moseley)   [address] [place] [address] Dr Galvan Date: 11/16/2021       Subjective:     Patient seen during morning rounds, feeling well and wanting to go home. Daughter at bedside. Results for Daniella Conway (MRN [id number]) as of 11/18/2021 09:06   Ref. Range 11/16/2021 10:24 11/16/2021 21:25 11/17/2021 05:44 11/17/2021 23:26 11/18/2021 06:53   HGB Latest Ref Range: 11.5 - 16.0 g/dL 7.2 (L) 8.6 (L) 8.8 (L) 9.3 (L) 9.5 (L)   HCT Latest Ref Range: 35.0 - 47.0 % 24.3 (L) 27.8 (L) 28.5 (L) 30.7 (L) 31.3 (L)   Received one unit on 11/16    EGD done by Dr. Maria D Hobbs yesterday reviewed with pt and family: Impressions:  Hiatal hernia  Healing gastric ulcers  Path pending    Results for Daniella Conway (MRN [id number]) as of 11/18/2021 09:06   Ref.  Range 11/17/2021 14:03   H. pylori from tissue Latest Ref Range: Negative  Negative   Positive control Unknown Positive   Negative control Unknown Negative     Events noted from overnight with A. Fib and bump in trop    Current Facility-Administered Medications   Medication Dose Route Frequency    0.9% sodium chloride infusion  25 mL/hr IntraVENous CONTINUOUS    pantoprazole (PROTONIX) tablet 40 mg  40 mg Oral ACB&D    hydrALAZINE (APRESOLINE) 20 mg/mL injection 10 mg  10 mg IntraVENous Q6H PRN    amiodarone (CORDARONE) 375 mg/250 mL D5W infusion  0.5 mg/min IntraVENous CONTINUOUS    acetaminophen (TYLENOL) tablet 650 mg  650 mg Oral Q6H PRN    0.9% sodium chloride infusion 250 mL  250 mL IntraVENous PRN    pravastatin (PRAVACHOL) tablet 40 mg  40 mg Oral QHS    sodium chloride (NS) flush 5-40 mL  5-40 mL IntraVENous Q8H    sodium chloride (NS) flush 5-40 mL  5-40 mL IntraVENous PRN    ondansetron (ZOFRAN) injection 4 mg  4 mg IntraVENous Q6H PRN    iron sucrose (VENOFER) injection 200 mg  200 mg IntraVENous Q24H    losartan (COZAAR) tablet 100 mg  100 mg Oral DAILY    And    hydroCHLOROthiazide (HYDRODIURIL) tablet 12.5 mg  12.5 mg Oral DAILY        Objective:     Visit Vitals  BP (!) 131/42   Pulse 68   Temp 98.2 °F (36.8 °C)   Resp 18   Ht 5' 1.5\" (1.562 m)   Wt 59.8 kg (131 lb 13.4 oz)   SpO2 95%   BMI 24.51 kg/m²   Blood pressure (!) 131/42, pulse 68, temperature 98.2 °F (36.8 °C), resp. rate 18, height 5' 1.5\" (1.562 m), weight 59.8 kg (131 lb 13.4 oz), SpO2 95 %. No intake/output data recorded. 11/16 1901 - 11/18 0700  In: 733.8 [P.O.:50; I.V.:300]  Out: -       Intake/Output Summary (Last 24 hours) at 11/18/2021 0906  Last data filed at 11/17/2021 1440  Gross per 24 hour   Intake 300 ml   Output --   Net 300 ml         Physical Exam:       General: Pleasant, elderly WF, NAD  Chest:  CTA, No rhonchi, rales or rubs.   Heart: S1, S2, RRR  GI: Soft, NT, ND + bowel sounds  Extremities: no edema      Labs:       Recent Results (from the past 24 hour(s))   POC H. PYLORI, TISSUE    Collection Time: 11/17/21  2:03 PM   Result Value Ref Range    H. pylori from tissue Negative Negative    Positive control Positive     Negative control Negative     Lot no. [id number]    EKG, 12 LEAD, INITIAL    Collection Time: 11/17/21 10:25 PM   Result Value Ref Range    Ventricular Rate 109 BPM    QRS Duration 110 ms    Q-T Interval 312 ms    QTC Calculation (Bezet) 420 ms    Calculated R Axis 2 degrees    Calculated T Axis 157 degrees    Diagnosis       Atrial fibrillation with rapid ventricular response  Incomplete left bundle branch block  Left ventricular hypertrophy with repolarization abnormality ( Slava   product )  Abnormal ECG     Confirmed by Manuel Cole M.D. ([id number]) on 11/18/2021 8:37:07 AM  Also confirmed by Albert Verma ([id number])  on 11/18/2021 8:43:03 AM     NT-PRO BNP    Collection Time: 11/17/21 11:26 PM   Result Value Ref Range    NT pro- (H) <450 PG/ML   TROPONIN-HIGH SENSITIVITY    Collection Time: 11/17/21 11:26 PM   Result Value Ref Range    Troponin-High Sensitivity 42 0 - 51 ng/L   HGB & HCT    Collection Time: 11/17/21 11:26 PM   Result Value Ref Range    HGB 9.3 (L) 11.5 - 16.0 g/dL    HCT 30.7 (L) 35.0 - 47.0 %   TROPONIN-HIGH SENSITIVITY    Collection Time: 11/18/21  3:29 AM   Result Value Ref Range    Troponin-High Sensitivity 74 (HH) 0 - 51 ng/L   CBC WITH AUTOMATED DIFF    Collection Time: 11/18/21  6:53 AM   Result Value Ref Range    WBC 8.7 3.6 - 11.0 K/uL    RBC 3.66 (L) 3.80 - 5.20 M/uL    HGB 9.5 (L) 11.5 - 16.0 g/dL    HCT 31.3 (L) 35.0 - 47.0 %    MCV 85.5 80.0 - 99.0 FL    MCH 26.0 26.0 - 34.0 PG    MCHC 30.4 30.0 - 36.5 g/dL    RDW 16.4 (H) 11.5 - 14.5 %    PLATELET 461 322 - 608 K/uL    MPV 9.7 8.9 - 12.9 FL    NRBC 0.0 0  WBC    ABSOLUTE NRBC 0.00 0.00 - 0.01 K/uL    NEUTROPHILS 73 32 - 75 %    LYMPHOCYTES 15 12 - 49 %    MONOCYTES 10 5 - 13 %    EOSINOPHILS 1 0 - 7 %    BASOPHILS 0 0 - 1 %    IMMATURE GRANULOCYTES 1 (H) 0.0 - 0.5 %    ABS. NEUTROPHILS 6.3 1.8 - 8.0 K/UL    ABS. LYMPHOCYTES 1.3 0.8 - 3.5 K/UL    ABS. MONOCYTES 0.9 0.0 - 1.0 K/UL    ABS. EOSINOPHILS 0.1 0.0 - 0.4 K/UL    ABS. BASOPHILS 0.0 0.0 - 0.1 K/UL    ABS. IMM. GRANS. 0.1 (H) 0.00 - 0.04 K/UL    DF AUTOMATED     TROPONIN-HIGH SENSITIVITY    Collection Time: 11/18/21  6:53 AM   Result Value Ref Range    Troponin-High Sensitivity 85 (HH) 0 - 51 ng/L   LABRCNT(wbc:2,hgb:2,hct:2,plt:2,)  Recent Labs     11/17/21  0544 11/16/21  1024    137   K 3.9 3.7    102   CO2 24 27   BUN 19 24*   CREA 0.83 1.01   * 253*   CA 8.9 8.8   LABRCNT(sgot:3,gpt:3,ap:3,tbiL:3,TP:3,ALB:3,GLOB:3,ggt:3,aml:3,amyp:3,lpse:3,hlpse:3)No results for input(s): INR, PTP, APTT, INREXT in the last 72 hours.   Recent Labs     11/16/21  1024   AP 84   TP 6.7   ALB 3.1*   GLOB 3.6   BRIEFLAB(B12,FOL,FOLAT,RBCF)  Lab Results   Component Value Date/Time    Folate 57.5 (H) 11/17/2021 05:44 AM   LABRCNT(CPK:3,CpKMB:3,ckndx:3,troiq:3)No components found for: GLPOCBRIEFLAB(CHOL,CHOLX,CHOLP,CHLST,CHOLV,HDL,HDLC,HDLP,LDL,DLDL,LDLC,DLDLP,TGL,TGLX,TRIGL,TRIGP,CHHD,CHHDX)No results for input(s): PH, PCO2, PO2 in the last 72 hours. LABRCNT(CPK:3,CpKMB:3,ckndx:3,troiq:3)VENUS Sexton  No results for input(s): CPK, CKNDX, TROIQ in the last 72 hours. No lab exists for component: VENUS Duff      Problem List:     Active Problems:    Acute blood loss anemia (11/16/2021)      GI bleed (11/16/2021)        Impression:  Lack of appetite  Weight loss  Anemia with heme positive stools  Healing gastric ulcers  Hiatal hernia  A. Fib with RVR         Plan:  Patient with anemia likely due to gastric ulcers that are healing. Path pending. Hgb continues to improve to 9.5 today from 9.3. Rapid HP neg. Continue on BID PPI on D/c. Recommend O/P f/u in 3-4 weeks and consider repeat EGD in about 6 months to ensure healing. Not taking NSAIDs, continue to avoid. Can consider HP serologies if path is non revealing.        VENUS Gallo    11/18/2021  10:06 AM   [address] [facility], [address] [place] [address]  [phone]  [address] [place] [address] [place]: [phone]

## 2021-11-18 NOTE — PROGRESS NOTES
0700: Bedside shift change report given to LEAH Gallardo (oncoming nurse) by Laney Lomas RN (offgoing nurse). Report included the following information SBAR, Kardex, ED Summary, Procedure Summary and Intake/Output. 1400: patient worked with PT/OT. 1430: Patient went down to nuclear med for resting part of stress test.      1900:   End of Shift Note    Bedside shift change report given to LEAH Johnson (oncoming nurse) by Mamta De Leon RN (offgoing nurse). Report included the following information SBAR, Kardex, ED Summary, Procedure Summary and Intake/Output    Shift worked:  Day     Shift summary and any significant changes:     No other significant changes     Concerns for physician to address:       Zone phone for oncoming shift:          Activity:  Activity Level: Up with Assistance  Number times ambulated in hallways past shift: 0  Number of times OOB to chair past shift: 3    Cardiac:   Cardiac Monitoring: Yes      Cardiac Rhythm: Sinus Rhythm    Access:   Current line(s): PIV     Genitourinary:   Urinary status: voiding    Respiratory:   O2 Device: None (Room air)  Chronic home O2 use?: N/A  Incentive spirometer at bedside: YES     GI:     Current diet:  ADULT ORAL NUTRITION SUPPLEMENT Breakfast, Lunch, Dinner; Standard High Calorie/High Protein  DIET ONE TIME MESSAGE  ADULT DIET Regular  DIET NPO Sips of Water with Meds  Passing flatus: YES  Tolerating current diet: YES       Pain Management:   Patient states pain is manageable on current regimen: N/A    Skin:  Homero Score: 17  Interventions: increase time out of bed    Patient Safety:  Fall Score:  Total Score: 4  Interventions: bed/chair alarm, assistive device (walker, cane, etc), gripper socks and pt to call before getting OOB  High Fall Risk: Yes    Length of Stay:  Expected LOS: 3d 0h  Actual LOS: 2      Mamta De Leon RN

## 2021-11-18 NOTE — PROGRESS NOTES
11/18/21 0751   Critical Result Types   Type of Critical Result Laboratory   Critical Lab Result Types   Critical Lab Value Troponin   Troponin Value 85   Notification Information   Notified By (Name) Hayes Kennedy   Time of Critical Result Notification 1577   Verbal Readback Provided Yes   Provider Notification   Was Provider Notified Yes   [facility]   Provider [address] Yes   Time Provider Notified 1179   Date Provider Notified 11/18/21   Were Orders Received No     MD said he will review pt's chart/labs.

## 2021-11-19 NOTE — ROUTINE PROCESS
Report received from [facility]. SBAR were discussed.     Zeina Roe RN

## 2021-11-19 NOTE — ROUTINE PROCESS
DISCHARGE SUMMARY FROM [facility] NURSE    The patient is stable for discharge. I have reviewed the discharge instructions with the patient and caregiver. The patient and caregiver verbalized understanding. All questions were fully answered. The patient and caregiver verbalized no complaints. Hard scripts and medication handouts were given and reviewed with the patient and caregiver. Appropriate educational materials and medication side effects teaching were provided also provided. Cardiac monitor and IV line(s) were removed. The following personal items collected during admission were returned to the patient/family  Home medications: none   Dental Appliance: Dental Appliances: None  Vision: Visual Aid: Glasses, At home  Hearing Aid:    Jewelry: Jewelry: Other (comment) (fall alert arm band is in her purse)  Clothing: Clothing: Shirt, Undergarments, Socks, Footwear, Pants, Jacket/Coat  Other Valuables: Other Valuables: Purse  Valuables sent to safe: There were no personal belongings, valuables or home medications left at patient's bedside,  or safe. Patient has her purse. Daughter is taking her to her home and has information regarding meds and follow up. She will wait to hear from [address]

## 2021-11-19 NOTE — PROGRESS NOTES
Cardiology Progress Note      11/19/2021 10:26 AM    Admit Date: 11/16/2021    Admit Diagnosis: GI bleed [K92.2]; Acute blood loss anemia [D62]      Subjective:     Zack Lafleur no chest pain. Upset that she has to do second part of the test.    Visit Vitals  BP (!) 133/46 (BP 1 Location: Left upper arm, BP Patient Position: Sitting)   Pulse 61   Temp 97.5 °F (36.4 °C)   Resp 18   Ht 5' 1\" (1.549 m)   Wt 134 lb 12.8 oz (61.1 kg)   SpO2 96%   BMI 25.47 kg/m²       Current Facility-Administered Medications   Medication Dose Route Frequency    metoprolol tartrate (LOPRESSOR) tablet 12.5 mg  12.5 mg Oral BID    0.9% sodium chloride infusion  25 mL/hr IntraVENous CONTINUOUS    pantoprazole (PROTONIX) tablet 40 mg  40 mg Oral ACB&D    hydrALAZINE (APRESOLINE) 20 mg/mL injection 10 mg  10 mg IntraVENous Q6H PRN    acetaminophen (TYLENOL) tablet 650 mg  650 mg Oral Q6H PRN    0.9% sodium chloride infusion 250 mL  250 mL IntraVENous PRN    pravastatin (PRAVACHOL) tablet 40 mg  40 mg Oral QHS    sodium chloride (NS) flush 5-40 mL  5-40 mL IntraVENous Q8H    sodium chloride (NS) flush 5-40 mL  5-40 mL IntraVENous PRN    ondansetron (ZOFRAN) injection 4 mg  4 mg IntraVENous Q6H PRN    losartan (COZAAR) tablet 100 mg  100 mg Oral DAILY         Objective:      Physical Exam:  Visit Vitals  BP (!) 133/46 (BP 1 Location: Left upper arm, BP Patient Position: Sitting)   Pulse 61   Temp 97.5 °F (36.4 °C)   Resp 18   Ht 5' 1\" (1.549 m)   Wt 134 lb 12.8 oz (61.1 kg)   SpO2 96%   BMI 25.47 kg/m²     General Appearance:  Well developed, well nourished,alert and oriented x 3, and individual in no acute distress. Ears/Nose/Mouth/Throat:   Hearing grossly normal.         Neck: Supple. Chest:   Lungs clear to auscultation bilaterally. Cardiovascular:  Regular rate and rhythm, S1, S2 normal, no murmur. Abdomen:   Soft, non-tender, bowel sounds are active. Extremities: No edema bilaterally. Skin: Warm and dry. Data Review:   Labs:    Recent Results (from the past 24 hour(s))   ECHO ADULT COMPLETE    Collection Time: 11/18/21  4:25 PM   Result Value Ref Range    IVSd 1.64 (A) 0.6 - 0.9 cm    IVSs 2.38 cm    LVIDd 2.71 (A) 3.9 - 5.3 cm    LVIDs 1.86 cm    LVOT d 2.20 cm    LVPWd 2.00 (A) 0.6 - 0.9 cm    LVPWs 2.14 cm    LVOT Peak Gradient 6.00 mmHg    LVOT Peak Gradient 5.83 mmHg    Left Ventricular Outflow Tract Mean Gradient 3.94 mmHg    LVOT SV 87.9 mL    LVOT Peak Velocity 120.72 cm/s    LVOT Peak Velocity 122.48 cm/s    LVOT VTI 23.16 cm    RVIDd 3.59 cm    Left Atrium Major Axis 4.42 cm    Aortic Valve Area by Continuity of Peak Velocity 2.14 cm2    Aortic Valve Area by Continuity of Peak Velocity 2.17 cm2    Aortic Valve Area by Continuity of Peak Velocity 2.17 cm2    Aortic Valve Area by Continuity of Peak Velocity 2.14 cm2    Aortic Valve Area by Continuity of VTI 2.20 cm2    AoV PG 18.39 mmHg    AoV PG 18.39 mmHg    Aortic Valve Systolic Mean Gradient 35.75 mmHg    Aortic Valve Systolic Peak Velocity 212.47 cm/s    Aortic Valve Systolic Peak Velocity 865.36 cm/s    AoV VTI 39.97 cm    MV A Manuel 82.41 cm/s    Mitral Valve E Wave Deceleration Time 311.49 ms    MV E Manuel 86.21 cm/s    E/E' ratio (averaged) 16.67     E/E' lateral 13.35     E/E' septal 20.00     LV E' Lateral Velocity 6.46 cm/s    LV E' Septal Velocity 4.31 cm/s    Pulmonic Valve Systolic Peak Instantaneous Gradient 4.88 mmHg    Pulmonic Valve Max Velocity 110.45 cm/s    Triscuspid Valve Regurgitation Peak Gradient 23.20 mmHg    TR Max Velocity 240.69 cm/s    Ao Root D 3.11 cm    MV E/A 1.05     LV Mass .1 67 - 162 g    LV Mass AL Index 124.7 43 - 95 g/m2    Left Atrium Minor Axis 2.80 cm    MIKI/BSA VTI 1.4 cm2/m2   CBC WITH AUTOMATED DIFF    Collection Time: 11/19/21  4:10 AM   Result Value Ref Range    WBC 7.2 3.6 - 11.0 K/uL    RBC 3.26 (L) 3.80 - 5.20 M/uL    HGB 8.5 (L) 11.5 - 16.0 g/dL    HCT 27.6 (L) 35.0 - 47.0 %    MCV 84.7 80.0 - 99.0 FL    MCH 26.1 26.0 - 34.0 PG    MCHC 30.8 30.0 - 36.5 g/dL    RDW 16.6 (H) 11.5 - 14.5 %    PLATELET 635 630 - 423 K/uL    NRBC 0.0 0  WBC    ABSOLUTE NRBC 0.00 0.00 - 0.01 K/uL    NEUTROPHILS 69 32 - 75 %    LYMPHOCYTES 15 12 - 49 %    MONOCYTES 12 5 - 13 %    EOSINOPHILS 3 0 - 7 %    BASOPHILS 0 0 - 1 %    IMMATURE GRANULOCYTES 0 0.0 - 0.5 %    ABS. NEUTROPHILS 5.0 1.8 - 8.0 K/UL    ABS. LYMPHOCYTES 1.1 0.8 - 3.5 K/UL    ABS. MONOCYTES 0.9 0.0 - 1.0 K/UL    ABS. EOSINOPHILS 0.2 0.0 - 0.4 K/UL    ABS. BASOPHILS 0.0 0.0 - 0.1 K/UL    ABS. IMM. GRANS. 0.0 0.00 - 0.04 K/UL    DF AUTOMATED     NUCLEAR CARDIAC STRESS TEST    Collection Time: 11/19/21 10:02 AM   Result Value Ref Range    Target  bpm       Telemetry: normal sinus rhythm      Assessment:     Hospital Problems  Date Reviewed: 11/17/2021          Codes Class Noted POA    Paroxysmal atrial fibrillation ([facility] [address] ICD-10-CM: I48.0  ICD-9-CM: 427.31  11/18/2021 Unknown        Elevated troponin ICD-10-CM: R77.8  ICD-9-CM: 790.6  11/18/2021 Unknown        Acute blood loss anemia ICD-10-CM: D62  ICD-9-CM: 285.1  11/16/2021 Unknown        GI bleed ICD-10-CM: K92.2  ICD-9-CM: 578.9  11/16/2021 Unknown        Arteriosclerotic coronary artery disease ICD-10-CM: I25.10  ICD-9-CM: 414.00  7/27/2017 Yes              Plan:     afib- paroxysmal. Maintaining sinus rhythm. Continue low dose beta blocker. Elevated troponin- patient refused to complete stress portion of the test. Echo shows normal function. Discussed the need for completing stress test to assess her risk. She will discuss with daughter. Can discharge and f/u as needed if she continues to refuse. Discussed with RN, nuclear lab.     Colby Russell MD

## 2021-11-19 NOTE — PROGRESS NOTES
Nuclear Medicine -  Pt. Refused to complete MPI - NO STRESS study. Resting part completed and results pending.    Meryle Otter was notify and floor nurse

## 2021-11-19 NOTE — DISCHARGE INSTRUCTIONS
HOSPITALIST DISCHARGE INSTRUCTIONS    NAME: Mariana Barrientos   :  1932   MRN:  [id number]     Date/Time:  2021 11:24 AM    ADMIT DATE: 2021   DISCHARGE DATE: 2021     Attending Physician: Blanca Golden MD    DISCHARGE DIAGNOSIS:  Symptomatic acute blood loss anemia  Iron deficiency anemia  GI bleed  Alzheimer disease  Hypertension  Afib with RVR  Elevated troponin    MEDICATIONS:  See above    · It is important that you take the medication exactly as they are prescribed. · Keep your medication in the bottles provided by the pharmacist and keep a list of the medication names, dosages, and times to be taken in your wallet. · Do not take other medications without consulting your doctor. Pain Management: per above medications    What to do at [address]:  Cardiac Diet    Recommended activity: Activity as tolerated    If you have questions regarding the hospital related prescriptions or hospital related issues please call [facility] at . You can always direct your questions to your primary care doctor if you are unable to reach your hospital physician; your PCP works as an extension of your hospital doctor just like your hospital doctor is an extension of your PCP for your time at [address]. If you experience any of the following symptoms then please call your primary care physician or return to the emergency room if you cannot get hold of your doctor:  Fever, chills, nausea, vomiting, diarrhea, change in mentation, falling, bleeding, shortness of breath    Additional Instructions:  Please schedule CBC checked in 1 week after discharge from the hospital.    Bring these papers with you to your follow up appointments.  The papers will help your doctors be sure to continue the care plan from the hospital.              Information obtained by :  I understand that if any problems occur once I am at home I am to contact my physician. I understand and acknowledge receipt of the instructions indicated above.                                                                                                                                            Physician's or R.N.'s Signature                                                                  Date/Time                                                                                                                                              Patient or Representative Signature                                                          Da

## 2021-11-19 NOTE — PROGRESS NOTES
Problem: Upper and Lower GI Bleed:  Discharge Outcomes  Goal: *Hemodynamically stable  Outcome: Progressing Towards Goal  Goal: *Lungs clear or at baseline  Outcome: Progressing Towards Goal  Goal: *Demonstrates independent activity or return to baseline  Outcome: Progressing Towards Goal  Goal: *Pain is controlled to three or less  Outcome: Progressing Towards Goal  Goal: *Verbalizes understanding and describes prescribed diet  Outcome: Progressing Towards Goal  Goal: *Tolerating diet  Outcome: Progressing Towards Goal  Goal: *Anxiety reduced or absent  Outcome: Progressing Towards Goal  Goal: *Understands and describes signs and symptoms to report to providers(Stroke Metric)  Outcome: Progressing Towards Goal     Problem: Falls - Risk of  Goal: *Absence of Falls  Description: Document Willard Fall Risk and appropriate interventions in the flowsheet. Outcome: Progressing Towards Goal  Note: Fall Risk Interventions:  Mobility Interventions: Bed/chair exit alarm, Patient to call before getting OOB    Mentation Interventions: Adequate sleep, hydration, pain control, Bed/chair exit alarm, Door open when patient unattended, More frequent rounding, Reorient patient, Room close to nurse's station    Medication Interventions: Bed/chair exit alarm, Patient to call before getting OOB, Teach patient to arise slowly    Elimination Interventions: Bed/chair exit alarm, Call light in reach, Patient to call for help with toileting needs    History of Falls Interventions: Bed/chair exit alarm, Room close to nurse's station    Problem: Pressure Injury - Risk of  Goal: *Prevention of pressure injury  Description: Document Homero Scale and appropriate interventions in the flowsheet.   Outcome: Progressing Towards Goal  Note: Pressure Injury Interventions:  Sensory Interventions: Assess changes in LOC, Float heels, Minimize linen layers, Monitor skin under medical devices, Pad between skin to skin    Moisture Interventions: Absorbent underpads, Check for incontinence Q2 hours and as needed, Offer toileting Q_hr    Activity Interventions: Increase time out of bed, PT/OT evaluation    Mobility Interventions: Assess need for specialty bed, Float heels, Turn and reposition approx.  every two hours(pillow and wedges)    Nutrition Interventions: Document food/fluid/supplement intake    Friction and Shear Interventions: Lift sheet, Minimize layers

## 2021-11-19 NOTE — PROGRESS NOTES
Pharmacist Discharge Medication Reconciliation    Significant PMH:   Past Medical History:   Diagnosis Date    AK (actinic keratosis) 7/27/2017    Arteriosclerotic coronary artery disease 7/27/2017    Arthritis of right knee 7/27/2017    Back pain 7/27/2017    Carotid atherosclerosis 7/27/2017    Cough 7/27/2017    Fatigue 7/27/2017    History of pneumonia 7/27/2017    HTN (hypertension) 7/27/2017    Hyperlipidemia 7/27/2017    Hypertension     [facility]'s [facility] 8/30/2021    Memory loss 2/19/2021    Need for Streptococcus pneumoniae vaccination 7/27/2017    On statin therapy 7/27/2017    Plantar fasciitis 7/27/2017    Pneumonia     Shoulder sprain 7/27/2017    Syncope 7/27/2017     Encounter Diagnoses:   Encounter Diagnosis   Name Primary? Gastrointestinal hemorrhage, unspecified gastrointestinal hemorrhage type Yes     Allergies: Patient has no known allergies. Discharge Medications:   Current Discharge Medication List        START taking these medications    Details   losartan (COZAAR) 100 mg tablet Take 1 Tablet by mouth daily for 30 days. Qty: 30 Tablet, Refills: 0  Start date: 11/20/2021, End date: 12/20/2021      metoprolol tartrate (LOPRESSOR) 25 mg tablet Take 0.5 Tablets by mouth two (2) times a day for 30 days. Qty: 30 Tablet, Refills: 0  Start date: 11/19/2021, End date: 12/19/2021      pantoprazole (PROTONIX) 40 mg tablet Take 1 Tablet by mouth Before breakfast and dinner for 30 days. Indications: bleeding from stomach, esophagus or duodenum  Qty: 60 Tablet, Refills: 0  Start date: 11/19/2021, End date: 12/19/2021      ferrous sulfate 325 mg (65 mg iron) tablet Take 1 Tablet by mouth Daily (before breakfast) for 30 days.   Qty: 30 Tablet, Refills: 0  Start date: 11/19/2021, End date: 12/19/2021           CONTINUE these medications which have NOT CHANGED    Details   pravastatin (PRAVACHOL) 40 mg tablet TAKE 1 TABLET BY MOUTH EVERY NIGHT  Qty: 90 Tab, Refills: 3      multivitamins-minerals-lutein (CENTRUM SILVER) Tab Take 1 Tab by mouth daily. STOP taking these medications       valsartan-hydroCHLOROthiazide (DIOVAN-HCT) 160-12.5 mg per tablet Comments:   Reason for Stopping:         aspirin delayed-release 81 mg tablet Comments:   Reason for Stopping:               The patient's chart, MAR and AVS were reviewed by Sanya Robledo [facility].     Discharging Provider: Lonny Rodriguez MD    Thank you,     Sanya Robledo, [facility]

## 2021-11-19 NOTE — PROGRESS NOTES
Transition of Care Plan:     RUR: 14 %, LOW risk  Disposition: Home with [facility] [address]  Follow up appointments: PCP and specialists as indicated. DME needed: None  Transportation at [address] [place] [address] Dr or means to access home:  Yes      IM Medicare Letter: given 11/19/21  Is patient a BCPI-A Bundle:    Bundle letter will be distributed by unit CM if pt meets bundle criteria.                 If yes, was Bundle Letter given?: [address] [place] [address] [phone]  Discharge Caregiver contacted prior to discharge?   daughter at bedside    Update 1300:  CM received a call from [address] at [facility] confirming that Ángela Friends has been sent to [facility]. CM discussed d/c and f/u appt with pt and daughter and they are agreeable. Pt is ready for d/c from a CM standpoint. Initial note:  Chart reviewed. D/C acknowledged. CM met with pt and daughter to discuss [facility] options. They chose [facility], [facility], and [facility]. After speaking with Mary at [facility] re: Ángela Boles, referral sent to [facility]. Awaiting response. Medicare pt has received, reviewed, and signed 2nd IM letter informing them of their right to appeal the discharge. Signed copy has been placed on pt bedside chart. Daughter at bedside for d/c. CM will continue to monitor for d/c needs. Care Management Interventions  PCP Verified by CM: Yes  Last Visit to PCP: 11/15/21  Palliative Care Criteria Met (RRAT>21 & CHF Dx)?: No  Mode of Transport at Discharge:  Other (see comment) (daughter at bedside for d/c)  Transition of Care Consult (CM Consult): Discharge Planning  Discharge Durable Medical Equipment: No  Physical Therapy Consult: Yes  Occupational Therapy Consult: Yes  Speech Therapy Consult: No  Support Systems: Child(tony)  Confirm Follow Up Transport: Family  The Patient and/or Patient Representative was Provided with a Choice of Provider and Agrees with the Discharge Plan?: Yes  Freedom of Choice List was Provided with Basic Dialogue that Supports the Patient's Individualized Plan of Care/Goals, Treatment Preferences and Shares the Quality Data Associated with the Providers?: Yes   Resource Information Provided?: No  Discharge Location  Discharge Placement: Home with home health     RAFY Ivory  Care Manager  [phone]

## 2021-11-19 NOTE — DISCHARGE SUMMARY
Hospitalist Discharge Summary     Patient ID:  Erin Alvares  [id number]  12 y.o.  5/17/1932 11/16/2021    PCP on record: Rosalio Richardson MD    Admit date: 11/16/2021  Discharge date and time: 11/19/2021    DISCHARGE DIAGNOSIS:    Symptomatic acute blood loss anemia  Iron deficiency anemia  GI bleed  Alzheimer disease  Hypertension  Afib with RVR  Elevated troponin    CONSULTATIONS:  IP CONSULT TO GASTROENTEROLOGY  IP CONSULT TO CARDIOLOGY    Excerpted HPI from H&P of Tania Wolf MD:  Kiersten Tompkins is a 80 y.o.  female with past medical history of hypertension, hyperlipidemia and Alzheimer disease who was sent to the ED by PCP for ongoing shortness of breath for few months  with generalized weakness. In the ED, vital signs were stable. Her labs revealed hemoglobin of 7.2. BMP is unremarkable  Review of system limited as patient has a dementia. Most of the HPI obtained from the daughter who was at bedside    ______________________________________________________________________  DISCHARGE SUMMARY/HOSPITAL COURSE:  for full details see H&P, daily progress notes, labs, consult notes. Symptomatic acute blood loss anemia  Iron deficiency anemia  GI bleed  Alzheimer disease  Hypertension  -Hemoglobin  able on discharge 8.5, got 1 unit during the hospitalization.  -Discharged on ferrous sulfate.  -Protonix twice daily. -GI on board-did EGD yesterday.   -EGD findings-gastric erosions, small hiatal hernia.  -Continue the home medication for the blood pressure.     A. fib with RVR  Elevated troponin  -Currently in sinus rhythm, was there for a brief time.  -Discontinue the amiodarone drip.  -Started on low-dose beta-blocker by cardiology.  -Cardiology on board-appreciate recommendations.  -Echo-EF 55 to 13%, normal diastolic function, severely dilated left atrium.  -Troponin elevated but trended flat likely secondary to demand ischemia.  -Patient declined stress test today, wants to go home. Will get outpatient stress test.  Patient no chest pain, no shortness of breath, able to ambulate without any difficulty. .   ___________________________________________________________________  Patient seen and examined by me on discharge day. Pertinent Findings:  Gen:    Not in distress  Chest: Clear lungs  CVS:   Regular rhythm. No edema  Abd:  Soft, not distended, not tender  Neuro:  Alert,   _______________________________________________________________________  DISCHARGE MEDICATIONS:   Current Discharge Medication List      START taking these medications    Details   losartan (COZAAR) 100 mg tablet Take 1 Tablet by mouth daily for 30 days. Qty: 30 Tablet, Refills: 0  Start date: 11/20/2021, End date: 12/20/2021      metoprolol tartrate (LOPRESSOR) 25 mg tablet Take 0.5 Tablets by mouth two (2) times a day for 30 days. Qty: 30 Tablet, Refills: 0  Start date: 11/19/2021, End date: 12/19/2021      pantoprazole (PROTONIX) 40 mg tablet Take 1 Tablet by mouth Before breakfast and dinner for 30 days. Indications: bleeding from stomach, esophagus or duodenum  Qty: 60 Tablet, Refills: 0  Start date: 11/19/2021, End date: 12/19/2021         CONTINUE these medications which have NOT CHANGED    Details   pravastatin (PRAVACHOL) 40 mg tablet TAKE 1 TABLET BY MOUTH EVERY NIGHT  Qty: 90 Tab, Refills: 3      multivitamins-minerals-lutein (CENTRUM SILVER) Tab Take 1 Tab by mouth daily. STOP taking these medications       valsartan-hydroCHLOROthiazide (DIOVAN-HCT) 160-12.5 mg per tablet Comments:   Reason for Stopping:         aspirin delayed-release 81 mg tablet Comments:   Reason for Stopping:                 Patient Follow Up Instructions:    Activity: Activity as tolerated  Diet: Cardiac Diet  Wound Care: None needed    Please schedule CBC checked in 1 week after discharge from the hospital.    If you have questions regarding the hospital related prescriptions or hospital related issues please call [id number] [facility] at . You can always direct your questions to your primary care doctor if you are unable to reach your hospital physician; your PCP works as an extension of your hospital doctor just like your hospital doctor is an extension of your PCP for your [facility] [address]. If you experience any of the following symptoms then please call your primary care physician or return to the emergency room if you cannot get hold of your doctor:  Fever, chills, nausea, vomiting, diarrhea, change in mentation, falling, bleeding, shortness of breath    Follow-up Information     Follow up With Specialties Details Why Hansel Samuels MD Internal Medicine On 11/24/2021 Pleas go to your appointment at 3:30pm.  Call the office for any questions.  [facility]  [phone]      Mo Pacheco MD Cardiology, [address] [place] [address] Vascular Surgery, Internal Medicine In 2 weeks  [address] [phone]          ________________________________________________________________    Risk of deterioration: Low    Condition at Discharge:  Stable  __________________________________________________________________    Disposition  Home with family and home health services    ____________________________________________________________________    Code Status: DNR/DNI  ___________________________________________________________________      Total time in minutes spent coordinating this discharge (includes going over instructions, follow-up, prescriptions, and preparing report for sign off to her PCP) :  >30 minutes    Signed:  Marilin Guzmán MD

## 2021-11-22 NOTE — Clinical Note
Please see patient outreach dated 11-22-21. Unable to reach patient by phone for transitions of care follow-up call, letter sent to patient requesting call back.

## 2021-11-22 NOTE — PROGRESS NOTES
21 at 11:33am:  Care Transitions Outreach Attempt    Call within 2 business days of discharge: Yes   Attempted to reach patient for transitions of care follow up. Unable to reach patient. Patient: Erin Alvares Patient : 1932 MRN: [id number]    Last Discharge [address]       Complaint Diagnosis Description Type Department Provider    21 Abnormal Lab Results Gastrointestinal hemorrhage, unspecified gastrointestinal hemorrhage type ED to Hosp-Admission (Discharged) (ADMIT) Edda Burrows MD; Wilfrido. .. Was this an external facility discharge? No     Noted following upcoming appointments from discharge chart review:   [facility] follow up appointment(s):   Future Appointments   Date Time Provider Amadou Scott   2021  3:30 PM Rosalio Richardson MD PCACARMITA BS AMB   3/4/2022 10:45 AM MD JANE HameedM BS AMB     Non-[facility] follow up appointment(s): None noted at this time. 21 at 10:17am:  Care Transitions Nurse (CTN) made second attempt to reach patient for transitions of care follow-up call. Unable to reach patient. 'Unable to Reach' by phone letter sent to patient. CTN will continue to monitor.

## 2021-11-22 NOTE — LETTER
11/23/2021 10:25 AM    Ms. Perez Katie [address] [id number]    Dear Mariana Barrientos:    My name is Ha Curiel and I am a Care Transition Nurse who partners with your provider to improve patients' health. I've been trying to reach you via phone to let you know that, as a member of your care team, I will work with other providers involved in your care, offer education for your specific health conditions, and connect you with more resources as needed. This program is designed to provide you with the opportunity to have a ([id number] [facility]/[address]) care manager partner with you for the following situations:     1) if you come home from the hospital or emergency room   2) to help manage your disease   3) when you would like assistance coordinating services or appointments    This added support is provided at no additional cost to you. My primary focus is to help you achieve specific goals and improve your health. Please call me at [phone] to further discuss how I can support your health care needs.     Sincerely,   Ha Curiel RN

## 2021-11-29 NOTE — ADT AUTH CERT NOTES
URGENT REQUEST:       [facility]#[id number]  PLEASE SEND FAX OR CALL WITH UPDATE OF DAYS APPROVED DC  SUMMARY HAS BEEN SUBMITTED ON 11/21 & 11/29      [facility] Pat Medeiros     [phone] FAX  [phone] PHONE

## 2021-12-06 NOTE — PROGRESS NOTES
Dipti Moser is a 80 y.o. female and presents with Transitions Of Care [facility], [facility] 11/16/21-11/19/21 for acute blood loss anemia)  . Subjective:  Mrs. Neah Miller presents today for follow-up after being hospitalized at [facility] from November 16 to November 19. She was hospitalized for anemia and was found to have gastric erosions by endoscopy. Post endoscopy she developed atrial fibrillation. This was transient and she had a mildly elevated troponin which was thought to be demand ischemia. She was placed on low-dose metoprolol and losartan. She had previously been on valsartan HCT. She is now on pantoprazole 40 mg twice daily for 1 month. She is taking iron and because of her anemia had been transfused 1 unit of packed red blood cells. She is also on pravastatin 40 mg daily. She has no shortness of breath and in fact her shortness of breath has resolved and she is back to normal activities with good exercise tolerance. She unfortunately was not able to complete her stress test but was anxious to get out of the hospital and declines to have further testing done at this time.     Past Medical History:   Diagnosis Date    AK (actinic keratosis) 7/27/2017    Arteriosclerotic coronary artery disease 7/27/2017    Arthritis of right knee 7/27/2017    Back pain 7/27/2017    Carotid atherosclerosis 7/27/2017    Cough 7/27/2017    Fatigue 7/27/2017    History of pneumonia 7/27/2017    HTN (hypertension) 7/27/2017    Hyperlipidemia 7/27/2017    Hypertension     Late onset Alzheimer's dementia without behavioral disturbance ([facility] [address] 8/30/2021    Memory loss 2/19/2021    Need for Streptococcus pneumoniae vaccination 7/27/2017    On statin therapy 7/27/2017    Plantar fasciitis 7/27/2017    Pneumonia     Shoulder sprain 7/27/2017    Syncope 7/27/2017     Past Surgical History:   Procedure Laterality Date    HX GI      ruptured intestine    UPPER GI ENDOSCOPY,BIOPSY  11/17/2021          No Known Allergies  Current Outpatient Medications   Medication Sig Dispense Refill    losartan (COZAAR) 100 mg tablet Take 1 Tablet by mouth daily for 30 days. 30 Tablet 0    metoprolol tartrate (LOPRESSOR) 25 mg tablet Take 0.5 Tablets by mouth two (2) times a day for 30 days. 30 Tablet 0    pantoprazole (PROTONIX) 40 mg tablet Take 1 Tablet by mouth Before breakfast and dinner for 30 days. Indications: bleeding from stomach, esophagus or duodenum 60 Tablet 0    ferrous sulfate 325 mg (65 mg iron) tablet Take 1 Tablet by mouth Daily (before breakfast) for 30 days. 30 Tablet 0    pravastatin (PRAVACHOL) 40 mg tablet TAKE 1 TABLET BY MOUTH EVERY NIGHT 90 Tab 3    multivitamins-minerals-lutein (CENTRUM SILVER) Tab Take 1 Tab by mouth daily. Social History     Socioeconomic History    Marital status: SINGLE   Tobacco Use    Smoking status: Never Smoker    Smokeless tobacco: Never Used   Vaping Use    Vaping Use: Never used   Substance and Sexual Activity    Alcohol use: No    Drug use: No    Sexual activity: Never     Family History   Problem Relation Age of Onset    Hypertension Mother     Heart Disease Father        Review of Systems  Constitutional:  negative for fevers, chills, anorexia and weight loss  Eyes:    negative for visual disturbance and irritation  ENT:    negative for tinnitus,sore throat,nasal congestion,ear pains. hoarseness  Respiratory:     negative for cough, hemoptysis, dyspnea,wheezing  CV:    negative for chest pain, palpitations, lower extremity edema  GI:    negative for nausea, vomiting, diarrhea, abdominal pain,melena  Endo:               negative for polyuria,polydipsia,polyphagia,heat intolerance  Genitourinary : negative for frequency, dysuria and hematuria  Integumentary: negative for rash and pruritus  Hematologic:   negative for easy bruising and gum/nose bleeding  Musculoskel:  negative for myalgias, arthralgias, back pain, muscle weakness, joint pain  Neurological:   negative for headaches, dizziness, vertigo, memory problems and gait   Behavl/Psych:  negative for feelings of anxiety, depression, mood changes  ROS otherwise negative      Objective:  Visit Vitals  /60 (BP 1 Location: Left upper arm, BP Patient Position: Sitting, BP Cuff Size: Adult)   Pulse (!) 57   Temp 98.6 °F (37 °C) (Oral)   Ht 5' 1\" (1.549 m)   Wt 131 lb 9.6 oz (59.7 kg)   SpO2 98%   BMI 24.87 kg/m²     Physical Exam:   General appearance - alert, well appearing, and in no distress  Mental status - alert, oriented to person, place, and time  EYE-PARESH, EOMI, fundi normal, corneas normal, no foreign bodies  ENT-ENT exam normal, no neck nodes or sinus tenderness  Nose - normal and patent, no erythema, discharge or polyps  Mouth - mucous membranes moist, pharynx normal without lesions  Neck - supple, no significant adenopathy   Chest - clear to auscultation, no wheezes, rales or rhonchi, symmetric air entry   Heart - normal rate, regular rhythm, normal S1, S2, no murmurs, rubs, clicks or gallops   Abdomen - soft, nontender, nondistended, no masses or organomegaly  Lymph- no adenopathy palpable  Ext-peripheral pulses normal, no pedal edema, no clubbing or cyanosis  Skin-Warm and dry. no hyperpigmentation, vitiligo, or suspicious lesions  Neuro -alert, oriented, normal speech, no focal findings or movement disorder noted      Assessment/Plan:  Diagnoses and all orders for this visit:    1. Iron deficiency anemia, unspecified iron deficiency anemia type  -     CBC WITH AUTOMATED DIFF; Future  -     METABOLIC PANEL, BASIC; Future    2. Essential hypertension  -     CBC WITH AUTOMATED DIFF; Future  -     METABOLIC PANEL, BASIC; Future    3. Primary hypertension    4. Gastric erosion, unspecified chronicity          ICD-10-CM ICD-9-CM    1.  Iron deficiency anemia, unspecified iron deficiency anemia type  D50.9 280.9 CBC WITH AUTOMATED DIFF      METABOLIC PANEL, BASIC      METABOLIC PANEL, BASIC      CBC WITH AUTOMATED DIFF   2. Essential hypertension  I10 401.9 CBC WITH AUTOMATED DIFF      METABOLIC PANEL, BASIC      METABOLIC PANEL, BASIC      CBC WITH AUTOMATED DIFF   3. Primary hypertension  I10 401.9    4. Gastric erosion, unspecified chronicity  K25.9 535.40        Plan:    Continue current medical regimen as outlined above. The patient will finish her pantoprazole twice daily for a month and then continue once daily thereafter. GI wanted her to follow-up for a repeat endoscopy but the patient is declining this at this time. We will continue to monitor and have a follow-up hemoglobin as well. Follow-up and Dispositions    · Return in about 3 months (around 3/6/2022) for follow up. I have reviewed with the patient details of the assessment and plan and all questions were answered. Relevent patient education was performed. Verbal and/or written instructions (see AVS) provided. The most recent lab findings were reviewed with the patient. Plan was discussed with patient who verbally expressed understanding. An After Visit Summary was printed and given to the patient.     Shanae Solis MD

## 2021-12-06 NOTE — PROGRESS NOTES
Chief Complaint   Patient presents with   [facility] 11/16/21-11/19/21 for acute blood loss anemia     Visit Vitals  /60 (BP 1 Location: Left upper arm, BP Patient Position: Sitting, BP Cuff Size: Adult)   Pulse (!) 57   Temp 98.6 °F (37 °C) (Oral)   Ht 5' 1\" (1.549 m)   Wt 131 lb 9.6 oz (59.7 kg)   SpO2 98%   BMI 24.87 kg/m²       1. Have you been to the ER, urgent care clinic since your last visit? Hospitalized since your last visit? ED [facility] 11/16/21-11/19/21 for acute blood loss anemia    2. Have you seen or consulted any other health care providers outside of the [address] [place] [address] since your last visit? Include any pap smears or colon screening.  No

## 2021-12-07 NOTE — PROGRESS NOTES
Nonfasting glucose elevated at 195. Hemoglobin stable at 9.9 but remains mildly anemic. Continue current medicines and follow-up as planned.

## 2021-12-13 NOTE — PROGRESS NOTES
Problem: Patient Education: Go to Patient Education Activity  Goal: Patient/Family Education  11/17/2021 0412 by Brandi Richardson, RN  Outcome: Progressing Towards Goal  11/17/2021 0412 by Brandi Richardson, RN  Outcome: Progressing Towards Goal     Problem: Upper and Lower GI Bleed: Day 1  Goal: Off Pathway (Use only if patient is Off Pathway)  11/17/2021 0412 by Brandi Richardson, RN  Outcome: Progressing Towards Goal  11/17/2021 0412 by Brandi Richardson, RN  Outcome: Progressing Towards Goal  Goal: Activity/Safety  11/17/2021 0412 by Brandi Richardson, RN  Outcome: Progressing Towards Goal  11/17/2021 0412 by Brandi Richardson, RN  Outcome: Progressing Towards Goal  Goal: Consults, if ordered  11/17/2021 0412 by Brandi Richardson, RN  Outcome: Progressing Towards Goal  11/17/2021 0412 by Brandi Richardson, RN  Outcome: Progressing Towards Goal  Goal: Diagnostic Test/Procedures  11/17/2021 0412 by Barndi Richardson, RN  Outcome: Progressing Towards Goal  11/17/2021 0412 by Brandi Richardson, RN  Outcome: Progressing Towards Goal  Goal: Nutrition/Diet  11/17/2021 0412 by Brandi Richardson, RN  Outcome: Progressing Towards Goal  11/17/2021 0412 by Brandi Richardson, RN  Outcome: Progressing Towards Goal  Goal: Discharge Planning  11/17/2021 0412 by Brandi Richardson, RN  Outcome: Progressing Towards Goal  11/17/2021 0412 by Brandi Richardson, RN  Outcome: Progressing Towards Goal  Goal: Medications  11/17/2021 0412 by Brandi Richardson, RN  Outcome: Progressing Towards Goal  11/17/2021 0412 by Brandi Richardson, RN  Outcome: Progressing Towards Goal  Goal: Respiratory  11/17/2021 0412 by Brandi Richardson, RN  Outcome: Progressing Towards Goal  11/17/2021 0412 by Brandi Richardson, RN  Outcome: Progressing Towards Goal  Goal: Treatments/Interventions/Procedures  11/17/2021 0412 by Brandi Richardson, RN  Outcome: Progressing Towards Goal  11/17/2021 0412 by Jo Stoner, RN  Outcome: Progressing Towards Goal  Goal: Psychosocial  11/17/2021 0412 by Jo Stoner, RN  Outcome: Progressing Towards Goal  11/17/2021 0412 by Jo Stoner, RN  Outcome: Progressing Towards Goal  Goal: *Optimal pain control at patient's stated goal  11/17/2021 0412 by Jo Stoner, RN  Outcome: Progressing Towards Goal  11/17/2021 0412 by Jo Stoner, RN  Outcome: Progressing Towards Goal  Goal: *Hemodynamically stable  11/17/2021 0412 by Jo Stoner, RN  Outcome: Progressing Towards Goal  11/17/2021 0412 by Jo Stoner, RN  Outcome: Progressing Towards Goal  Goal: *Demonstrates progressive activity  11/17/2021 0412 by Jo Stoner, RN  Outcome: Progressing Towards Goal  11/17/2021 0412 by Jo Stoner, RN  Outcome: Progressing Towards Goal     Problem: Upper and Lower GI Bleed: Day 2  Goal: Off Pathway (Use only if patient is Off Pathway)  11/17/2021 0412 by Jo Stoner, RN  Outcome: Progressing Towards Goal  11/17/2021 0412 by Jo Stoner, RN  Outcome: Progressing Towards Goal  Goal: Activity/Safety  11/17/2021 0412 by Jo Stoner, RN  Outcome: Progressing Towards Goal  11/17/2021 0412 by Jo Stoner, RN  Outcome: Progressing Towards Goal  Goal: Consults, if ordered  11/17/2021 0412 by Jo Stoner, RN  Outcome: Progressing Towards Goal  11/17/2021 0412 by Jo Stoner, RN  Outcome: Progressing Towards Goal  Goal: Diagnostic Test/Procedures  11/17/2021 0412 by Jo Stoner, RN  Outcome: Progressing Towards Goal  11/17/2021 0412 by Jo Stoner, RN  Outcome: Progressing Towards Goal  Goal: Nutrition/Diet  11/17/2021 0412 by Jo Stoner, RN  Outcome: Progressing Towards Goal  11/17/2021 0412 by Jo Stoner, RN  Outcome: Progressing Towards Goal  Goal: Discharge Planning  11/17/2021 0412 by Radha Edge, RN  Outcome: Progressing Towards Goal  11/17/2021 0412 by Radha Edge, RN  Outcome: Progressing Towards Goal  Goal: Medications  11/17/2021 0412 by Radha Edge, RN  Outcome: Progressing Towards Goal  11/17/2021 0412 by Radha Edge, RN  Outcome: Progressing Towards Goal  Goal: Respiratory  11/17/2021 0412 by Radha Edge, RN  Outcome: Progressing Towards Goal  11/17/2021 0412 by Radha Edge, RN  Outcome: Progressing Towards Goal  Goal: Treatments/Interventions/Procedures  11/17/2021 0412 by Radha Edge, RN  Outcome: Progressing Towards Goal  11/17/2021 0412 by Radha Edge, RN  Outcome: Progressing Towards Goal  Goal: Psychosocial  11/17/2021 0412 by Radha Edge, RN  Outcome: Progressing Towards Goal  11/17/2021 0412 by Radha Edge, RN  Outcome: Progressing Towards Goal  Goal: *Optimal pain control at patient's stated goal  11/17/2021 0412 by Radha Edge, RN  Outcome: Progressing Towards Goal  11/17/2021 0412 by Radha Edge, RN  Outcome: Progressing Towards Goal  Goal: *Hemodynamically stable  11/17/2021 0412 by Radha Edge, RN  Outcome: Progressing Towards Goal  11/17/2021 0412 by Radha Edge, RN  Outcome: Progressing Towards Goal  Goal: *Tolerating diet  11/17/2021 0412 by Radha Edge, RN  Outcome: Progressing Towards Goal  11/17/2021 0412 by Radha Edge, RN  Outcome: Progressing Towards Goal  Goal: *Demonstrates progressive activity  11/17/2021 0412 by Radha Edge, RN  Outcome: Progressing Towards Goal  11/17/2021 0412 by Radha Edge, RN  Outcome: Progressing Towards Goal     Problem: Upper and Lower GI Bleed: Day 3  Goal: Off Pathway (Use only if patient is Off Pathway)  11/17/2021 0412 by Radha Edge, Detail Level: Zone RN  Outcome: Progressing Towards Goal  11/17/2021 0412 by Patsy Dalal RN  Outcome: Progressing Towards Goal  Goal: Activity/Safety  11/17/2021 0412 by Patsy Dalal RN  Outcome: Progressing Towards Goal  11/17/2021 0412 by Patsy Dalal RN  Outcome: Progressing Towards Goal  Goal: Diagnostic Test/Procedures  11/17/2021 0412 by Patsy Dalal RN  Outcome: Progressing Towards Goal  11/17/2021 0412 by Patsy Dalal, RN  Outcome: Progressing Towards Goal  Goal: Nutrition/Diet  11/17/2021 0412 by Ptasy Dalal RN  Outcome: Progressing Towards Goal  11/17/2021 0412 by Patsy Dalal RN  Outcome: Progressing Towards Goal  Goal: Discharge Planning  11/17/2021 0412 by Patsy Dalal RN  Outcome: Progressing Towards Goal  11/17/2021 0412 by Patsy Dalal RN  Outcome: Progressing Towards Goal  Goal: Medications  11/17/2021 0412 by Patsy Dalal RN  Outcome: Progressing Towards Goal  11/17/2021 0412 by Patsy Dalal RN  Outcome: Progressing Towards Goal  Goal: Treatments/Interventions/Procedures  11/17/2021 0412 by Patsy Dalal RN  Outcome: Progressing Towards Goal  11/17/2021 0412 by Patsy Dalal RN  Outcome: Progressing Towards Goal  Goal: Psychosocial  11/17/2021 0412 by Patsy Dalal RN  Outcome: Progressing Towards Goal  11/17/2021 0412 by Patsy Dalal RN  Outcome: Progressing Towards Goal     Problem: Upper and Lower GI Bleed:  Discharge Outcomes  Goal: *Hemodynamically stable  11/17/2021 0412 by Patsy Dalal RN  Outcome: Progressing Towards Goal  11/17/2021 0412 by Patsy Dalal RN  Outcome: Progressing Towards Goal  Goal: *Lungs clear or at baseline  11/17/2021 0412 by Patsy Dalal, RN  Outcome: Progressing Towards Goal  11/17/2021 0412 by Patsy Dalal RN  Outcome: Progressing Towards Goal  Goal: *Demonstrates independent activity or return to baseline  11/17/2021 0412 by Milton Gerber RN  Outcome: Progressing Towards Goal  11/17/2021 0412 by Milton Gerber RN  Outcome: Progressing Towards Goal  Goal: *Pain is controlled to three or less  11/17/2021 0412 by Milton Gerber RN  Outcome: Progressing Towards Goal  11/17/2021 0412 by Milton Gerber RN  Outcome: Progressing Towards Goal  Goal: *Verbalizes understanding and describes prescribed diet  11/17/2021 0412 by Milton Gerber RN  Outcome: Progressing Towards Goal  11/17/2021 0412 by Milton Gerber RN  Outcome: Progressing Towards Goal  Goal: *Tolerating diet  11/17/2021 0412 by Milton Gerber RN  Outcome: Progressing Towards Goal  11/17/2021 0412 by Milton Gerber RN  Outcome: Progressing Towards Goal  Goal: *Verbalizes name, dosage, time, side effects, and number of days to continue medications  11/17/2021 0412 by Milton Gerber RN  Outcome: Progressing Towards Goal  11/17/2021 0412 by Milton Gerber RN  Outcome: Progressing Towards Goal  Goal: *Anxiety reduced or absent  11/17/2021 0412 by Milton Gerber RN  Outcome: Progressing Towards Goal  11/17/2021 0412 by Milton Gerber RN  Outcome: Progressing Towards Goal  Goal: *Understands and describes signs and symptoms to report to providers(Stroke Metric)  11/17/2021 0412 by Milton Gerber RN  Outcome: Progressing Towards Goal  11/17/2021 0412 by Milton Gerber RN  Outcome: Progressing Towards Goal  Goal: *Describes follow-up/return visits to physicians  11/17/2021 0412 by Milton Gerber RN  Outcome: Progressing Towards Goal  11/17/2021 0412 by Milton Gerber RN  Outcome: Progressing Towards Goal  Goal: *Describes available resources and support systems  11/17/2021 0412 by Milton Gerber RN  Outcome: Progressing Towards Goal  11/17/2021 0412 by Ragena Goodpasture, Maria Fernanda Cortez RN  Outcome: Progressing Towards Goal     Problem: Falls - Risk of  Goal: *Absence of Falls  Description: Document Fabian Robledo Fall Risk and appropriate interventions in the flowsheet.   11/17/2021 0412 by Kayy Hoffman RN  Outcome: Progressing Towards Goal  Note: Fall Risk Interventions:  Mobility Interventions: Bed/chair exit alarm, Communicate number of staff needed for ambulation/transfer, Patient to call before getting OOB    Mentation Interventions: Adequate sleep, hydration, pain control, Bed/chair exit alarm, Door open when patient unattended, More frequent rounding, Reorient patient, Room close to nurse's station, Toileting rounds, Update white board    Medication Interventions: Bed/chair exit alarm, Patient to call before getting OOB, Teach patient to arise slowly         History of Falls Interventions: Bed/chair exit alarm, Door open when patient unattended, Investigate reason for fall, Room close to nurse's station      11/17/2021 0412 by Kayy Hoffman RN  Outcome: Progressing Towards Goal  Note: Fall Risk Interventions:  Mobility Interventions: Bed/chair exit alarm, Communicate number of staff needed for ambulation/transfer, Patient to call before getting OOB    Mentation Interventions: Adequate sleep, hydration, pain control, Bed/chair exit alarm, Door open when patient unattended, More frequent rounding, Reorient patient, Room close to nurse's station, Toileting rounds, Update white board    Medication Interventions: Bed/chair exit alarm, Patient to call before getting OOB, Teach patient to arise slowly         History of Falls Interventions: Bed/chair exit alarm, Door open when patient unattended, Investigate reason for fall, Room close to nurse's station         Problem: Patient Education: Go to Patient Education Activity  Goal: Patient/Family Education  11/17/2021 0412 by Kayy Hoffman RN  Outcome: Progressing Towards Goal  11/17/2021 0412 by Semaj Medina Grady Kumari RN  Outcome: Progressing Towards Goal     Problem: Pressure Injury - Risk of  Goal: *Prevention of pressure injury  Description: Document Homero Scale and appropriate interventions in the flowsheet.   11/17/2021 0412 by Megan Regan RN  Outcome: Progressing Towards Goal  11/17/2021 0412 by Megan Regan RN  Outcome: Progressing Towards Goal     Problem: Patient Education: Go to Patient Education Activity  Goal: Patient/Family Education  11/17/2021 0412 by Megan Regan RN  Outcome: Progressing Towards Goal  11/17/2021 0412 by Megan Regan RN  Outcome: Progressing Towards Goal Other (Free Text): Patient to remove suture at home Render Risk Assessment In Note?: no Note Text (......Xxx Chief Complaint.): This diagnosis correlates with the

## 2021-12-24 NOTE — PROGRESS NOTES
Spiritual Care Assessment/Progress Note  [facility]      NAME: Yates Lesch      MRN: [id number]  AGE: 80 y.o. SEX: female  [employer] Affiliation: Church   Language: English     12/24/2021     Total Time (in minutes): 17     Spiritual Assessment begun in [facility] EMERGENCY DEPT through conversation with:         []Patient        [x] Family    [] Friend(s)        Reason for Consult: Emergency Department visit,End-of-life support,Death, ER     Spiritual beliefs: (Please include comment if needed)     [x] Identifies with a tonny tradition: Nickie        [x] Supported by a tonny community:[place] [address]            [] Claims no spiritual orientation:           [] Seeking spiritual identity:                [] Adheres to an individual form of spirituality:           [] Not able to assess:                           Identified resources for coping:      [] Prayer                               [] Music                  [] Guided Imagery     [x] Family/friends                 [] Pet visits     [] Devotional reading                         [] Unknown     [] Other:                                               Interventions offered during this visit: (See comments for more details)          Family/Friend(s):  Affirmation of emotions/emotional suffering     Plan of Care:     [] Support spiritual and/or cultural needs    [] Support AMD and/or advance care planning process      [x] Support grieving process   [] Coordinate Rites and/or Rituals    [] Coordination with community clergy   [] No spiritual needs identified at this time   [] Detailed Plan of Care below (See Comments)  [] Make referral to Music Therapy  [] Make referral to Pet Therapy     [] Make referral to Addiction services  [] Make referral to [facility]  [] Make referral to Spiritual Care Partner  [] No future visits requested        [] Contact Spiritual Care for further referrals     Comments:      responded to EOL support request for Mrs. Christiane Law in [facility] [address]. Pt's daughter and son in law had arrived, they asked medical staff to cease all intervention as DNR. While she passes away, , Dr. Mikaela Ceja and children were supportively present during pt's peaceful transition. According to her, they attend [facility], have strong spiritual support from their L[employer] day like prayer chain. She confess that they are strong believer and her mother is in better place with eternal peace,  affirmed her confession. They informed this sad news to their , he will be here to support them.  shared condolence with them, advised of grief support availability.     [id number] [facility] CARMITA Sawyer.Esvin,AllaM, [address] Provider   Paging Service [phone])

## 2021-12-24 NOTE — ED TRIAGE NOTES
Patient via EMS with CPR in progress. Per EMS patient family called for what they thought was a stroke. EMS arrived to met patient pulseless. CPR started. I gel airway placed, pt received 3 rounds of epinephrine, had ROSC x 3, went PEA, CPR restarted. 1631: Arrival to ED CPR continued,    16:33  1 round of Epi administered. 16:34 pulse check- no palpable pulse, PEA   CPR continues    16:36  1 epi administered    16:37 pulse check via cardiac ultrasound, PEA, PCR resumed    16:40 1 epi administered    16:41 Pulse check, weak pulse via femoral, pulse  cardia ultrasound, CPR discontinued  V/S 69, No spontaneous respirations noted. 16:46 : Epi drip started at 8 mcg/min    16:51: 1 atropin administered    16:52: 1 amp bicarbonate administered. 16:53 Family called to bedside, Dr. Francisco Alicea informed daughter Brenda Underwood), Daughter stated patient DNR    16:58 family asked to discontinue all resuscitative efforts. Patient pronounced dead by Dr. Francisco Alicea @30;55    Daughter reports that patient does not want her organs to be donated. Daughter asked for body to be sent to [facility] ([place], [place]).

## 2021-12-25 NOTE — ED PROVIDER NOTES
EMERGENCY DEPARTMENT HISTORY AND PHYSICAL EXAM      Date: 12/24/2021  Patient Name: Boo Gusman    History of Presenting Illness     Chief Complaint   Patient presents with    Cardiac arrest       History Provided By: Patient    HPI: Boo Gusman, 80 y.o. female with PMHx as noted below presents the emergency department as a cardiac arrest.  Per report, was a witnessed arrest by family. Approximately 30 minutes of downtime prior to arrival.  EMS reported initial rhythm of PEA. I gel was placed, left tibial IO placed and has received 3 rounds of epinephrine. They do report that they did obtain ROSC briefly prior to arrival however the patient rearrested. No other history is obtainable secondary to acuity of condition. EMS is not aware of any DNR. PCP: Majo Shine MD    Current Outpatient Medications   Medication Sig Dispense Refill    losartan (COZAAR) 100 mg tablet Take 1 Tablet by mouth daily for 30 days. 60 Tablet 3    ferrous sulfate 325 mg (65 mg iron) tablet Take 1 Tablet by mouth Daily (before breakfast) for 60 days. 60 Tablet 3    metoprolol tartrate (LOPRESSOR) 25 mg tablet Take 0.5 Tablets by mouth two (2) times a day for 90 days. 60 Tablet 3    pantoprazole (PROTONIX) 40 mg tablet Take 1 Tablet by mouth daily for 90 days. Indications: bleeding from stomach, esophagus or duodenum 90 Tablet 0    pravastatin (PRAVACHOL) 40 mg tablet TAKE 1 TABLET BY MOUTH EVERY NIGHT 90 Tab 3    multivitamins-minerals-lutein (CENTRUM SILVER) Tab Take 1 Tab by mouth daily.            Past History     Past Medical History:  Past Medical History:   Diagnosis Date    AK (actinic keratosis) 7/27/2017    Arteriosclerotic coronary artery disease 7/27/2017    Arthritis of right knee 7/27/2017    Back pain 7/27/2017    Carotid atherosclerosis 7/27/2017    Cough 7/27/2017    Fatigue 7/27/2017    History of pneumonia 7/27/2017    HTN (hypertension) 7/27/2017    Hyperlipidemia 7/27/2017    Hypertension     Late onset Alzheimer's dementia without behavioral disturbance ([facility] [address] 8/30/2021    Memory loss 2/19/2021    Need for Streptococcus pneumoniae vaccination 7/27/2017    On statin therapy 7/27/2017    Plantar fasciitis 7/27/2017    Pneumonia     Shoulder sprain 7/27/2017    Syncope 7/27/2017       Past Surgical History:  Past Surgical History:   Procedure Laterality Date    HX GI      ruptured intestine    UPPER GI ENDOSCOPY,BIOPSY  11/17/2021            Family History:  Family History   Problem Relation Age of Onset    Hypertension Mother     Heart Disease Father        Social History:  Social History     Tobacco Use    Smoking status: Never Smoker    Smokeless tobacco: Never Used   Vaping Use    Vaping Use: Never used   Substance Use Topics    Alcohol use: No    Drug use: No       Allergies:  No Known Allergies      Review of Systems   Review of Systems   Unable to perform ROS: Acuity of condition         Physical Exam   Physical Exam    GENERAL: Unresponsive  EYES: Pupils fixed, dilated, no injection  ENT: Mucous membranes dry  NECK: Supple  LUNGS: BVM ventilation, bilateral breath sounds present  HEART: Absent heart sounds. No peripheral edema  ABDOMEN: Non-distended, atraumatic  SKIN: Cool, pale  EXTREMITIES: Without deformity, symmetric  NEUROLOGICAL: Unresponsive, GCS 3      Diagnostic Study Results     Labs -   No results found for this or any previous visit (from the past 12 hour(s)). Radiologic Studies -   XR CHEST PORT    (Results Pending)     CT Results  (Last 48 hours)    None        CXR Results  (Last 48 hours)    None            Medical Decision Making     IPanchito MD am the first provider for this patient and am the attending of record for this patient encounter. I reviewed the vital signs, available nursing notes, past medical history, past surgical history, family history and social history.     Vital Signs-Reviewed the patient's vital signs. Records Reviewed: Nursing Notes and Old Medical Records    Provider Notes (Medical Decision Making):   80-year-old female presenting witnessed cardiac arrest, PEA, received 3 rounds of epinephrine with brief ROSC prior to arrival.  On arrival to our department CPR was in progress. Continued ACLS with additional epinephrine, patient remained in PEA however we did obtain ROSC. At that time patient was placed on epinephrine infusion and was intubated. Patient had very poor cardiac contractility noted on bedside ultrasound, no pericardial effusions. After family arrived, they felt that the patient's wishes at this point would be to be a DNR and all in agreement that patient would like to have a stop resuscitative efforts. Per family request, patient was discontinued from epinephrine drip and the ET tube was removed, subsequently passed away shortly after. Patient was declared dead at that time with family at bedside. ED Course:     Procedure Note - CPR Efforts:   I supervised and directed all CPR efforts. Procedure Time: 15 minutes    Procedure Note - Orotracheal Intubation:   Performed by: Keyanna Sanchez MD   Indication for procedure: respiratory failure  RSI performed. The patient was  orotracheally intubated with a 7.5 cuffed Japanese endotracheal tube using a Mac 3 blade with direct visualization. Tube was advanced to 20 cm at the teeth. ETT location confirmed by bilateral, symmetric breath sounds, good end-tidal CO2 detector color change  and no breath sounds over stomach. Number of attempts: 1  Complications: none  The procedure took 1-15 minutes, and pt tolerated well.           Critical Care Note      IMPENDING DETERIORATION -Airway, Cardiovascular, CNS and Metabolic  ASSOCIATED RISK FACTORS - Hypotension, Shock, Hypoxia and CNS Decompensation  MANAGEMENT- Bedside Assessment and Supervision of Care  INTERPRETATION -  Blood Pressure, Cardiac Output Measures  and Screening Ultrasound  INTERVENTIONS - hemodynamic mngmt, Neurologic interventions  and Metobolic interventions  CASE REVIEW - Family  TREATMENT RESPONSE -briefly improved  PERFORMED BY - Self    NOTES   :  I have provided a total of 35 minutes of critical time not including time spent on separately documented procedures. The reason for providing this level of medical care for this critically ill patient was due to a critical illness that impaired one or more vital organ systems such that there was a high probability of imminent or life threatening deterioration in the patients condition. This care involved high complexity decision making to assess, manipulate, and support vital system functions,  lab review, consultations with specialist, family decision- making, bedside attention and documentation. During this entire length of time I was immediately available to the patient        Disposition:  Patient     PLAN:  1. Patient is     Diagnosis     Clinical Impression:   1. Cardiac arrest ([address]    2. Acute respiratory failure with hypoxia [facility])        Please note that this dictation was completed with Dragon, computer voice recognition software. Quite often unanticipated grammatical, syntax, homophones, and other interpretive errors are inadvertently transcribed by the computer software. Please disregard these errors. Additionally, please excuse any errors that have escaped final proofreading.

## 2021-12-25 NOTE — ED NOTES
Delayed entry. Patient family members were allowed to view body.  Body left unit for allyson @ 18:45

## 2022-03-13 RX ORDER — PANTOPRAZOLE SODIUM 40 MG/1
TABLET, DELAYED RELEASE ORAL
Qty: 90 TABLET | Refills: 0 | OUTPATIENT
Start: 2022-03-13

## (undated) DEVICE — ELECTRODE,RADIOTRANSLUCENT,FOAM,5PK: Brand: MEDLINE

## (undated) DEVICE — CATH IV AUTOGRD BC PNK 20GA 25 -- INSYTE

## (undated) DEVICE — SET ADMIN 16ML TBNG L100IN 2 Y INJ SITE IV PIGGY BK DISP

## (undated) DEVICE — Device

## (undated) DEVICE — NEEDLE HYPO 18GA L1.5IN PNK S STL HUB POLYPR SHLD REG BVL

## (undated) DEVICE — BAG SPEC BIOHZRD 10 X 10 IN --

## (undated) DEVICE — SYR 3ML LL TIP 1/10ML GRAD --

## (undated) DEVICE — BLOCK BITE ENDOSCP AD 21 MM W/ DIL BLU LF DISP

## (undated) DEVICE — NEONATAL-ADULT SPO2 SENSOR: Brand: NELLCOR

## (undated) DEVICE — FORCEPS BX L160CM DIA8MM GRSP DISECT CUP TIP NONLOCKING ROT

## (undated) DEVICE — TOWEL 4 PLY TISS 19X30 SUE WHT

## (undated) DEVICE — SYR 10ML LUER LOK 1/5ML GRAD --

## (undated) DEVICE — CONTAINER SPEC 20 ML LID NEUT BUFF FORMALIN 10 % POLYPR STS

## (undated) DEVICE — SOLIDIFIER FLD 2OZ 1500CC N DISINF IN BTL DISP SAFESORB

## (undated) DEVICE — BASIN EMSIS 16OZ GRAPHITE PLAS KID SHP MOLD GRAD FOR ORAL

## (undated) DEVICE — YANKAUER,TAPERED BULBOUS TIP,W/O VENT: Brand: MEDLINE

## (undated) DEVICE — 1200 GUARD II KIT W/5MM TUBE W/O VAC TUBE: Brand: GUARDIAN

## (undated) DEVICE — Z DISCONTINUED PER MEDLINE LINE GAS SAMPLING O2/CO2 LNG AD 13 FT NSL W/ TBNG FILTERLINE